# Patient Record
Sex: FEMALE | Race: WHITE | NOT HISPANIC OR LATINO | Employment: UNEMPLOYED
[De-identification: names, ages, dates, MRNs, and addresses within clinical notes are randomized per-mention and may not be internally consistent; named-entity substitution may affect disease eponyms.]

---

## 2017-02-28 ENCOUNTER — RECORDS - HEALTHEAST (OUTPATIENT)
Dept: ADMINISTRATIVE | Facility: OTHER | Age: 42
End: 2017-02-28

## 2017-02-28 ENCOUNTER — OFFICE VISIT - HEALTHEAST (OUTPATIENT)
Dept: INTERNAL MEDICINE | Facility: CLINIC | Age: 42
End: 2017-02-28

## 2017-02-28 DIAGNOSIS — R10.84 GENERALIZED ABDOMINAL PAIN: ICD-10-CM

## 2017-02-28 DIAGNOSIS — J44.1 COPD EXACERBATION (H): ICD-10-CM

## 2017-02-28 ASSESSMENT — MIFFLIN-ST. JEOR: SCORE: 1682.81

## 2017-03-21 ENCOUNTER — COMMUNICATION - HEALTHEAST (OUTPATIENT)
Dept: INTERNAL MEDICINE | Facility: CLINIC | Age: 42
End: 2017-03-21

## 2017-03-24 ENCOUNTER — RECORDS - HEALTHEAST (OUTPATIENT)
Dept: ADMINISTRATIVE | Facility: OTHER | Age: 42
End: 2017-03-24

## 2017-03-27 ENCOUNTER — RECORDS - HEALTHEAST (OUTPATIENT)
Dept: ADMINISTRATIVE | Facility: OTHER | Age: 42
End: 2017-03-27

## 2017-03-30 ENCOUNTER — HOSPITAL ENCOUNTER (OUTPATIENT)
Dept: MRI IMAGING | Facility: CLINIC | Age: 42
Discharge: HOME | End: 2017-03-30
Attending: INTERNAL MEDICINE

## 2017-03-30 DIAGNOSIS — R10.31 ABDOMINAL PAIN, RIGHT LOWER QUADRANT: ICD-10-CM

## 2017-03-31 ENCOUNTER — RECORDS - HEALTHEAST (OUTPATIENT)
Dept: ADMINISTRATIVE | Facility: OTHER | Age: 42
End: 2017-03-31

## 2017-05-03 ENCOUNTER — OFFICE VISIT - HEALTHEAST (OUTPATIENT)
Dept: INTERNAL MEDICINE | Facility: CLINIC | Age: 42
End: 2017-05-03

## 2017-05-03 DIAGNOSIS — J41.1 MUCOPURULENT CHRONIC BRONCHITIS (H): ICD-10-CM

## 2017-05-03 DIAGNOSIS — J01.00 ACUTE NON-RECURRENT MAXILLARY SINUSITIS: ICD-10-CM

## 2017-05-03 ASSESSMENT — MIFFLIN-ST. JEOR: SCORE: 1744.05

## 2017-05-23 ENCOUNTER — OFFICE VISIT - HEALTHEAST (OUTPATIENT)
Dept: INTERNAL MEDICINE | Facility: CLINIC | Age: 42
End: 2017-05-23

## 2017-05-23 DIAGNOSIS — L55.9 SUNBURN: ICD-10-CM

## 2017-05-23 DIAGNOSIS — Q82.8 DARIER DISEASE: ICD-10-CM

## 2017-05-23 ASSESSMENT — MIFFLIN-ST. JEOR: SCORE: 1748.59

## 2017-06-07 ENCOUNTER — OFFICE VISIT - HEALTHEAST (OUTPATIENT)
Dept: INTERNAL MEDICINE | Facility: CLINIC | Age: 42
End: 2017-06-07

## 2017-06-07 DIAGNOSIS — E66.01 MORBID OBESITY DUE TO EXCESS CALORIES (H): ICD-10-CM

## 2017-06-07 DIAGNOSIS — J45.30 MILD PERSISTENT ASTHMA: ICD-10-CM

## 2017-06-07 DIAGNOSIS — Q82.8 DARIER DISEASE: ICD-10-CM

## 2017-06-07 DIAGNOSIS — J30.2 SEASONAL ALLERGIES: ICD-10-CM

## 2017-06-07 ASSESSMENT — MIFFLIN-ST. JEOR: SCORE: 1753.12

## 2017-08-01 ENCOUNTER — OFFICE VISIT - HEALTHEAST (OUTPATIENT)
Dept: INTERNAL MEDICINE | Facility: CLINIC | Age: 42
End: 2017-08-01

## 2017-08-01 ENCOUNTER — HOSPITAL ENCOUNTER (OUTPATIENT)
Dept: CT IMAGING | Facility: CLINIC | Age: 42
Discharge: HOME | End: 2017-08-01
Attending: INTERNAL MEDICINE

## 2017-08-01 DIAGNOSIS — R04.2 HEMOPTYSIS: ICD-10-CM

## 2017-08-01 DIAGNOSIS — R05.9 COUGH: ICD-10-CM

## 2017-08-01 ASSESSMENT — MIFFLIN-ST. JEOR: SCORE: 1730.44

## 2017-08-22 ENCOUNTER — OFFICE VISIT - HEALTHEAST (OUTPATIENT)
Dept: INTERNAL MEDICINE | Facility: CLINIC | Age: 42
End: 2017-08-22

## 2017-08-22 DIAGNOSIS — E66.01 MORBID OBESITY DUE TO EXCESS CALORIES (H): ICD-10-CM

## 2017-08-22 DIAGNOSIS — R10.84 GENERALIZED ABDOMINAL PAIN: ICD-10-CM

## 2017-08-22 DIAGNOSIS — I10 ESSENTIAL HYPERTENSION WITH GOAL BLOOD PRESSURE LESS THAN 140/90: ICD-10-CM

## 2017-08-22 DIAGNOSIS — R05.9 COUGH: ICD-10-CM

## 2017-08-22 ASSESSMENT — MIFFLIN-ST. JEOR: SCORE: 1712.3

## 2017-08-23 ENCOUNTER — RECORDS - HEALTHEAST (OUTPATIENT)
Dept: ADMINISTRATIVE | Facility: OTHER | Age: 42
End: 2017-08-23

## 2017-09-01 ENCOUNTER — AMBULATORY - HEALTHEAST (OUTPATIENT)
Dept: PULMONOLOGY | Facility: OTHER | Age: 42
End: 2017-09-01

## 2017-09-01 DIAGNOSIS — R05.9 COUGH: ICD-10-CM

## 2017-09-07 ENCOUNTER — OFFICE VISIT - HEALTHEAST (OUTPATIENT)
Dept: INTERNAL MEDICINE | Facility: CLINIC | Age: 42
End: 2017-09-07

## 2017-09-07 DIAGNOSIS — J45.30 MILD PERSISTENT ASTHMA WITHOUT COMPLICATION: ICD-10-CM

## 2017-09-07 DIAGNOSIS — Z01.818 PREOPERATIVE EXAMINATION: ICD-10-CM

## 2017-09-07 DIAGNOSIS — F31.9 BIPOLAR 1 DISORDER (H): ICD-10-CM

## 2017-09-07 DIAGNOSIS — I10 ESSENTIAL HYPERTENSION WITH GOAL BLOOD PRESSURE LESS THAN 140/90: ICD-10-CM

## 2017-09-07 DIAGNOSIS — R10.13 EPIGASTRIC PAIN: ICD-10-CM

## 2017-09-07 DIAGNOSIS — L03.818 CELLULITIS OF OTHER SPECIFIED SITE: ICD-10-CM

## 2017-09-07 ASSESSMENT — MIFFLIN-ST. JEOR: SCORE: 1673.74

## 2017-09-13 ENCOUNTER — COMMUNICATION - HEALTHEAST (OUTPATIENT)
Dept: INTERNAL MEDICINE | Facility: CLINIC | Age: 42
End: 2017-09-13

## 2017-09-21 ENCOUNTER — OFFICE VISIT - HEALTHEAST (OUTPATIENT)
Dept: INTERNAL MEDICINE | Facility: CLINIC | Age: 42
End: 2017-09-21

## 2017-09-21 DIAGNOSIS — B96.81 HELICOBACTER PYLORI GASTRITIS: ICD-10-CM

## 2017-09-21 DIAGNOSIS — K29.70 HELICOBACTER PYLORI GASTRITIS: ICD-10-CM

## 2017-09-21 DIAGNOSIS — R05.9 COUGH: ICD-10-CM

## 2017-09-21 ASSESSMENT — MIFFLIN-ST. JEOR: SCORE: 1664.67

## 2017-10-03 ENCOUNTER — OFFICE VISIT - HEALTHEAST (OUTPATIENT)
Dept: PULMONOLOGY | Facility: OTHER | Age: 42
End: 2017-10-03

## 2017-10-03 ENCOUNTER — RECORDS - HEALTHEAST (OUTPATIENT)
Dept: PULMONOLOGY | Facility: OTHER | Age: 42
End: 2017-10-03

## 2017-10-03 ENCOUNTER — RECORDS - HEALTHEAST (OUTPATIENT)
Dept: ADMINISTRATIVE | Facility: OTHER | Age: 42
End: 2017-10-03

## 2017-10-03 DIAGNOSIS — R05.9 COUGH: ICD-10-CM

## 2017-10-03 DIAGNOSIS — J32.9 SINUSITIS: ICD-10-CM

## 2017-10-03 ASSESSMENT — MIFFLIN-ST. JEOR: SCORE: 1666.94

## 2017-10-04 ENCOUNTER — COMMUNICATION - HEALTHEAST (OUTPATIENT)
Dept: INTERNAL MEDICINE | Facility: CLINIC | Age: 42
End: 2017-10-04

## 2017-10-13 ENCOUNTER — RECORDS - HEALTHEAST (OUTPATIENT)
Dept: ADMINISTRATIVE | Facility: OTHER | Age: 42
End: 2017-10-13

## 2017-11-10 ENCOUNTER — OFFICE VISIT - HEALTHEAST (OUTPATIENT)
Dept: INTERNAL MEDICINE | Facility: CLINIC | Age: 42
End: 2017-11-10

## 2017-11-10 ENCOUNTER — RECORDS - HEALTHEAST (OUTPATIENT)
Dept: GENERAL RADIOLOGY | Facility: CLINIC | Age: 42
End: 2017-11-10

## 2017-11-10 DIAGNOSIS — M79.651 RIGHT THIGH PAIN: ICD-10-CM

## 2017-11-10 DIAGNOSIS — H69.93 EUSTACHIAN TUBE DYSFUNCTION, BILATERAL: ICD-10-CM

## 2017-11-10 DIAGNOSIS — M79.651 PAIN IN RIGHT THIGH: ICD-10-CM

## 2017-11-10 DIAGNOSIS — I10 ESSENTIAL HYPERTENSION: ICD-10-CM

## 2017-11-10 DIAGNOSIS — E66.01 MORBID OBESITY (H): ICD-10-CM

## 2017-11-10 ASSESSMENT — MIFFLIN-ST. JEOR: SCORE: 1655.6

## 2017-12-08 ENCOUNTER — COMMUNICATION - HEALTHEAST (OUTPATIENT)
Dept: INTERNAL MEDICINE | Facility: CLINIC | Age: 42
End: 2017-12-08

## 2017-12-08 ENCOUNTER — OFFICE VISIT - HEALTHEAST (OUTPATIENT)
Dept: INTERNAL MEDICINE | Facility: CLINIC | Age: 42
End: 2017-12-08

## 2017-12-08 DIAGNOSIS — I10 ESSENTIAL HYPERTENSION: ICD-10-CM

## 2017-12-08 DIAGNOSIS — R05.9 COUGH: ICD-10-CM

## 2017-12-08 DIAGNOSIS — E66.01 MORBID OBESITY (H): ICD-10-CM

## 2017-12-08 DIAGNOSIS — A04.8 H. PYLORI INFECTION: ICD-10-CM

## 2017-12-08 ASSESSMENT — MIFFLIN-ST. JEOR: SCORE: 1669.2

## 2017-12-29 ENCOUNTER — COMMUNICATION - HEALTHEAST (OUTPATIENT)
Dept: INTERNAL MEDICINE | Facility: CLINIC | Age: 42
End: 2017-12-29

## 2020-05-07 ENCOUNTER — APPOINTMENT (OUTPATIENT)
Dept: CT IMAGING | Facility: CLINIC | Age: 45
End: 2020-05-07
Attending: PHYSICIAN ASSISTANT

## 2020-05-07 ENCOUNTER — HOSPITAL ENCOUNTER (EMERGENCY)
Facility: CLINIC | Age: 45
Discharge: HOME | End: 2020-05-07
Attending: PHYSICIAN ASSISTANT | Admitting: PHYSICIAN ASSISTANT

## 2020-05-07 VITALS
WEIGHT: 220 LBS | DIASTOLIC BLOOD PRESSURE: 57 MMHG | RESPIRATION RATE: 18 BRPM | HEART RATE: 52 BPM | OXYGEN SATURATION: 100 % | TEMPERATURE: 98 F | SYSTOLIC BLOOD PRESSURE: 139 MMHG

## 2020-05-07 DIAGNOSIS — R19.7 DIARRHEA: ICD-10-CM

## 2020-05-07 DIAGNOSIS — R10.31 RLQ ABDOMINAL PAIN: ICD-10-CM

## 2020-05-07 DIAGNOSIS — R11.2 NAUSEA AND VOMITING: ICD-10-CM

## 2020-05-07 LAB
ALBUMIN SERPL-MCNC: 4.1 G/DL (ref 3.4–5)
ALBUMIN UR-MCNC: NEGATIVE MG/DL
ALP SERPL-CCNC: 86 U/L (ref 40–150)
ALT SERPL W P-5'-P-CCNC: 52 U/L (ref 0–50)
ANION GAP SERPL CALCULATED.3IONS-SCNC: 7 MMOL/L (ref 3–14)
APPEARANCE UR: CLEAR
AST SERPL W P-5'-P-CCNC: 28 U/L (ref 0–45)
BACTERIA #/AREA URNS HPF: ABNORMAL /HPF
BASOPHILS # BLD AUTO: 0 10E9/L (ref 0–0.2)
BASOPHILS NFR BLD AUTO: 0.3 %
BILIRUB SERPL-MCNC: 0.6 MG/DL (ref 0.2–1.3)
BILIRUB UR QL STRIP: NEGATIVE
BUN SERPL-MCNC: 8 MG/DL (ref 7–30)
CALCIUM SERPL-MCNC: 9.1 MG/DL (ref 8.5–10.1)
CHLORIDE SERPL-SCNC: 105 MMOL/L (ref 94–109)
CO2 SERPL-SCNC: 26 MMOL/L (ref 20–32)
COLOR UR AUTO: ABNORMAL
CREAT SERPL-MCNC: 0.67 MG/DL (ref 0.52–1.04)
DIFFERENTIAL METHOD BLD: NORMAL
EOSINOPHIL # BLD AUTO: 0 10E9/L (ref 0–0.7)
EOSINOPHIL NFR BLD AUTO: 0.7 %
ERYTHROCYTE [DISTWIDTH] IN BLOOD BY AUTOMATED COUNT: 13.2 % (ref 10–15)
GFR SERPL CREATININE-BSD FRML MDRD: >90 ML/MIN/{1.73_M2}
GLUCOSE SERPL-MCNC: 97 MG/DL (ref 70–99)
GLUCOSE UR STRIP-MCNC: NEGATIVE MG/DL
HCT VFR BLD AUTO: 42.6 % (ref 35–47)
HGB BLD-MCNC: 13.5 G/DL (ref 11.7–15.7)
HGB UR QL STRIP: NEGATIVE
IMM GRANULOCYTES # BLD: 0 10E9/L (ref 0–0.4)
IMM GRANULOCYTES NFR BLD: 0.3 %
KETONES UR STRIP-MCNC: NEGATIVE MG/DL
LEUKOCYTE ESTERASE UR QL STRIP: NEGATIVE
LIPASE SERPL-CCNC: 55 U/L (ref 73–393)
LYMPHOCYTES # BLD AUTO: 2.4 10E9/L (ref 0.8–5.3)
LYMPHOCYTES NFR BLD AUTO: 41.7 %
MCH RBC QN AUTO: 30.1 PG (ref 26.5–33)
MCHC RBC AUTO-ENTMCNC: 31.7 G/DL (ref 31.5–36.5)
MCV RBC AUTO: 95 FL (ref 78–100)
MONOCYTES # BLD AUTO: 0.4 10E9/L (ref 0–1.3)
MONOCYTES NFR BLD AUTO: 6.6 %
MUCOUS THREADS #/AREA URNS LPF: PRESENT /LPF
NEUTROPHILS # BLD AUTO: 2.9 10E9/L (ref 1.6–8.3)
NEUTROPHILS NFR BLD AUTO: 50.4 %
NITRATE UR QL: NEGATIVE
NRBC # BLD AUTO: 0 10*3/UL
NRBC BLD AUTO-RTO: 0 /100
PH UR STRIP: 6 PH (ref 5–7)
PLATELET # BLD AUTO: 344 10E9/L (ref 150–450)
POTASSIUM SERPL-SCNC: 3.8 MMOL/L (ref 3.4–5.3)
PROT SERPL-MCNC: 8.1 G/DL (ref 6.8–8.8)
RBC # BLD AUTO: 4.48 10E12/L (ref 3.8–5.2)
RBC #/AREA URNS AUTO: 1 /HPF (ref 0–2)
SODIUM SERPL-SCNC: 138 MMOL/L (ref 133–144)
SOURCE: ABNORMAL
SP GR UR STRIP: 1.02 (ref 1–1.03)
SQUAMOUS #/AREA URNS AUTO: 1 /HPF (ref 0–1)
UROBILINOGEN UR STRIP-MCNC: NORMAL MG/DL (ref 0–2)
WBC # BLD AUTO: 5.8 10E9/L (ref 4–11)
WBC #/AREA URNS AUTO: <1 /HPF (ref 0–5)

## 2020-05-07 PROCEDURE — 25800030 ZZH RX IP 258 OP 636: Performed by: PHYSICIAN ASSISTANT

## 2020-05-07 PROCEDURE — 74177 CT ABD & PELVIS W/CONTRAST: CPT

## 2020-05-07 PROCEDURE — 25000125 ZZHC RX 250: Performed by: PHYSICIAN ASSISTANT

## 2020-05-07 PROCEDURE — 96361 HYDRATE IV INFUSION ADD-ON: CPT

## 2020-05-07 PROCEDURE — 25000128 H RX IP 250 OP 636: Performed by: PHYSICIAN ASSISTANT

## 2020-05-07 PROCEDURE — 80053 COMPREHEN METABOLIC PANEL: CPT | Performed by: PHYSICIAN ASSISTANT

## 2020-05-07 PROCEDURE — 96375 TX/PRO/DX INJ NEW DRUG ADDON: CPT

## 2020-05-07 PROCEDURE — 85025 COMPLETE CBC W/AUTO DIFF WBC: CPT | Performed by: PHYSICIAN ASSISTANT

## 2020-05-07 PROCEDURE — 99285 EMERGENCY DEPT VISIT HI MDM: CPT | Mod: 25

## 2020-05-07 PROCEDURE — 83690 ASSAY OF LIPASE: CPT | Performed by: PHYSICIAN ASSISTANT

## 2020-05-07 PROCEDURE — 81001 URINALYSIS AUTO W/SCOPE: CPT | Performed by: PHYSICIAN ASSISTANT

## 2020-05-07 PROCEDURE — 96374 THER/PROPH/DIAG INJ IV PUSH: CPT | Mod: 59

## 2020-05-07 PROCEDURE — 96376 TX/PRO/DX INJ SAME DRUG ADON: CPT

## 2020-05-07 RX ORDER — ONDANSETRON 4 MG/1
4 TABLET, ORALLY DISINTEGRATING ORAL EVERY 8 HOURS PRN
Qty: 9 TABLET | Refills: 0 | Status: SHIPPED | OUTPATIENT
Start: 2020-05-07 | End: 2020-05-10

## 2020-05-07 RX ORDER — DICYCLOMINE HCL 20 MG
20 TABLET ORAL 2 TIMES DAILY
Qty: 20 TABLET | Refills: 0 | Status: SHIPPED | OUTPATIENT
Start: 2020-05-07 | End: 2020-05-17

## 2020-05-07 RX ORDER — HYDROMORPHONE HYDROCHLORIDE 1 MG/ML
0.5 INJECTION, SOLUTION INTRAMUSCULAR; INTRAVENOUS; SUBCUTANEOUS
Status: DISCONTINUED | OUTPATIENT
Start: 2020-05-07 | End: 2020-05-07 | Stop reason: HOSPADM

## 2020-05-07 RX ORDER — OXYCODONE HYDROCHLORIDE 5 MG/1
5 TABLET ORAL EVERY 6 HOURS PRN
Qty: 12 TABLET | Refills: 0 | Status: SHIPPED | OUTPATIENT
Start: 2020-05-07 | End: 2021-01-05

## 2020-05-07 RX ORDER — ONDANSETRON 2 MG/ML
4 INJECTION INTRAMUSCULAR; INTRAVENOUS EVERY 30 MIN PRN
Status: DISCONTINUED | OUTPATIENT
Start: 2020-05-07 | End: 2020-05-07 | Stop reason: HOSPADM

## 2020-05-07 RX ORDER — SODIUM CHLORIDE 9 MG/ML
1000 INJECTION, SOLUTION INTRAVENOUS CONTINUOUS
Status: DISCONTINUED | OUTPATIENT
Start: 2020-05-07 | End: 2020-05-07 | Stop reason: HOSPADM

## 2020-05-07 RX ORDER — IOPAMIDOL 755 MG/ML
500 INJECTION, SOLUTION INTRAVASCULAR ONCE
Status: COMPLETED | OUTPATIENT
Start: 2020-05-07 | End: 2020-05-07

## 2020-05-07 RX ADMIN — IOPAMIDOL 100 ML: 755 INJECTION, SOLUTION INTRAVENOUS at 20:21

## 2020-05-07 RX ADMIN — HYDROMORPHONE HYDROCHLORIDE 0.5 MG: 1 INJECTION, SOLUTION INTRAMUSCULAR; INTRAVENOUS; SUBCUTANEOUS at 19:35

## 2020-05-07 RX ADMIN — ONDANSETRON 4 MG: 2 INJECTION INTRAMUSCULAR; INTRAVENOUS at 19:35

## 2020-05-07 RX ADMIN — HYDROMORPHONE HYDROCHLORIDE 0.5 MG: 1 INJECTION, SOLUTION INTRAMUSCULAR; INTRAVENOUS; SUBCUTANEOUS at 20:57

## 2020-05-07 RX ADMIN — SODIUM CHLORIDE 65 ML: 9 INJECTION, SOLUTION INTRAVENOUS at 20:21

## 2020-05-07 RX ADMIN — SODIUM CHLORIDE 1000 ML: 9 INJECTION, SOLUTION INTRAVENOUS at 19:35

## 2020-05-07 ASSESSMENT — ENCOUNTER SYMPTOMS
VOMITING: 1
FEVER: 0
DIARRHEA: 1
FLANK PAIN: 0
ABDOMINAL PAIN: 1
NAUSEA: 1
DYSURIA: 0

## 2020-05-07 NOTE — ED AVS SNAPSHOT
[facility] Emergency Department  [address]  [facility] [address]  Phone:  [phone]  Fax:  [phone]                                    Adriane Garrett   MRN: [id number]    Department:  [facility] Emergency Department   Date of Visit:  5/7/2020           After Visit Summary Signature Page    I have received my discharge instructions, and my questions have been answered. I have discussed any challenges I see with this plan with the nurse or doctor.    ..........................................................................................................................................  Patient/Patient Representative Signature      ..........................................................................................................................................  Patient Representative Print Name and Relationship to Patient    ..................................................               ................................................  Date                                   Time    ..........................................................................................................................................  Reviewed by Signature/Title    ...................................................              ..............................................  Date                                               Time          22EPIC Rev 08/18

## 2020-05-08 NOTE — ED PROVIDER NOTES
History     Chief Complaint:  Abdominal Pain; Nausea & Vomiting; and Diarrhea       HPI   Adriane Garrett is a 44 year old female who presents with diarrhea for the last week.  She states that she has multiple episodes of loose stools a day, every time she sits to urinate she has loose stool.  She denies any dysuria, hematuria.  No vaginal complaints.  For the past 4 days she has began to develop right lower quadrant pain.  She has had a history of hysterectomy and tubal ligation.  No one else around her has been sick.  She denies any cough, chest pain, shortness of breath, fever.  No abnormal food consumption or travel.  She denies a history of C. difficile.  No recent antibiotics.  She does have history of IBS but does not feel that her symptoms are currently consistent with this.  No history of inflammatory bowel disease.  No bloody stools.  She has had a few episodes of vomiting.    Allergies:  No known drug allergies    Medications:    The patient is not currently taking any prescribed medications.    Past Medical History:    IBS    Past Surgical History:   Cholecystectomy   Hysterectomy   Tubal ligation       Family History:    History reviewed. No pertinent family history.     Social History:  Smoking status: never smoker but uses smokeless tobacco  Alcohol use: yes current     Review of Systems   Constitutional: Negative for fever.   Gastrointestinal: Positive for abdominal pain, diarrhea, nausea and vomiting.   Genitourinary: Negative for dysuria, flank pain and urgency.   All other systems reviewed and are negative.    Physical Exam     Patient Vitals for the past 24 hrs:   BP Temp Temp src Pulse Heart Rate Resp SpO2 Weight   05/07/20 2100 139/57 -- -- -- -- -- 100 % --   05/07/20 2000 114/47 -- -- (!) 45 -- -- -- --   05/07/20 1955 -- -- -- -- -- -- -- 99.8 kg (220 lb)   05/07/20 1915 (!) 135/91 -- -- -- -- -- -- --   05/07/20 1911 -- 98  F (36.7  C) Oral 52 52 18 99 % --        Physical Exam  General:  Well appearing, well nourished. Normal mood and affect.  Skin: Good turgor, no rash, no unusual bruising or prominent lesions.  HEENT: Head: Normocephalic, atraumatic, no visible masses.   Eyes: Conjunctiva clear.  Cardiac: Normal rate and regular rhythm, no murmur or gallop.   Lungs: Clear to auscultation.  Abdomen: Right lower quadrant pain.  Mild guarding.  No pain at McBurney's.  No flank tenderness bilaterally.  Musculoskeletal: Normal gait and station.   Neurologic: Oriented x 3. GCS: 15.  Psychiatric: Intact recent and remote memory, judgment and insight, normal mood and affect.     Emergency Department Course     Imaging:  Radiographic findings were communicated with the patient who voiced understanding of the findings.    CT Abdomen Pelvis w contrast   IMPRESSION:   1.  No acute abnormality in the abdomen or pelvis  Reading per radiology     Laboratory:  CBC:  o/w WNL. (WBC 5.8, HGB 13.5, )   CMP: Glucose 97, o/w WNL (Creatinine: 0.67)    Lipase: 55 (L)    UA: bacteria few, mucus present, o/w Negative    Interventions:  1935 NS 1L IV Bolus  1935 Zofran 4mg IV injection   1935 Dilaudid 0.5mg IV injection  2057 Dilaudid 0.5mg IV injection     Emergency Department Course:  Past medical records, nursing notes, and vitals reviewed.  I performed an exam of the patient and obtained history, as documented above.    IV was inserted and blood was drawn for laboratory testing, results above.  The patient was sent for a CT while in the emergency department, findings above.  The patient provided a urine sample here in the emergency department. This was sent for laboratory testing, findings above.    2121 I rechecked the patient. Findings and plan explained to the Patient. Patient was feeling improved.    I personally reviewed the laboratory results with the Patient and answered all related questions prior to discharge.    I rechecked the patient.  Findings and plan explained to the Patient. Patient discharged  home with instructions regarding supportive care, medications, and reasons to return. The importance of close follow-up was reviewed.     Impression & Plan      Medical Decision Making:  Adriane Garrett is a 44 year old female who presents for abdominal pain.  Details of the patient's history can be noted in the HPI.  Differentials considered include appendicitis, diverticulitis, gallbladder pathology, gastritis, obstruction, C. difficile infection, amongst others.  On my exam, patient had right lower quadrant pain.  Basic labs obtained, showing no electrolyte disturbances.  No leukocytosis.  Urine negative for infection.  She has had a hysterectomy and tubal ligation, pregnancy not needed.  CT obtained, showing no signs of pathology within the abdomen.  After IV fluids and pain medications as given above as well as antiemetics, patient had significant improvement in her symptoms.  With reassuring work-up here, I do not feel that she warrants additional testing at this time.  We will discharge her with Bentyl to help with abdominal cramping, Zofran for vomiting, and oxycodone.  She also given a stool kit and if she is able to provide a sample she will return this as she has been having loose stools for numerous days.  Close follow-up with primary if not improving.  Return for changing worsening abdominal pain, increasing weakness, high fevers, new concerns.  All questions were answered prior to discharge.  She is in agreement with the treatment plan as stated above.      Diagnosis:    ICD-10-CM    1. RLQ abdominal pain  R10.31    2. Diarrhea  R19.7 Enteric Bacteria and Virus Panel by SUSAN Stool     Clostridium difficile toxin B   3. Nausea and vomiting  R11.2         Discharge Medications:  New Prescriptions    DICYCLOMINE (BENTYL) 20 MG TABLET    Take 1 tablet (20 mg) by mouth 2 times daily for 10 days    ONDANSETRON (ZOFRAN ODT) 4 MG ODT TAB    Take 1 tablet (4 mg) by mouth every 8 hours as needed for nausea or  vomiting    OXYCODONE (ROXICODONE) 5 MG TABLET    Take 1 tablet (5 mg) by mouth every 6 hours as needed for pain        5/7/2020   Emili Horowitz PA     Scribe Disclosure:  I, Rhina Purdy, am serving as a scribe at 7:46 PM on 5/7/2020 to document services personally performed by Emili Horowitz PA based on my observations and the provider's statements to me.     This was created at least in part with a voice recognition software. Mistakes/typos may be present.      Emili Horowitz PA  05/07/20 9890

## 2020-05-08 NOTE — ED TRIAGE NOTES
Pt started having diarrhea x1 week. On Sunday night, pt started having RLQ abd pain. Pain has been getting worse. Some nausea and vomiting, but mainly pain and diarrhea. Had 10x diarrhea today. Unknown fever status. ABCs intact. A&OX3.

## 2020-05-08 NOTE — DISCHARGE INSTRUCTIONS
Your blood work and scan look reassuring here today.  Suspect that a viral illness is causing your abdominal cramping and loose stools.  If you are able to provide a stool test, you can bring back the sample as instructed at discharge.  The Zofran at home can help with nausea.  The Bentyl will help with abdominal cramping.  The oxycodone should help with pain.  You can take Tylenol and ibuprofen with oxycodone as well.  Try and stay well-hydrated at home.  Eat bland foods, avoid spicy foods.  Return for changing worsening symptoms, worsening abdominal pain, persistent bloody stools, new concerns.  Call primary if not improving in the next few days.     Discharge Instructions  Gastroenteritis    You have been seen today for vomiting and diarrhea, called gastroenteritis or the stomach flu. This is usually caused by a virus, but some bacteria, parasites, medicines or other medical conditions can cause similar symptoms. At this time your doctor does not find that your vomiting and diarrhea is a sign of anything dangerous or life-threatening.  However, sometimes the signs of serious illness do not show up right away.  If you have new or worse symptoms, you may need to be seen again in the Emergency Department or by your primary doctor. Remember that serious problems like appendicitis can look like gastroenteritis at first.       Return to the Emergency Department if:  You keep throwing up and you are not able to keep liquids down.  You feel you are getting dehydrated, such as being very thirsty, not urinating at least every 8-12 hours, or feeling faint or lightheaded.   You develop a new fever, or your fever continues for more than 2 days.  You have belly pain that seems worse than cramps, is in one spot, or is getting worse over time.   You have blood in your vomit or in your diarrhea.  You feel very weak.  You are not starting to improve within 24 hours of your visit here.    What can I do to help myself?  The most  important thing to do is to drink clear liquids.  If you have been vomiting a lot, it is best to have only small, frequent sips of liquids.  Drinking too much at once may cause more vomiting. If you are vomiting often, you must replace minerals, sodium and potassium lost with your illness. Pedialyte  and sports drinks can help you replace these minerals.  You can also drink clear liquids such as water, weak tea, apple juice, and 7-Up . Avoid acid liquids (orange), caffeine (coffee) or alcohol. Do not drink milk until you no longer have diarrhea.  After liquids are staying down, you may start eating mild foods. Soda crackers, toast, plain noodles, gelatin, applesauce and bananas are good first choices.  Avoid foods that have acid, are spicy, fatty or fibrous (such as meats, coarse grains, vegetables). You may start eating these foods again in about 3 days when you are better.  Sometimes treatment includes prescription medicine to prevent nausea and vomiting and to prevent diarrhea. If your doctor prescribes these for you, take them as directed.  Nonprescription medicine is available for the treatment of diarrhea and can be very effective.  If you use it, make sure you use the dose recommended on the package. Avoid Lomotil . Check with your healthcare provider before you use any medicine for diarrhea.  Don t take ibuprofen, or other nonsteroidal anti-inflammatory medicines without checking with your healthcare provider.  If you were given a prescription for medicine here today, be sure to read all of the information (including the package insert) that comes with your prescription.  This will include important information about the medicine, its side effects, and any warnings that you need to know about.  The pharmacist who fills the prescription can provide more information and answer questions you may have about the medicine.  If you have questions or concerns that the pharmacist cannot address, please call or return  to the Emergency Department.   Opioid Medication Information    Pain medications are among the most commonly prescribed medicines, so we are including this information for all our patients. If you did not receive pain medication or get a prescription for pain medicine, you can ignore it.     You may have been given a prescription for an opioid (narcotic) pain medicine and/or have received a pain medicine while here in the Emergency Department. These medicines can make you drowsy or impaired. You must not drive, operate dangerous equipment, or engage in any other dangerous activities while taking these medications. If you drive while taking these medications, you could be arrested for DUI, or driving under the influence. Do not drink any alcohol while you are taking these medications.     Opioid pain medications can cause addiction. If you have a history of chemical dependency of any type, you are at a higher risk of becoming addicted to pain medications.  Only take these prescribed medications to treat your pain when all other options have been tried. Take it for as short a time and as few doses as possible. Store your pain pills in a secure place, as they are frequently stolen and provide a dangerous opportunity for children or visitors in your house to start abusing these powerful medications. We will not replace any lost or stolen medicine.  As soon as your pain is better, you should flush all your remaining medication.     Many prescription pain medications contain Tylenol  (acetaminophen), including Vicodin , Tylenol #3 , Norco , Lortab , and Percocet .  You should not take any extra pills of Tylenol  if you are using these prescription medications or you can get very sick.  Do not ever take more than 3000 mg of acetaminophen in any 24 hour period.    All opioids tend to cause constipation. Drink plenty of water and eat foods that have a lot of fiber, such as fruits, vegetables, prune juice, apple juice and  high fiber cereal.  Take a laxative if you don t move your bowels at least every other day. Miralax , Milk of Magnesia, Colace , or Senna  can be used to keep you regular.      Remember that you can always come back to the Emergency Department if you are not able to see your regular doctor in the amount of time listed above, if you get any new symptoms, or if there is anything that worries you.

## 2020-07-14 ENCOUNTER — HOSPITAL ENCOUNTER (EMERGENCY)
Facility: CLINIC | Age: 45
Discharge: HOME | End: 2020-07-15
Attending: EMERGENCY MEDICINE | Admitting: EMERGENCY MEDICINE

## 2020-07-14 DIAGNOSIS — R10.11 RUQ ABDOMINAL PAIN: ICD-10-CM

## 2020-07-14 PROCEDURE — 99285 EMERGENCY DEPT VISIT HI MDM: CPT | Mod: 25

## 2020-07-14 PROCEDURE — 80053 COMPREHEN METABOLIC PANEL: CPT | Performed by: EMERGENCY MEDICINE

## 2020-07-14 PROCEDURE — 96374 THER/PROPH/DIAG INJ IV PUSH: CPT

## 2020-07-14 PROCEDURE — 96375 TX/PRO/DX INJ NEW DRUG ADDON: CPT

## 2020-07-14 PROCEDURE — 85025 COMPLETE CBC W/AUTO DIFF WBC: CPT | Performed by: EMERGENCY MEDICINE

## 2020-07-14 PROCEDURE — 83690 ASSAY OF LIPASE: CPT | Performed by: EMERGENCY MEDICINE

## 2020-07-14 RX ORDER — ONDANSETRON 2 MG/ML
4 INJECTION INTRAMUSCULAR; INTRAVENOUS
Status: DISCONTINUED | OUTPATIENT
Start: 2020-07-14 | End: 2020-07-15 | Stop reason: HOSPADM

## 2020-07-14 ASSESSMENT — ENCOUNTER SYMPTOMS
FREQUENCY: 0
ROS GI COMMENTS: NO MELENA
DIARRHEA: 1
NAUSEA: 1
ABDOMINAL PAIN: 1
BLOOD IN STOOL: 0
FEVER: 0
CONSTIPATION: 0
DYSURIA: 0
VOMITING: 1
SHORTNESS OF BREATH: 0
HEMATURIA: 0

## 2020-07-14 NOTE — ED AVS SNAPSHOT
[facility] Emergency Department  [address]  [facility] [address]  Phone:  [phone]  Fax:  [phone]                                    Adriane Garrett   MRN: [id number]    Department:  [facility] Emergency Department   Date of Visit:  7/14/2020           After Visit Summary Signature Page    I have received my discharge instructions, and my questions have been answered. I have discussed any challenges I see with this plan with the nurse or doctor.    ..........................................................................................................................................  Patient/Patient Representative Signature      ..........................................................................................................................................  Patient Representative Print Name and Relationship to Patient    ..................................................               ................................................  Date                                   Time    ..........................................................................................................................................  Reviewed by Signature/Title    ...................................................              ..............................................  Date                                               Time          22EPIC Rev 08/18

## 2020-07-15 VITALS
TEMPERATURE: 98.3 F | RESPIRATION RATE: 20 BRPM | SYSTOLIC BLOOD PRESSURE: 141 MMHG | OXYGEN SATURATION: 96 % | HEART RATE: 51 BPM | DIASTOLIC BLOOD PRESSURE: 69 MMHG

## 2020-07-15 LAB
ALBUMIN SERPL-MCNC: 3.5 G/DL (ref 3.4–5)
ALP SERPL-CCNC: 95 U/L (ref 40–150)
ALT SERPL W P-5'-P-CCNC: 32 U/L (ref 0–50)
ANION GAP SERPL CALCULATED.3IONS-SCNC: 4 MMOL/L (ref 3–14)
AST SERPL W P-5'-P-CCNC: 15 U/L (ref 0–45)
BILIRUB SERPL-MCNC: 0.2 MG/DL (ref 0.2–1.3)
BUN SERPL-MCNC: 14 MG/DL (ref 7–30)
CALCIUM SERPL-MCNC: 8.8 MG/DL (ref 8.5–10.1)
CHLORIDE SERPL-SCNC: 106 MMOL/L (ref 94–109)
CO2 SERPL-SCNC: 29 MMOL/L (ref 20–32)
CREAT SERPL-MCNC: 0.63 MG/DL (ref 0.52–1.04)
DIFFERENTIAL METHOD BLD: NORMAL
EOSINOPHIL # BLD AUTO: 0.1 10E9/L (ref 0–0.7)
EOSINOPHIL NFR BLD AUTO: 2 %
ERYTHROCYTE [DISTWIDTH] IN BLOOD BY AUTOMATED COUNT: 13 % (ref 10–15)
GFR SERPL CREATININE-BSD FRML MDRD: >90 ML/MIN/{1.73_M2}
GLUCOSE SERPL-MCNC: 107 MG/DL (ref 70–99)
HCT VFR BLD AUTO: 39.1 % (ref 35–47)
HGB BLD-MCNC: 12.5 G/DL (ref 11.7–15.7)
LIPASE SERPL-CCNC: 78 U/L (ref 73–393)
LYMPHOCYTES # BLD AUTO: 3 10E9/L (ref 0.8–5.3)
LYMPHOCYTES NFR BLD AUTO: 50 %
MCH RBC QN AUTO: 29.8 PG (ref 26.5–33)
MCHC RBC AUTO-ENTMCNC: 32 G/DL (ref 31.5–36.5)
MCV RBC AUTO: 93 FL (ref 78–100)
MONOCYTES # BLD AUTO: 0.4 10E9/L (ref 0–1.3)
MONOCYTES NFR BLD AUTO: 6 %
NEUTROPHILS # BLD AUTO: 2.5 10E9/L (ref 1.6–8.3)
NEUTROPHILS NFR BLD AUTO: 42 %
PLATELET # BLD AUTO: 335 10E9/L (ref 150–450)
POTASSIUM SERPL-SCNC: 3.6 MMOL/L (ref 3.4–5.3)
PROT SERPL-MCNC: 7.2 G/DL (ref 6.8–8.8)
RBC # BLD AUTO: 4.19 10E12/L (ref 3.8–5.2)
SODIUM SERPL-SCNC: 139 MMOL/L (ref 133–144)
WBC # BLD AUTO: 6 10E9/L (ref 4–11)

## 2020-07-15 PROCEDURE — 25000132 ZZH RX MED GY IP 250 OP 250 PS 637: Performed by: EMERGENCY MEDICINE

## 2020-07-15 PROCEDURE — 25000125 ZZHC RX 250: Performed by: EMERGENCY MEDICINE

## 2020-07-15 PROCEDURE — 25000128 H RX IP 250 OP 636: Performed by: EMERGENCY MEDICINE

## 2020-07-15 RX ORDER — OXYCODONE HYDROCHLORIDE 5 MG/1
5 TABLET ORAL EVERY 6 HOURS PRN
Qty: 5 TABLET | Refills: 0 | Status: SHIPPED | OUTPATIENT
Start: 2020-07-15 | End: 2021-01-05

## 2020-07-15 RX ORDER — DICYCLOMINE HCL 20 MG
20 TABLET ORAL 4 TIMES DAILY PRN
Qty: 20 TABLET | Refills: 0 | Status: SHIPPED | OUTPATIENT
Start: 2020-07-15 | End: 2020-07-25

## 2020-07-15 RX ADMIN — LIDOCAINE HYDROCHLORIDE 30 ML: 20 SOLUTION ORAL; TOPICAL at 01:00

## 2020-07-15 RX ADMIN — HYDROMORPHONE HYDROCHLORIDE 1 MG: 1 INJECTION, SOLUTION INTRAMUSCULAR; INTRAVENOUS; SUBCUTANEOUS at 01:22

## 2020-07-15 RX ADMIN — ONDANSETRON 4 MG: 2 INJECTION INTRAMUSCULAR; INTRAVENOUS at 00:27

## 2020-07-15 NOTE — ED PROVIDER NOTES
History     Chief Complaint:  Abdominal Pain    The history is provided by the patient.      Adriane Garrett is a 44 year old female with history of pancreatitis, IBS, GERD, cholecystectomy and hysterectomy amongst others as noted below, who presents alone for evaluation of sharp right upper abdominal pain radiating into the back that began yesterday afternoon, reminiscent of her previous bouts of pancreatitis. Patient states the the pain normally doesn't escalate this bad, this quickly, prompting her presentation.    Here, patient reports she also has experienced nausea, an episode of vomiting and multiple episode of diarrhea. She states she took Tylenol with no relief. Patient reports pain is exacerbated with eating, but no known alleviating factors. She denies any alcohol use, urinary symptoms, fever, constipation, hematochezia, melena, chest pain or shortness of breath. Of note, patient has seen GI in the past for this with no known cause identified and states she had an endoscopy several years ago.     Allergies:  Adhesives  Ampicillin  Salicylates  Varenicline  Quinolones  Citalopram  Penicillins  Aspirin     Medications:    Lisinopril  Mylanta  Prilosec  Pamelor    Past Medical History:    Irritable bowel syndrome  GERD  Pancreatitis  Asthma  Depression  Hyperlipidemia    Past Surgical History:    Cholecystectomy  Hysterectomy and tubal ligation  Mass removed from bottom of foot    Family History:    Mother - type II diabetes  Brother(s) - CAD, heart disease    Social History:  The patient was unaccompanied to the ED.  Smoking Status: Former  Smokeless Tobacco: E-cig  Alcohol Use: Yes  Drug Use: Not currently   Marital Status:   [2]     Review of Systems   Constitutional: Negative for fever.   Respiratory: Negative for shortness of breath.    Cardiovascular: Negative for chest pain.   Gastrointestinal: Positive for abdominal pain, diarrhea, nausea and vomiting. Negative for blood in stool and  constipation.        No melena   Genitourinary: Negative for dysuria, frequency, hematuria and urgency.   All other systems reviewed and are negative.    Physical Exam     Patient Vitals for the past 24 hrs:   BP Temp Temp src Pulse Heart Rate Resp SpO2   07/15/20 0230 -- -- -- 51 51 -- --   07/15/20 0215 -- -- -- -- -- -- 96 %   07/15/20 0200 -- -- -- -- -- -- 96 %   07/15/20 0145 (!) 141/69 -- -- -- -- -- 97 %   07/15/20 0130 -- -- -- -- -- -- 92 %   07/15/20 0115 (!) 150/74 -- -- -- -- -- 96 %   07/15/20 0100 (!) 147/84 -- -- (!) 48 -- -- 95 %   07/15/20 0040 (!) 146/71 -- -- (!) 49 -- -- 96 %   07/15/20 0030 (!) 141/86 -- -- 52 -- -- 97 %   07/14/20 2226 (!) 170/83 -- -- -- -- 20 --   07/14/20 2224 -- 98.3  F (36.8  C) Oral 57 57 -- 100 %       Physical Exam  General: Alert, no acute distress  HEENT:  Moist mucous membranes.  Conjunctiva normal  CV:  RRR, no m/r/g, skin warm and well perfused  Pulm:  CTAB, no wheezes/ronchi/rales.  No acute distress, breathing comfortably  GI:  Soft, mild RUQ tenderness, nondistended.  No rebound or guarding.  Normal bowel sounds  MSK:  Moving all extremities.  No focal areas of edema, erythema, or tenderness  Skin:  WWP, no rashes, no lower extremity edema, skin color normal, no diaphoresis  Psych:  Well-appearing, normal affect, regular speech    Emergency Department Course       Laboratory:  Labs Ordered and Resulted from Time of ED Arrival Up to the Time of Departure from the ED   COMPREHENSIVE METABOLIC PANEL - Abnormal; Notable for the following components:       Result Value    Glucose 107 (*)     All other components within normal limits   CBC WITH PLATELETS DIFFERENTIAL   LIPASE         Interventions:  Medications   lidocaine (XYLOCAINE) 2 % 15 mL, alum & mag hydroxide-simethicone (MAALOX  ES) 15 mL GI Cocktail (30 mLs Oral Given 7/15/20 0100)   HYDROmorphone (DILAUDID) injection 1 mg (1 mg Intravenous Given 7/15/20 0122)       Emergency Department Course:  Past medical  records, nursing notes, and vitals reviewed.    (6564)   I performed an exam of the patient as documented above. History obtained from patient.    IV was inserted and blood was drawn for laboratory testing, results above.    The patient provided a urine sample here in the emergency department. This was sent for laboratory testing, findings above.      I rechecked the patient and discussed the results of her workup thus far.    Findings and plan explained to the Patient. Patient discharged home with instructions regarding supportive care, medications, and reasons to return. The importance of close follow-up was reviewed. The patient was prescribed oxycodone.    I personally reviewed the laboratory and imaging results with the Patient and answered all related questions prior to discharge.     Impression & Plan       Medical Decision Making:   Adriane Garrett is a 44 year old female who presents to the ER for evaluation of right upper quadrant abdominal pain.  States she has had this happen in the past and is exactly similar to her previous bouts of pancreatitis.  She has had history of cholecystectomy.  She denies fevers and is afebrile hemodynamically stable.  Denies any alcohol use or drug use.  She has mild right upper quadrant tenderness on exam but no peritoneal signs suggest perforated viscus.  Lab studies including CBC, LFT, BMP, lipase are normal.  Symptoms improved with the above interventions.  At this time, very low suspicion for intra-abdominal catastrophe requiring CT imaging as patient has had numerous CT scans in the past for similar presentation and she states this feels exactly similar to those.  She agrees with foregoing CT given risk of radiation as she is otherwise improved in the ER.  I did discuss sometimes vague abdominal symptoms early on in the disease process that can be a harbinger to something more serious and she understands that if there are any worsening symptoms, to present back to the ER  at which point CT imaging should be considered.  However, given her improvement of symptoms and benign repeat exam, risk of radiation greater than any benefit and she is agreeable.  Doubt atypical ACS, pneumonia, PE as she is low risk and PERC negative, appendicitis, diverticulitis, bowel obstruction, etc.  With reasonable clinical certainty, patient is safe to discharge home when she is able to tolerate p.o. challenge here.  We will have her follow-up with GI and she understands and agrees.  Reasons to return to the ER were discussed and all questions were answered.      Diagnosis:    ICD-10-CM    1. RUQ abdominal pain  R10.11        Disposition:  Discharged to home.    Discharge Medications:  Discharge Medication List as of 7/15/2020  2:24 AM      START taking these medications    Details   !! oxyCODONE (ROXICODONE) 5 MG tablet Take 1 tablet (5 mg) by mouth every 6 hours as needed for breakthrough pain or severe pain, Disp-5 tablet,R-0, Local Print       !! - Potential duplicate medications found. Please discuss with provider.          Scribe Disclosure:  I, Romana Victoria, am serving as a scribe at 11:38 PM on 7/14/2020 to document services personally performed by Darrel Fall MD based on my observations and the provider's statements to me.   7/14/2020   [facility] EMERGENCY DEPARTMENT       Darrel Fall MD  07/15/20 0670     limited ambulator following surgery

## 2020-09-13 ENCOUNTER — APPOINTMENT (OUTPATIENT)
Dept: CT IMAGING | Facility: CLINIC | Age: 45
End: 2020-09-13
Attending: PHYSICIAN ASSISTANT

## 2020-09-13 ENCOUNTER — HOSPITAL ENCOUNTER (EMERGENCY)
Facility: CLINIC | Age: 45
Discharge: HOME | End: 2020-09-13
Attending: PHYSICIAN ASSISTANT | Admitting: PHYSICIAN ASSISTANT

## 2020-09-13 VITALS
TEMPERATURE: 98 F | OXYGEN SATURATION: 97 % | WEIGHT: 255.51 LBS | RESPIRATION RATE: 18 BRPM | SYSTOLIC BLOOD PRESSURE: 150 MMHG | HEART RATE: 57 BPM | DIASTOLIC BLOOD PRESSURE: 56 MMHG

## 2020-09-13 DIAGNOSIS — R10.13 ABDOMINAL PAIN, EPIGASTRIC: ICD-10-CM

## 2020-09-13 DIAGNOSIS — R10.11 RUQ ABDOMINAL PAIN: ICD-10-CM

## 2020-09-13 LAB
ALBUMIN SERPL-MCNC: 3.7 G/DL (ref 3.4–5)
ALBUMIN UR-MCNC: NEGATIVE MG/DL
ALP SERPL-CCNC: 92 U/L (ref 40–150)
ALT SERPL W P-5'-P-CCNC: 30 U/L (ref 0–50)
ANION GAP SERPL CALCULATED.3IONS-SCNC: 6 MMOL/L (ref 3–14)
APPEARANCE UR: CLEAR
AST SERPL W P-5'-P-CCNC: 18 U/L (ref 0–45)
BASOPHILS # BLD AUTO: 0 10E9/L (ref 0–0.2)
BASOPHILS NFR BLD AUTO: 0.4 %
BILIRUB SERPL-MCNC: 0.3 MG/DL (ref 0.2–1.3)
BILIRUB UR QL STRIP: NEGATIVE
BUN SERPL-MCNC: 9 MG/DL (ref 7–30)
CALCIUM SERPL-MCNC: 9.4 MG/DL (ref 8.5–10.1)
CHLORIDE SERPL-SCNC: 107 MMOL/L (ref 94–109)
CO2 SERPL-SCNC: 27 MMOL/L (ref 20–32)
COLOR UR AUTO: NORMAL
CREAT SERPL-MCNC: 0.7 MG/DL (ref 0.52–1.04)
DIFFERENTIAL METHOD BLD: ABNORMAL
EOSINOPHIL # BLD AUTO: 0.1 10E9/L (ref 0–0.7)
EOSINOPHIL NFR BLD AUTO: 1.1 %
ERYTHROCYTE [DISTWIDTH] IN BLOOD BY AUTOMATED COUNT: 13.2 % (ref 10–15)
GFR SERPL CREATININE-BSD FRML MDRD: >90 ML/MIN/{1.73_M2}
GLUCOSE SERPL-MCNC: 96 MG/DL (ref 70–99)
GLUCOSE UR STRIP-MCNC: NEGATIVE MG/DL
HCT VFR BLD AUTO: 39.9 % (ref 35–47)
HGB BLD-MCNC: 12.5 G/DL (ref 11.7–15.7)
HGB UR QL STRIP: NEGATIVE
IMM GRANULOCYTES # BLD: 0 10E9/L (ref 0–0.4)
IMM GRANULOCYTES NFR BLD: 0.4 %
KETONES UR STRIP-MCNC: NEGATIVE MG/DL
LEUKOCYTE ESTERASE UR QL STRIP: NEGATIVE
LIPASE SERPL-CCNC: 57 U/L (ref 73–393)
LYMPHOCYTES # BLD AUTO: 2.7 10E9/L (ref 0.8–5.3)
LYMPHOCYTES NFR BLD AUTO: 48.8 %
MCH RBC QN AUTO: 29.3 PG (ref 26.5–33)
MCHC RBC AUTO-ENTMCNC: 31.3 G/DL (ref 31.5–36.5)
MCV RBC AUTO: 93 FL (ref 78–100)
MONOCYTES # BLD AUTO: 0.5 10E9/L (ref 0–1.3)
MONOCYTES NFR BLD AUTO: 9.5 %
NEUTROPHILS # BLD AUTO: 2.2 10E9/L (ref 1.6–8.3)
NEUTROPHILS NFR BLD AUTO: 39.8 %
NITRATE UR QL: NEGATIVE
NRBC # BLD AUTO: 0 10*3/UL
NRBC BLD AUTO-RTO: 0 /100
PH UR STRIP: 5.5 PH (ref 5–7)
PLATELET # BLD AUTO: 339 10E9/L (ref 150–450)
POTASSIUM SERPL-SCNC: 3.8 MMOL/L (ref 3.4–5.3)
PROT SERPL-MCNC: 7.5 G/DL (ref 6.8–8.8)
RBC # BLD AUTO: 4.27 10E12/L (ref 3.8–5.2)
RBC #/AREA URNS AUTO: <1 /HPF (ref 0–2)
SODIUM SERPL-SCNC: 140 MMOL/L (ref 133–144)
SOURCE: NORMAL
SP GR UR STRIP: 1.01 (ref 1–1.03)
SQUAMOUS #/AREA URNS AUTO: <1 /HPF (ref 0–1)
UROBILINOGEN UR STRIP-MCNC: NORMAL MG/DL (ref 0–2)
WBC # BLD AUTO: 5.6 10E9/L (ref 4–11)
WBC #/AREA URNS AUTO: <1 /HPF (ref 0–5)

## 2020-09-13 PROCEDURE — 96361 HYDRATE IV INFUSION ADD-ON: CPT

## 2020-09-13 PROCEDURE — 25000128 H RX IP 250 OP 636: Performed by: PHYSICIAN ASSISTANT

## 2020-09-13 PROCEDURE — 81001 URINALYSIS AUTO W/SCOPE: CPT | Performed by: PHYSICIAN ASSISTANT

## 2020-09-13 PROCEDURE — 99285 EMERGENCY DEPT VISIT HI MDM: CPT | Mod: 25

## 2020-09-13 PROCEDURE — 83690 ASSAY OF LIPASE: CPT | Performed by: PHYSICIAN ASSISTANT

## 2020-09-13 PROCEDURE — 96374 THER/PROPH/DIAG INJ IV PUSH: CPT | Mod: 59

## 2020-09-13 PROCEDURE — 25800030 ZZH RX IP 258 OP 636: Performed by: PHYSICIAN ASSISTANT

## 2020-09-13 PROCEDURE — 85025 COMPLETE CBC W/AUTO DIFF WBC: CPT | Performed by: PHYSICIAN ASSISTANT

## 2020-09-13 PROCEDURE — 80053 COMPREHEN METABOLIC PANEL: CPT | Performed by: PHYSICIAN ASSISTANT

## 2020-09-13 PROCEDURE — 96376 TX/PRO/DX INJ SAME DRUG ADON: CPT

## 2020-09-13 PROCEDURE — 25000125 ZZHC RX 250: Performed by: PHYSICIAN ASSISTANT

## 2020-09-13 PROCEDURE — 96375 TX/PRO/DX INJ NEW DRUG ADDON: CPT

## 2020-09-13 PROCEDURE — 74177 CT ABD & PELVIS W/CONTRAST: CPT

## 2020-09-13 RX ORDER — SUCRALFATE 1 G/1
1 TABLET ORAL 4 TIMES DAILY
Qty: 28 TABLET | Refills: 0 | Status: SHIPPED | OUTPATIENT
Start: 2020-09-13 | End: 2020-09-20

## 2020-09-13 RX ORDER — ONDANSETRON 2 MG/ML
4 INJECTION INTRAMUSCULAR; INTRAVENOUS ONCE
Status: COMPLETED | OUTPATIENT
Start: 2020-09-13 | End: 2020-09-13

## 2020-09-13 RX ORDER — HYDROMORPHONE HYDROCHLORIDE 1 MG/ML
0.5 INJECTION, SOLUTION INTRAMUSCULAR; INTRAVENOUS; SUBCUTANEOUS ONCE
Status: COMPLETED | OUTPATIENT
Start: 2020-09-13 | End: 2020-09-13

## 2020-09-13 RX ORDER — ONDANSETRON 4 MG/1
4 TABLET, ORALLY DISINTEGRATING ORAL EVERY 8 HOURS PRN
Qty: 10 TABLET | Refills: 0 | Status: SHIPPED | OUTPATIENT
Start: 2020-09-13 | End: 2021-03-01

## 2020-09-13 RX ORDER — OXYCODONE HYDROCHLORIDE 5 MG/1
5 TABLET ORAL EVERY 6 HOURS PRN
Qty: 8 TABLET | Refills: 0 | Status: SHIPPED | OUTPATIENT
Start: 2020-09-13 | End: 2021-01-05

## 2020-09-13 RX ORDER — IOPAMIDOL 755 MG/ML
500 INJECTION, SOLUTION INTRAVASCULAR ONCE
Status: COMPLETED | OUTPATIENT
Start: 2020-09-13 | End: 2020-09-13

## 2020-09-13 RX ADMIN — HYDROMORPHONE HYDROCHLORIDE 0.5 MG: 1 INJECTION, SOLUTION INTRAMUSCULAR; INTRAVENOUS; SUBCUTANEOUS at 17:42

## 2020-09-13 RX ADMIN — HYDROMORPHONE HYDROCHLORIDE 0.5 MG: 1 INJECTION, SOLUTION INTRAMUSCULAR; INTRAVENOUS; SUBCUTANEOUS at 16:30

## 2020-09-13 RX ADMIN — IOPAMIDOL 100 ML: 755 INJECTION, SOLUTION INTRAVENOUS at 17:22

## 2020-09-13 RX ADMIN — SODIUM CHLORIDE 1000 ML: 9 INJECTION, SOLUTION INTRAVENOUS at 16:19

## 2020-09-13 RX ADMIN — SODIUM CHLORIDE 65 ML: 9 INJECTION, SOLUTION INTRAVENOUS at 17:24

## 2020-09-13 RX ADMIN — ONDANSETRON 4 MG: 2 INJECTION INTRAMUSCULAR; INTRAVENOUS at 16:30

## 2020-09-13 ASSESSMENT — ENCOUNTER SYMPTOMS
HEMATURIA: 0
ABDOMINAL PAIN: 1
DYSURIA: 0
FEVER: 0
DIFFICULTY URINATING: 0
CHILLS: 0
VOMITING: 0
FREQUENCY: 0
BACK PAIN: 1
NAUSEA: 1
SHORTNESS OF BREATH: 0
DIARRHEA: 0
CONSTIPATION: 0

## 2020-09-13 NOTE — ED AVS SNAPSHOT
[facility] Emergency Department  [address]  [facility] [address]  Phone:  [phone]  Fax:  [phone]                                    Adriane Garrett   MRN: [id number]    Department:  [facility] Emergency Department   Date of Visit:  9/13/2020           After Visit Summary Signature Page    I have received my discharge instructions, and my questions have been answered. I have discussed any challenges I see with this plan with the nurse or doctor.    ..........................................................................................................................................  Patient/Patient Representative Signature      ..........................................................................................................................................  Patient Representative Print Name and Relationship to Patient    ..................................................               ................................................  Date                                   Time    ..........................................................................................................................................  Reviewed by Signature/Title    ...................................................              ..............................................  Date                                               Time          22EPIC Rev 08/18

## 2020-09-13 NOTE — ED TRIAGE NOTES
Pt arrives to the ED due to RUQ abdominal pain that began around 0900. Pain is constant and feels similar to when she had pancreatitis a year ago. Denies nausea/vomiting or diarrhea. Tried tylenol and ibuprofen without relief. Pt denies any alcohol use.

## 2020-09-13 NOTE — DISCHARGE INSTRUCTIONS
Eat a clear liquid diet for a couple of days. Take Sucralfate to coat the stomach.  If not improving, see Dr. Becerra for follow up. I placed a referral in the computer.     Discharge Instructions  Abdominal Pain    Abdominal pain (belly pain) can be caused by many things. Your evaluation today does not show the exact cause for your pain. Your provider today has decided that it is unlikely your pain is due to a life threatening problem, or a problem requiring surgery or hospital admission. Sometimes those problems cannot be found right away, so it is very important that you follow up as directed.  Sometimes only the changes which occur over time allow the cause of your pain to be found.    Generally, every Emergency Department visit should have a follow-up clinic visit with either a primary or a specialty clinic/provider. Please follow-up as instructed by your emergency provider today. With abdominal pain, we often recommend very close follow-up, such as the following day.    ADULTS:  Return to the Emergency Department right away if:    You get an oral temperature above 102oF or as directed by your provider.  You have blood in your stools. This may be bright red or appear as black, tarry stools.    You keep vomiting (throwing up) or cannot drink liquids.  You see blood when you vomit.   You cannot have a bowel movement or you cannot pass gas.  Your stomach gets bloated or bigger.  Your skin or the whites of your eyes look yellow.  You faint.  You have bloody, frequent or painful urination (peeing).  You have new symptoms or anything that worries you.    CHILDREN:  Return to the Emergency Department right away if your child has any of the above-listed symptoms or the following:    Pushes your hand away or screams/cries when his/her belly is touched.  You notice your child is very fussy or weak.  Your child is very tired and is too tired to eat or drink.  Your child is dehydrated.  Signs of dehydration can  be:  Significant change in the amount of wet diapers/urine.  Your infant or child starts to have dry mouth and lips, or no saliva (spit) or tears.    PREGNANT WOMEN:  Return to the Emergency Department right away if you have any of the above-listed symptoms or the following:    You have bleeding, leaking fluid or passing tissue from the vagina.  You have worse pain or cramping, or pain in your shoulder or back.  You have vomiting that will not stop.  You have a temperature of 100oF or more.  Your baby is not moving as much as usual.  You faint.  You get a bad headache with or without eye problems and abdominal pain.  You have a seizure.  You have unusual discharge from your vagina and abdominal pain.    Abdominal pain is pretty common during pregnancy.  Your pain may or may not be related to your pregnancy. You should follow-up closely with your OB provider so they can evaluate you and your baby.  Until you follow-up with your regular provider, do the following:     Avoid sex and do not put anything in your vagina.  Drink clear fluids.  Only take medications approved by your provider.    MORE INFORMATION:    Appendicitis:  A possible cause of abdominal pain in any person who still has their appendix is acute appendicitis. Appendicitis is often hard to diagnose.  Testing does not always rule out early appendicitis or other causes of abdominal pain. Close follow-up with your provider and re-evaluations may be needed to figure out the reason for your abdominal pain.    Follow-up:  It is very important that you make an appointment with your clinic and go to the appointment.  If you do not follow-up with your primary provider, it may result in missing an important development which could result in permanent injury or disability and/or lasting pain.  If there is any problem keeping your appointment, call your provider or return to the Emergency Department.    Medications:  Take your medications as directed by your  "provider today.  Before using over-the-counter medications, ask your provider and make sure to take the medications as directed.  If you have any questions about medications, ask your provider.    Diet:  Resume your normal diet as much as possible, but do not eat fried, fatty or spicy foods while you have pain.  Do not drink alcohol or have caffeine.  Do not smoke tobacco.    Probiotics: If you have been given an antibiotic, you may want to also take a probiotic pill or eat yogurt with live cultures. Probiotics have \"good bacteria\" to help your intestines stay healthy. Studies have shown that probiotics help prevent diarrhea (loose stools) and other intestine problems (including C. diff infection) when you take antibiotics. You can buy these without a prescription in the pharmacy section of the store.     If you were given a prescription for medicine here today, be sure to read all of the information (including the package insert) that comes with your prescription.  This will include important information about the medicine, its side effects, and any warnings that you need to know about.  The pharmacist who fills the prescription can provide more information and answer questions you may have about the medicine.  If you have questions or concerns that the pharmacist cannot address, please call or return to the Emergency Department.       Remember that you can always come back to the Emergency Department if you are not able to see your regular provider in the amount of time listed above, if you get any new symptoms, or if there is anything that worries you.    "

## 2020-09-13 NOTE — ED PROVIDER NOTES
History     Chief Complaint:  Abdominal Pain    The history is provided by the patient.      Adriane Garrett is a 45 year old female with a history of pancreatitis who presents with upper abdominal pain that has been worsening since onset at 9:00 a.m this morning. The patient describes the pain as sharp and stabbing, which she rates at 9/10, and notes that if feels like past episode of pancreatitis. Her pain is worse on her right side, and radiates from her abdomen towards the back. She tried taking Tylenol and ibuprofen together but this did not help control her pain. Here, she additionally endorses nausea but denies any episodes of vomiting. She further denies any lower abdominal pain, diarrhea, constipation, fever, chills, chest pain, shortness of breath, or urinary symptoms. She rarely drinks and has had no recent alcohol consumption. The patient has no known heart problems or clotting issues.    Allergies:  Ampicillin  Aspirin  Varenicline  Lamotrigine  Ranitidine  Penicillins  Ciprofloxacin    Medications:    Metoprolol succinate  Omeprazole  Lisinopril  Hydroxyzine    Past Medical History:    IBS  Pancreatitis  GERD  COPD  Asthma  Depression  Ovarian cysts  Hypertension  Darier disease  Bipolar 1 disorder  Migraines  Endometriosis  Herpes zoster    Past Surgical History:    Cholecystectomy  Hysterectomy  Tubal ligation  Right foot subtalar arthroereisis  Diagnostic abdominal laparoscopy  Bilateral myringotomy with tube placement  EGD with biopsy    Family History:    Father - stroke, aneurysm  Mother - diabetes  Brother - asthma, heart disease  Sister - asthma, diabetes    Social History:  The patient was not accompanied to the ED.  Smoking Status: Former user  Smokeless Tobacco: Former user  Alcohol Use: Yes - rare  Drug Use: Not currently  Marital Status:      Review of Systems   Constitutional: Negative for chills and fever.   Respiratory: Negative for shortness of breath.    Cardiovascular: Negative  for chest pain.   Gastrointestinal: Positive for abdominal pain and nausea. Negative for constipation, diarrhea and vomiting.   Genitourinary: Negative for decreased urine volume, difficulty urinating, dysuria, frequency, hematuria and urgency.   Musculoskeletal: Positive for back pain (radiating from abdomen).   All other systems reviewed and are negative.    Physical Exam     Patient Vitals for the past 24 hrs:   BP Temp Temp src Pulse Resp SpO2 Weight   09/13/20 1917 -- -- -- -- -- 97 % --   09/13/20 1915 (!) 150/56 -- -- 57 -- 93 % --   09/13/20 1845 126/69 -- -- (!) 48 -- 95 % --   09/13/20 1800 121/69 -- -- 72 -- 93 % --   09/13/20 1745 (!) 150/76 -- -- 51 -- 96 % --   09/13/20 1715 -- -- -- -- -- 94 % --   09/13/20 1700 -- -- -- -- -- 95 % --   09/13/20 1645 -- -- -- -- -- 92 % --   09/13/20 1630 (!) 146/71 -- -- (!) 47 -- 97 % --   09/13/20 1545 (!) 184/93 98  F (36.7  C) Oral 52 18 97 % 115.9 kg (255 lb 8.2 oz)     Physical Exam  General: Alert and interactive. Appears comfortable but states she is in 9/10 pain.   Eyes: The pupils are equal and round. EOMs intact. No scleral icterus.  ENT: No abnormalities to the external nose or ears. Mucous membranes moist. Posterior oropharynx is non-erythematous.  Neck: Trachea is in the midline. No nuchal rigidity.     CV: Regular rate and rhythm. S1 and S2 normal without murmur, click, gallop or rub.   Resp: Breath sounds are clear bilaterally, without rhonchi, wheezes, rales. Non-labored, no retractions or accessory muscle use.     GI: Abdomen is soft without distension. Tenderness to palpation in epigastrium and RUQ. No significant guarding.   MS: Moving all extremities well. Good muscle tone.   Skin: Warm and dry. No rash or lesions noted.  Neuro: Alert and oriented x 3. No focal neurologic deficits. Good strength and sensation in upper and lower extremities. Psych: Awake. Alert.  Normal affect. Appropriate interactions.  Lymph: No anterior or posterior cervical  lymphadenopathy noted.    Emergency Department Course     Imaging:  Radiology findings were communicated with the patient who voiced understanding of the findings.    CT Abdomen Pelvis w Contrast:  1.  No acute abnormality in the abdomen or pelvis.  Report per radiology.    Laboratory:  Laboratory findings were communicated with the patient who voiced understanding of the findings.    CBC: WBC 5.6, HGB 12.5,   CMP: AWNL (Creatinine 0.70)    Lipase: 57 (L)    UA with micro: WNL    Interventions:  1619 NS 1L IV Bolus  1630 Zofran 4 mg IV  1630 Dilaudid 0.5 mg IV  1742 Dilaudid 0.5 mg IV    Emergency Department Course:  Past medical records, nursing notes, and vitals reviewed.    1602 I performed an exam of the patient as documented above.     IV was inserted and blood was drawn for laboratory testing, results above.    The patient provided a urine sample here in the emergency department. This was sent for laboratory testing, findings above.    The patient was sent for a CT of the abdomen and pelvis while in the emergency department, results above.     1740 I rechecked the patient and discussed the results of her workup thus far.     1848 Patient rechecked and updated.     Findings and plan explained to the Patient. Patient discharged home with instructions regarding supportive care, medications, and reasons to return. The importance of close follow-up was reviewed. The patient was prescribed Zofran, oxycodone, and Carafate.    I personally reviewed the laboratory and imaging results with the Patient and answered all related questions prior to discharge.     Impression & Plan     Medical Decision Making:  Adriane Garrett is a 45 year old female who presents for evaluation of epigastric abdominal pain. Per chart review, the patient has been seen for this multiple times in the emergency department without definitive diagnosis. She is s/p cholecystectomy. She states that she was admitted in the past for pancreatitis  and feels this is similar.  Laboratory work was obtained that shows no signs of leukocytosis, anemia, renal dysfunction, lipase elevation, or hepatic abnormalities. Her urine shows no markers for infection. The patient states that she is in a high level of pain, but she has minimal tenderness on examination.  She has no chest pain or shortness of breath to suggest ACS, and with clear epigastric abdominal pain and no significant risk factors, I find cardiopulmonary etiology to be unlikely. The patient is PERC negative and low risk per Wells criteria, making PE an unlikely etiology for her epigastric abdominal pain. The patient was given 2 doses of Dilaudid and feels improved. CT was obtained that shows no inflammation surrounding the pancreas to suggest pancreatitis and no other signs of acute intra-abdominal pathology. At this point, I feel she is stable for discharge home.  I will give her a couple pills of oxycodone to use at home along with sucralfate to help coat the stomach. I placed a referral in the computer for gastroenterology, as I feel as though she would benefit from specialist follow-up.Return for worsening pain, fever, chills, vomiting.     Diagnosis:    ICD-10-CM    1. Abdominal pain, epigastric  R10.13 GASTROENTEROLOGY ADULT REF CONSULT ONLY   2. RUQ abdominal pain  R10.11 GASTROENTEROLOGY ADULT REF CONSULT ONLY       Disposition:  Discharged to home.    Discharge Medications:  New Prescriptions    ONDANSETRON (ZOFRAN ODT) 4 MG ODT TAB    Take 1 tablet (4 mg) by mouth every 8 hours as needed for nausea    OXYCODONE (ROXICODONE) 5 MG TABLET    Take 1 tablet (5 mg) by mouth every 6 hours as needed for pain    SUCRALFATE (CARAFATE) 1 GM TABLET    Take 1 tablet (1 g) by mouth 4 times daily for 7 days       Scribe Disclosure:  I, Genevieve Kulkarni, am serving as a scribe at 4:02 PM on 9/13/2020 to document services personally performed by Gregoria Dawkins PA-C, based on my observations and the  provider's statements to me.     Genevieve Kulkarni  9/13/2020   [facility] EMERGENCY DEPARTMENT       Gregoria Dawkins PA-C  09/14/20 0141

## 2020-09-28 ENCOUNTER — HOSPITAL ENCOUNTER (EMERGENCY)
Facility: CLINIC | Age: 45
Discharge: HOME | End: 2020-09-29
Attending: EMERGENCY MEDICINE | Admitting: EMERGENCY MEDICINE

## 2020-09-28 ENCOUNTER — APPOINTMENT (OUTPATIENT)
Dept: CT IMAGING | Facility: CLINIC | Age: 45
End: 2020-09-28
Attending: EMERGENCY MEDICINE

## 2020-09-28 VITALS
SYSTOLIC BLOOD PRESSURE: 122 MMHG | HEART RATE: 56 BPM | TEMPERATURE: 97.9 F | DIASTOLIC BLOOD PRESSURE: 54 MMHG | RESPIRATION RATE: 18 BRPM | OXYGEN SATURATION: 96 %

## 2020-09-28 DIAGNOSIS — R51.9 ACUTE NONINTRACTABLE HEADACHE, UNSPECIFIED HEADACHE TYPE: ICD-10-CM

## 2020-09-28 LAB
ANION GAP SERPL CALCULATED.3IONS-SCNC: 6 MMOL/L (ref 3–14)
B-HCG FREE SERPL-ACNC: <5 IU/L
BASOPHILS # BLD AUTO: 0 10E9/L (ref 0–0.2)
BASOPHILS NFR BLD AUTO: 0.3 %
BUN SERPL-MCNC: 8 MG/DL (ref 7–30)
CALCIUM SERPL-MCNC: 8.7 MG/DL (ref 8.5–10.1)
CHLORIDE SERPL-SCNC: 109 MMOL/L (ref 94–109)
CO2 SERPL-SCNC: 26 MMOL/L (ref 20–32)
CREAT SERPL-MCNC: 0.64 MG/DL (ref 0.52–1.04)
DIFFERENTIAL METHOD BLD: ABNORMAL
EOSINOPHIL # BLD AUTO: 0.1 10E9/L (ref 0–0.7)
EOSINOPHIL NFR BLD AUTO: 1.5 %
ERYTHROCYTE [DISTWIDTH] IN BLOOD BY AUTOMATED COUNT: 13.1 % (ref 10–15)
GFR SERPL CREATININE-BSD FRML MDRD: >90 ML/MIN/{1.73_M2}
GLUCOSE SERPL-MCNC: 104 MG/DL (ref 70–99)
HCT VFR BLD AUTO: 41.4 % (ref 35–47)
HGB BLD-MCNC: 12.8 G/DL (ref 11.7–15.7)
IMM GRANULOCYTES # BLD: 0 10E9/L (ref 0–0.4)
IMM GRANULOCYTES NFR BLD: 0.3 %
LYMPHOCYTES # BLD AUTO: 2.9 10E9/L (ref 0.8–5.3)
LYMPHOCYTES NFR BLD AUTO: 46.7 %
MCH RBC QN AUTO: 29.2 PG (ref 26.5–33)
MCHC RBC AUTO-ENTMCNC: 30.9 G/DL (ref 31.5–36.5)
MCV RBC AUTO: 95 FL (ref 78–100)
MONOCYTES # BLD AUTO: 0.4 10E9/L (ref 0–1.3)
MONOCYTES NFR BLD AUTO: 6.8 %
NEUTROPHILS # BLD AUTO: 2.7 10E9/L (ref 1.6–8.3)
NEUTROPHILS NFR BLD AUTO: 44.4 %
NRBC # BLD AUTO: 0 10*3/UL
NRBC BLD AUTO-RTO: 0 /100
PLATELET # BLD AUTO: 364 10E9/L (ref 150–450)
POTASSIUM SERPL-SCNC: 3.6 MMOL/L (ref 3.4–5.3)
RBC # BLD AUTO: 4.38 10E12/L (ref 3.8–5.2)
SODIUM SERPL-SCNC: 141 MMOL/L (ref 133–144)
WBC # BLD AUTO: 6.2 10E9/L (ref 4–11)

## 2020-09-28 PROCEDURE — 85025 COMPLETE CBC W/AUTO DIFF WBC: CPT | Performed by: EMERGENCY MEDICINE

## 2020-09-28 PROCEDURE — 25000128 H RX IP 250 OP 636: Performed by: EMERGENCY MEDICINE

## 2020-09-28 PROCEDURE — 25000125 ZZHC RX 250: Performed by: EMERGENCY MEDICINE

## 2020-09-28 PROCEDURE — 96375 TX/PRO/DX INJ NEW DRUG ADDON: CPT

## 2020-09-28 PROCEDURE — 25000132 ZZH RX MED GY IP 250 OP 250 PS 637: Performed by: EMERGENCY MEDICINE

## 2020-09-28 PROCEDURE — 25800030 ZZH RX IP 258 OP 636: Performed by: EMERGENCY MEDICINE

## 2020-09-28 PROCEDURE — 84702 CHORIONIC GONADOTROPIN TEST: CPT

## 2020-09-28 PROCEDURE — 96361 HYDRATE IV INFUSION ADD-ON: CPT

## 2020-09-28 PROCEDURE — 99285 EMERGENCY DEPT VISIT HI MDM: CPT | Mod: 25

## 2020-09-28 PROCEDURE — 70496 CT ANGIOGRAPHY HEAD: CPT

## 2020-09-28 PROCEDURE — 80048 BASIC METABOLIC PNL TOTAL CA: CPT | Performed by: EMERGENCY MEDICINE

## 2020-09-28 PROCEDURE — 70450 CT HEAD/BRAIN W/O DYE: CPT

## 2020-09-28 PROCEDURE — 96374 THER/PROPH/DIAG INJ IV PUSH: CPT | Mod: 59

## 2020-09-28 RX ORDER — ACETAMINOPHEN 500 MG
1000 TABLET ORAL ONCE
Status: COMPLETED | OUTPATIENT
Start: 2020-09-28 | End: 2020-09-28

## 2020-09-28 RX ORDER — KETOROLAC TROMETHAMINE 15 MG/ML
15 INJECTION, SOLUTION INTRAMUSCULAR; INTRAVENOUS ONCE
Status: COMPLETED | OUTPATIENT
Start: 2020-09-28 | End: 2020-09-28

## 2020-09-28 RX ORDER — IOPAMIDOL 755 MG/ML
500 INJECTION, SOLUTION INTRAVASCULAR ONCE
Status: COMPLETED | OUTPATIENT
Start: 2020-09-28 | End: 2020-09-28

## 2020-09-28 RX ORDER — DIPHENHYDRAMINE HYDROCHLORIDE 50 MG/ML
25 INJECTION INTRAMUSCULAR; INTRAVENOUS ONCE
Status: COMPLETED | OUTPATIENT
Start: 2020-09-28 | End: 2020-09-28

## 2020-09-28 RX ORDER — METOCLOPRAMIDE HYDROCHLORIDE 5 MG/ML
10 INJECTION INTRAMUSCULAR; INTRAVENOUS ONCE
Status: COMPLETED | OUTPATIENT
Start: 2020-09-28 | End: 2020-09-28

## 2020-09-28 RX ADMIN — METOCLOPRAMIDE HYDROCHLORIDE 10 MG: 5 INJECTION INTRAMUSCULAR; INTRAVENOUS at 22:11

## 2020-09-28 RX ADMIN — SODIUM CHLORIDE 1000 ML: 9 INJECTION, SOLUTION INTRAVENOUS at 22:47

## 2020-09-28 RX ADMIN — SODIUM CHLORIDE 80 ML: 9 INJECTION, SOLUTION INTRAVENOUS at 22:30

## 2020-09-28 RX ADMIN — ACETAMINOPHEN 1000 MG: 500 TABLET, FILM COATED ORAL at 22:11

## 2020-09-28 RX ADMIN — DIPHENHYDRAMINE HYDROCHLORIDE 25 MG: 50 INJECTION, SOLUTION INTRAMUSCULAR; INTRAVENOUS at 22:11

## 2020-09-28 RX ADMIN — IOPAMIDOL 70 ML: 755 INJECTION, SOLUTION INTRAVENOUS at 22:30

## 2020-09-28 RX ADMIN — KETOROLAC TROMETHAMINE 15 MG: 15 INJECTION, SOLUTION INTRAMUSCULAR; INTRAVENOUS at 23:47

## 2020-09-28 ASSESSMENT — ENCOUNTER SYMPTOMS
PHOTOPHOBIA: 1
FEVER: 0
NUMBNESS: 0
SORE THROAT: 0
HEADACHES: 1
FATIGUE: 0
CHILLS: 0
NAUSEA: 1
DIZZINESS: 0
COUGH: 0
VOMITING: 0
WEAKNESS: 0

## 2020-09-28 NOTE — ED AVS SNAPSHOT
[facility] Emergency Department  [address]  [facility] [address]  Phone:  [phone]  Fax:  [phone]                                    Adriane Garrett   MRN: [id number]    Department:  [facility] Emergency Department   Date of Visit:  9/28/2020           After Visit Summary Signature Page    I have received my discharge instructions, and my questions have been answered. I have discussed any challenges I see with this plan with the nurse or doctor.    ..........................................................................................................................................  Patient/Patient Representative Signature      ..........................................................................................................................................  Patient Representative Print Name and Relationship to Patient    ..................................................               ................................................  Date                                   Time    ..........................................................................................................................................  Reviewed by Signature/Title    ...................................................              ..............................................  Date                                               Time          22EPIC Rev 08/18

## 2020-09-29 NOTE — ED PROVIDER NOTES
History     Chief Complaint:  Headache      HPI   Patient is a 45-year-old female with past medical history of migraines who presents to the emergency department with sudden onset posterior headache with photophobia.  Patient reports that she was actually waiting in the hospital parking lot for her  when she had sudden onset of headache and neck pain.  She reports that she has history of migraine headaches but notes that that is typically gradual in onset and in the front of her head.  She did have a slight headache yesterday that was relieved with caffeine use.  She reports that she has been drinking her normal amount of caffeine today and is still having a headache.  She notes associated nausea with no vomiting.  She notes some mild photo and phonophobia.  She denies any weakness in her arms or legs.  No significant sensation changes.  She denies any fever or chills.  She denies any trauma to her head.  Headache is not worse with laying down or worse in the morning.  She denies any vision changes.  Patient does report that she has family history of aneurysmal bleeds.       Allergies:  Ampicillin  Varenicline  Ciprofloxacin  Citalopram  Penicillins     Medications:    Metoprolol succinate  Omeprazole  Lisinopril  Hydroxyzine     Past Medical History:    IBS  Pancreatitis  GERD  COPD  Asthma  Depression  Ovarian cysts  Hypertension  Darier disease  Bipolar 1 disorder  Migraines  Endometriosis  Herpes zoster     Past Surgical History:    Cholecystectomy  Hysterectomy  Tubal ligation  Right foot subtalar arthroereisis  Diagnostic abdominal laparoscopy  Bilateral myringotomy with tube placement  EGD with biopsy     Family History:    Father - stroke, aneurysm  Mother - diabetes  Brother - asthma, heart disease  Sister - asthma, diabetes     Social History:  Smoking Status: Former user  Smokeless Tobacco: Former user  Alcohol Use: Yes - rare  Drug Use: Not currently  Marital Status:      Review of  Systems   Constitutional: Negative for chills, fatigue and fever.   HENT: Negative for sore throat.    Eyes: Positive for photophobia. Negative for visual disturbance.   Respiratory: Negative for cough.    Gastrointestinal: Positive for nausea. Negative for vomiting.   Neurological: Positive for headaches. Negative for dizziness, weakness and numbness.   All other systems reviewed and are negative.    Physical Exam     Patient Vitals for the past 24 hrs:   BP Temp Temp src Pulse Resp SpO2   09/28/20 2330 122/54 -- -- 56 -- 96 %   09/28/20 2300 (!) 142/70 -- -- 55 -- 98 %   09/28/20 2215 (!) 143/82 -- -- -- -- 99 %   09/28/20 2058 (!) 164/106 97.9  F (36.6  C) Oral 60 18 94 %     Physical Exam  General: Patient is awake, alert and interactive when I enter the room  Head: The scalp, face, and head appear normal  Eyes: The pupils are equal, round, and reactive to light. Conjunctivae and sclerae are normal. No nystagmus. Full ROM of both eyes and appears symmetric without obvious palsy.   ENT: External acoustic canals are normal. The oropharynx is normal without erythema. Uvula is in the midline  Neck: Normal range of motion. No anterior cervical lymphadenopathy noted  CV: Regular rate. S1/S2. No murmurs.   Resp: Lungs are clear without wheezes or rales. No respiratory distress.   GI: Abdomen is soft, no rigidity, guarding, or rebound. No distension. No tenderness to palpation in any quadrant.                   MS: Normal tone. Joints grossly normal without effusions. No asymmetric leg swelling, calf or thigh tenderness.    Skin: No rash or lesions noted. Normal capillary refill noted  Neuro:   Speech is normal and fluent.   Face is symmetric without droop. CN's II-XII intact. Negative pronator drift. Finger to nose intact.   Right Arm: Good  strength. 5/5 elbow flexion. 5/5 elbow extension. Sensation intact to light touch.   Left Arm: Good  strength. 5/5 elbow flexion. 5/5 elbow extension. Sensation intact to  light touch.   Right Le/5 straight leg raise, 5/5 knee flexion, 5/5 knee extension, 5/5 dorsiflexion, 5/5 plantar flexion. Sensation intact to light touch.   Left Le/5 straight leg raise, 5/5 knee flexion, 5/5 knee extension, 5/5 dorsiflexion, 5/5 plantar flexion. Sensation intact to light touch.   Psych:  Normal affect.  Appropriate interactions.    Emergency Department Course     Imaging:  Radiology findings were communicated with the patient who voiced understanding of the findings.    CTA Head Neck with Contrast  HEAD CT:  1.  Normal for age.  2.  No CT finding of a mass, infarct or hemorrhage.  HEAD CTA:   1.  No branch vessel occlusion, high-grade stenosis, aneurysm, or high flow vascular malformation involving proximal [place] vessels.  NECK CTA:  1.  No significant stenosis in the neck vessels based on NASCET criteria.  2.  No evidence for dissection.  Reading per radiology.    CT Head w/o Contrast  HEAD CT:  1.  Normal for age.  2.  No CT finding of a mass, infarct or hemorrhage.  HEAD CTA:   1.  No branch vessel occlusion, high-grade stenosis, aneurysm, or high flow vascular malformation involving proximal [place] vessels.  NECK CTA:  1.  No significant stenosis in the neck vessels based on NASCET criteria.  2.  No evidence for dissection.  Reading per radiology.    Laboratory:  Laboratory findings were communicated with the patient who voiced understanding of the findings.    CBC: WBC 6.2, HGB 12.8,   BMP: Glucose 104(H) o/w WNL (Creatinine 0.64)  ISTAT HCG Quantitative Pregnancy: <5.0    Interventions:  : Reglan, 10 mg, IV  : Tylenol, 1000 mg, Oral  : Benadryl, 25 mg, IV  : Normal Saline, 1 liter, IV bolus   : Toradol, 15 mg, IV    Emergency Department Course:    ED Course as of Sep 29 0010   Mon Sep 28, 2020   2151 Nursing notes and vitals reviewed.       I performed a physical exam of the patient as documented above.       IV was inserted and  blood was drawn for laboratory testing, results above.       2239 The patient was sent for a CT while in the emergency department, results above.          Findings and plan explained to the patient. Patient discharged home with instructions regarding supportive care, medications, and reasons to return. The importance of close follow-up was reviewed.    Impression & Plan      Medical Decision Making:  Adriane Garrett is a 45 year old female who presents to the emergency department today for evaluation of headache.  Patient reports a sudden onset of headache in the parking lot shortly before being evaluated in the emergency department.  Upon initial evaluation she is hypertensive but otherwise hemodynamically stable.  She is afebrile.  Her initial neurologic examination is normal without any deficits.  However given the nature of her headache onset I did elect to perform a CT of her head to rule out subarachnoid hemorrhage.  Thankfully this was negative.  Given that this was performed within 6 hours of the patient's presentation here I do not feel that we need to do a lumbar puncture to rule out subarachnoid hemorrhage.  Additionally patient was having some pain in her neck therefore CTA was also performed to rule out carotid or vertebral artery pathology.  Thankfully this was negative for any evidence of dissection.  Patient felt improved after the aforementioned interventions.  Her neuro exam remained normal and at this point I feel it is reasonable to discharge her home.     Diagnosis:    ICD-10-CM    1. Acute nonintractable headache, unspecified headache type  R51      Disposition:   Patient was discharged home.    Scribe Disclosure:  I, Paulo Sloan, am serving as a scribe at 10:10 PM on 9/28/2020 to document services personally performed by Liang Lawson MD based on my observations and the provider's statements to me.    [facility] EMERGENCY DEPARTMENT       Liang Lawson  MD  09/29/20 0205

## 2020-09-29 NOTE — ED TRIAGE NOTES
Here for sudden onset of headache to posterior head started yesterday associated with light/sound sensitivity. Pain went away but came back today while sitting in the parking log waiting for her  who was a patient here. History of migraine headaches, but this feel different. ABCs intact.

## 2020-12-01 ENCOUNTER — HOSPITAL ENCOUNTER (EMERGENCY)
Facility: CLINIC | Age: 45
Discharge: HOME | End: 2020-12-01
Attending: EMERGENCY MEDICINE | Admitting: EMERGENCY MEDICINE

## 2020-12-01 ENCOUNTER — APPOINTMENT (OUTPATIENT)
Dept: GENERAL RADIOLOGY | Facility: CLINIC | Age: 45
End: 2020-12-01
Attending: EMERGENCY MEDICINE

## 2020-12-01 ENCOUNTER — NURSE TRIAGE (OUTPATIENT)
Dept: NURSING | Facility: CLINIC | Age: 45
End: 2020-12-01

## 2020-12-01 VITALS
TEMPERATURE: 98.1 F | HEIGHT: 60 IN | RESPIRATION RATE: 20 BRPM | WEIGHT: 235 LBS | DIASTOLIC BLOOD PRESSURE: 86 MMHG | SYSTOLIC BLOOD PRESSURE: 167 MMHG | HEART RATE: 50 BPM | BODY MASS INDEX: 46.13 KG/M2 | OXYGEN SATURATION: 94 %

## 2020-12-01 DIAGNOSIS — R05.9 COUGH: ICD-10-CM

## 2020-12-01 DIAGNOSIS — R07.9 CHEST PAIN, UNSPECIFIED TYPE: ICD-10-CM

## 2020-12-01 DIAGNOSIS — Z20.822 PERSON UNDER INVESTIGATION FOR COVID-19: ICD-10-CM

## 2020-12-01 LAB
ANION GAP SERPL CALCULATED.3IONS-SCNC: 7 MMOL/L (ref 3–14)
BASOPHILS # BLD AUTO: 0 10E9/L (ref 0–0.2)
BASOPHILS NFR BLD AUTO: 0.6 %
BUN SERPL-MCNC: 9 MG/DL (ref 7–30)
CALCIUM SERPL-MCNC: 9.3 MG/DL (ref 8.5–10.1)
CHLORIDE SERPL-SCNC: 104 MMOL/L (ref 94–109)
CO2 SERPL-SCNC: 28 MMOL/L (ref 20–32)
CREAT SERPL-MCNC: 0.6 MG/DL (ref 0.52–1.04)
D DIMER PPP FEU-MCNC: 0.3 UG/ML FEU (ref 0–0.5)
DIFFERENTIAL METHOD BLD: ABNORMAL
EOSINOPHIL # BLD AUTO: 0.1 10E9/L (ref 0–0.7)
EOSINOPHIL NFR BLD AUTO: 1.2 %
ERYTHROCYTE [DISTWIDTH] IN BLOOD BY AUTOMATED COUNT: 13.2 % (ref 10–15)
GFR SERPL CREATININE-BSD FRML MDRD: >90 ML/MIN/{1.73_M2}
GLUCOSE SERPL-MCNC: 101 MG/DL (ref 70–99)
HCT VFR BLD AUTO: 42.7 % (ref 35–47)
HGB BLD-MCNC: 13.4 G/DL (ref 11.7–15.7)
IMM GRANULOCYTES # BLD: 0 10E9/L (ref 0–0.4)
IMM GRANULOCYTES NFR BLD: 0.3 %
LYMPHOCYTES # BLD AUTO: 3.1 10E9/L (ref 0.8–5.3)
LYMPHOCYTES NFR BLD AUTO: 47 %
MCH RBC QN AUTO: 29.3 PG (ref 26.5–33)
MCHC RBC AUTO-ENTMCNC: 31.4 G/DL (ref 31.5–36.5)
MCV RBC AUTO: 93 FL (ref 78–100)
MONOCYTES # BLD AUTO: 0.4 10E9/L (ref 0–1.3)
MONOCYTES NFR BLD AUTO: 6.1 %
NEUTROPHILS # BLD AUTO: 3 10E9/L (ref 1.6–8.3)
NEUTROPHILS NFR BLD AUTO: 44.8 %
NRBC # BLD AUTO: 0 10*3/UL
NRBC BLD AUTO-RTO: 0 /100
NT-PROBNP SERPL-MCNC: 21 PG/ML (ref 0–450)
PLATELET # BLD AUTO: 364 10E9/L (ref 150–450)
POTASSIUM SERPL-SCNC: 3.9 MMOL/L (ref 3.4–5.3)
RBC # BLD AUTO: 4.57 10E12/L (ref 3.8–5.2)
SODIUM SERPL-SCNC: 139 MMOL/L (ref 133–144)
TROPONIN I SERPL-MCNC: <0.015 UG/L (ref 0–0.04)
TSH SERPL DL<=0.005 MIU/L-ACNC: 1.8 MU/L (ref 0.4–4)
WBC # BLD AUTO: 6.6 10E9/L (ref 4–11)

## 2020-12-01 PROCEDURE — 93005 ELECTROCARDIOGRAM TRACING: CPT

## 2020-12-01 PROCEDURE — 84484 ASSAY OF TROPONIN QUANT: CPT | Performed by: EMERGENCY MEDICINE

## 2020-12-01 PROCEDURE — 85025 COMPLETE CBC W/AUTO DIFF WBC: CPT | Performed by: EMERGENCY MEDICINE

## 2020-12-01 PROCEDURE — 250N000011 HC RX IP 250 OP 636: Performed by: EMERGENCY MEDICINE

## 2020-12-01 PROCEDURE — 84443 ASSAY THYROID STIM HORMONE: CPT | Performed by: EMERGENCY MEDICINE

## 2020-12-01 PROCEDURE — C9803 HOPD COVID-19 SPEC COLLECT: HCPCS

## 2020-12-01 PROCEDURE — U0003 INFECTIOUS AGENT DETECTION BY NUCLEIC ACID (DNA OR RNA); SEVERE ACUTE RESPIRATORY SYNDROME CORONAVIRUS 2 (SARS-COV-2) (CORONAVIRUS DISEASE [COVID-19]), AMPLIFIED PROBE TECHNIQUE, MAKING USE OF HIGH THROUGHPUT TECHNOLOGIES AS DESCRIBED BY CMS-2020-01-R: HCPCS | Performed by: EMERGENCY MEDICINE

## 2020-12-01 PROCEDURE — 71045 X-RAY EXAM CHEST 1 VIEW: CPT

## 2020-12-01 PROCEDURE — 83880 ASSAY OF NATRIURETIC PEPTIDE: CPT | Performed by: EMERGENCY MEDICINE

## 2020-12-01 PROCEDURE — 85379 FIBRIN DEGRADATION QUANT: CPT | Performed by: EMERGENCY MEDICINE

## 2020-12-01 PROCEDURE — 99285 EMERGENCY DEPT VISIT HI MDM: CPT | Mod: 25

## 2020-12-01 PROCEDURE — 80048 BASIC METABOLIC PNL TOTAL CA: CPT | Performed by: EMERGENCY MEDICINE

## 2020-12-01 PROCEDURE — 258N000003 HC RX IP 258 OP 636: Performed by: EMERGENCY MEDICINE

## 2020-12-01 RX ORDER — KETOROLAC TROMETHAMINE 15 MG/ML
15 INJECTION, SOLUTION INTRAMUSCULAR; INTRAVENOUS ONCE
Status: COMPLETED | OUTPATIENT
Start: 2020-12-01 | End: 2020-12-01

## 2020-12-01 RX ORDER — BENZONATATE 200 MG/1
200 CAPSULE ORAL 3 TIMES DAILY PRN
Qty: 21 CAPSULE | Refills: 0 | Status: SHIPPED | OUTPATIENT
Start: 2020-12-01 | End: 2021-08-10

## 2020-12-01 RX ADMIN — KETOROLAC TROMETHAMINE 15 MG: 15 INJECTION, SOLUTION INTRAMUSCULAR; INTRAVENOUS at 19:40

## 2020-12-01 RX ADMIN — SODIUM CHLORIDE 1000 ML: 9 INJECTION, SOLUTION INTRAVENOUS at 19:42

## 2020-12-01 ASSESSMENT — ENCOUNTER SYMPTOMS
DYSURIA: 0
SHORTNESS OF BREATH: 0
ABDOMINAL PAIN: 0
BACK PAIN: 0
HEMATURIA: 0
SORE THROAT: 0
HEADACHES: 1
FREQUENCY: 0
COUGH: 1

## 2020-12-01 ASSESSMENT — MIFFLIN-ST. JEOR: SCORE: 1637.45

## 2020-12-01 NOTE — ED AVS SNAPSHOT
[facility] Emergency [facility]  [address]  [facility] [address]  Phone: [phone]  Fax: [phone]                                    Adriane Garrett   MRN: [id number]    Department: [facility] Emergency Dept   Date of Visit: 12/1/2020           After Visit Summary Signature Page    I have received my discharge instructions, and my questions have been answered. I have discussed any challenges I see with this plan with the nurse or doctor.    ..........................................................................................................................................  Patient/Patient Representative Signature      ..........................................................................................................................................  Patient Representative Print Name and Relationship to Patient    ..................................................               ................................................  Date                                   Time    ..........................................................................................................................................  Reviewed by Signature/Title    ...................................................              ..............................................  Date                                               Time          22EPIC Rev 08/18

## 2020-12-01 NOTE — TELEPHONE ENCOUNTER
Adriane calls in to report that she has been ill for 3 days or so with coughing, headache and chest pain and pressure.  She feels like she does when she has had bronchitis.She states it hurts so much, she wants to cry. She would like to go to ED now.  Per protocol that is appropriate care. She will let them know we  Suspect covid as well. Not diagnosed yet.      Reason for Disposition    SEVERE or constant chest pain or pressure (Exception: mild central chest pain, present only when coughing)    MODERATE difficulty breathing (e.g., speaks in phrases, SOB even at rest, pulse 100-120)    Additional Information    Negative: SEVERE difficulty breathing (e.g., struggling for each breath, speaks in single words)    Negative: Difficult to awaken or acting confused (e.g., disoriented, slurred speech)    Negative: Bluish (or gray) lips or face now    Negative: Shock suspected (e.g., cold/pale/clammy skin, too weak to stand, low BP, rapid pulse)    Negative: Sounds like a life-threatening emergency to the triager    Negative: [1] COVID-19 exposure AND [2] no symptoms    Negative: COVID-19 and Breastfeeding, questions about    Negative: [1] Adult with possible COVID-19 symptoms AND [2] triager concerned about severity of symptoms or other causes     [facility] Specific Disposition  - [facility] Specific Patient Instructions  COVID 19 Nurse Triage Plan/Patient Instructions    Please be aware that novel coronavirus (COVID-19) may be circulating in the community. If you develop symptoms such as fever, cough, or SOB or if you have concerns about the presence of another infection including coronavirus (COVID-19), please contact your health care provider or visit [email or web address].       ED Visit recommended. Follow protocol based instructions.     Bring Your Own Device:  Please also bring your smart device(s) (smart phones, tablets, laptops) and their charging cables for your personal use and to communicate with your care  team during your visit.    Thank you for taking steps to prevent the spread of this virus.  Limit your contact with others.  Wear a simple mask to cover your cough.  Wash your hands well and often.    Resources  [facility]: About COVID-19: [email or web address]  CDC: What to Do If You're Sick: www.cdc.gov/coronavirus/2019-ncov/about/steps-when-sick.html  CDC: Ending Home Isolation: www.cdc.gov/coronavirus/2019-ncov/hcp/disposition-in-home-patients.html   CDC: Caring for Someone: www.cdc.gov/coronavirus/2019-ncov/if-you-are-sick/care-for-someone.html   [facility]: Interim Guidance for Hospital Discharge to Home: [email or web address]/[email or web address]/hcp/hospdischarge.pdf  [facility] clinical trials (COVID-19 research studies): clinicalaffairs.[facility]-clinical-trials   Below are the COVID-19 hotlines at the [place] [facility] ([facility]). Interpreters are available.   For health questions: Call 283-010-4274 or 1-510.171.8355 (7 a.m. to 7 p.m.)  For questions about schools and childcare: Call 651-973-4916 or 1-298.731.5780 (7 a.m. to 7 p.m.)    Protocols used: CORONAVIRUS (COVID-19) DIAGNOSED OR [id number] 8.4.20

## 2020-12-02 LAB
INTERPRETATION ECG - MUSE: NORMAL
SARS-COV-2 RNA SPEC QL NAA+PROBE: NOT DETECTED
SPECIMEN SOURCE: NORMAL

## 2020-12-02 NOTE — ED PROVIDER NOTES
History     Chief Complaint:  Chest Pain    HPI   Adriane Garrett is a 45 year old female who presents with chest pain. The patient reported that her chest pain, that she describes as like someone constantly sitting on her chest, started 3 days ago while she was sitting on her couch watching TV and has been gradually worsening since with associated headache and coughing. She states that her pain is not effected by exacerbation but is worsened by laying flat on her back so she has been sleeping with her head and chest elevated. The patient has been taking Dayquil and Nyquil without much relief. She denied any shortness of breath, sore throat, vision changes, back pain, rash, or urinary complaints. She feels like her symptoms are similar to when she had pneumonia in the past. She has not been tested for Covid-19 but her  was approximately a month ago and he was negative at the time.     Allergies:  Ampicillin  Aspirin  Chantix [Varenicline]  Ciprofloxacin  Penicillins     Medications:    The patient is currently on no regular medications.    Past Medical History:    IBS     Past Surgical History:    Cholecystectomy   Hysterectomy and tubal ligation     Family History:    No past pertinent family history.    Social History:  Smokeless tobacco use: Current   Alcohol use: Occasional  Drug use: No  Marital Status:   [2]     Review of Systems   HENT: Negative for sore throat.    Eyes: Negative for visual disturbance.   Respiratory: Positive for cough. Negative for shortness of breath.    Cardiovascular: Positive for chest pain.   Gastrointestinal: Negative for abdominal pain.   Genitourinary: Negative for dysuria, frequency and hematuria.   Musculoskeletal: Negative for back pain.   Skin: Negative for rash.   Neurological: Positive for headaches.   All other systems reviewed and are negative.    Physical Exam     Patient Vitals for the past 24 hrs:   BP Temp Temp src Pulse Resp SpO2 Height Weight   12/01/20  1943 -- -- -- 53 20 100 % -- --   12/01/20 1835 (!) 182/95 -- -- -- -- -- -- --   12/01/20 1833 -- 98.1  F (36.7  C) Oral 54 18 98 % 1.524 m (5') 106.6 kg (235 lb)     Physical Exam  General: Alert, appears well-developed and well-nourished. Cooperative.     In mild distress  HEENT:  Head:  Atraumatic  Ears:  External ears are normal  Mouth/Throat:  Oropharynx is without erythema or exudate and mucous membranes are moist.   Eyes:   Conjunctivae normal and EOM are normal. No scleral icterus.  CV:  Normal rate, regular rhythm, normal heart sounds and radial pulses are 2+ and symmetric.  No murmur.  Resp:  Breath sounds are clear bilaterally    Non-labored, no retractions or accessory muscle use  GI:  Obese.  Abdomen is soft, no distension, no tenderness. No rebound or guarding.  No CVA tenderness bilaterally  MS:  Normal range of motion. No edema.    Normal strength in all 4 extremities.     Back atraumatic.    No midline cervical, thoracic, or lumbar tenderness  Skin:  Warm and dry.  No rash or lesions noted.  Neuro: Alert. Normal strength.  GCS: 15  Psych:  Normal mood and affect.    Emergency Department Course   ECG:  Indication: Chest pain   Time: 1838  Vent. Rate 53 bpm. NC interval 172. QRS duration 108. QT/QTc 454/426. P-R-T axis 53 2 40.  Sinus bradycardia Otherwise normal ECG. Read time: 1842      Imaging:  Radiographic findings were communicated with the patient who voiced understanding of the findings.    XR Chest 1 view PORT:   Negative portable chest , as per radiology.        Laboratory:  Symptomatic COVID-19 Virus PCR Nasopharyngeal swab: pending     CBC: WBC: 6.6, HGB: 13.4, PLT: 364  BMP: Glucose 101 (H), o/w WNL (Creatinine: 0.60)    1921    Troponin: <0.015    D Dimer: 0.3  BNP: 21   TSH with Free T4 Reflex: 1.80    Interventions:  1940 Toradol 15 mg IV   1942 NS 1L IV     Emergency Department Course:  Nursing notes and vitals reviewed. (1915) I performed an exam of the patient as documented above.      IV inserted. Medicine administered as documented above. Blood drawn. This was sent to the lab for further testing, results above.    The patient was sent for a chest XR while in the emergency department, findings above.   EKG obtained in the ED, see results above.      (2025) I rechecked the patient and discussed the results of her workup thus far.     Findings and plan explained to the Patient. Patient discharged home with instructions regarding supportive care, medications, and reasons to return. The importance of close follow-up was reviewed. The patient was prescribed benzonatate.    I personally reviewed the laboratory results with the Patient and answered all related questions prior to discharge.     Impression & Plan    Covid-19  Adriane Garrett was evaluated during a global COVID-19 pandemic, which necessitated consideration that the patient might be at risk for infection with the SARS-CoV-2 virus that causes COVID-19.   Applicable protocols for evaluation were followed during the patient's care.   COVID-19 was considered as part of the patient's evaluation. The plan for testing is:  a test was obtained during this visit.    Medical Decision Making:  Patient is a 45-year-old female who presents with multiple days of ongoing midsternal chest heaviness and cough.  Patient is concerned for potential COVID-19 or other sinister infectious process.  Reassuringly EKG shows no concerning ischemic changes and troponin is undetectable.  In the setting of multiple days of ongoing chest pain symptoms and cough, low concern for ACS.  D-dimer within normal limits and lower concern for pulmonary embolism.  There are no signs of heart failure on exam and BNP within normal limits.  No evidence of thyroid dysfunction.  Renal function, electrolytes, and CBC all within normal limits.  Chest x-ray reassuringly shows no signs of focal infiltrates or spontaneous pneumothorax.  COVID-19 swab in process as her symptoms may  represent this in the setting of a worldwide pandemic.  Patient will be discharged with Tessalon Perles and close outpatient follow-up with her primary care provider in 1 week for recheck.  After all questions answered and return precautions understood, discharged home.      Diagnosis:    ICD-10-CM    1. Chest pain, unspecified type  R07.9    2. Person under investigation for COVID-19  Z20.828    3. Cough  R05        Disposition:  discharged to home    Discharge Medications:  New Prescriptions    BENZONATATE (TESSALON) 200 MG CAPSULE    Take 1 capsule (200 mg) by mouth 3 times daily as needed for cough     Scribe Disclosure:  eLah CHEUNG, am serving as a scribe on 12/1/2020 at 7:15 PM to personally document services performed by Warren Jacques MD  based on my observations and the provider's statements to me.     Leah Figueroa  12/1/2020   [facility] EMERGENCY DEPT       Warren Jacques MD  12/01/20 4397

## 2020-12-02 NOTE — ED TRIAGE NOTES
Pt presents for several days of worsening chest pressure. She describes this sensation as someone sitting on her chest. States she has been coughing and is short of breath. Describes mild nausea without vomiting. ABC intact, A&Ox4.

## 2020-12-13 ENCOUNTER — HEALTH MAINTENANCE LETTER (OUTPATIENT)
Age: 45
End: 2020-12-13

## 2021-01-05 ENCOUNTER — APPOINTMENT (OUTPATIENT)
Dept: CT IMAGING | Facility: CLINIC | Age: 46
End: 2021-01-05
Attending: PHYSICIAN ASSISTANT

## 2021-01-05 ENCOUNTER — HOSPITAL ENCOUNTER (EMERGENCY)
Facility: CLINIC | Age: 46
Discharge: HOME | End: 2021-01-05
Attending: PHYSICIAN ASSISTANT | Admitting: PHYSICIAN ASSISTANT

## 2021-01-05 VITALS
TEMPERATURE: 97.8 F | RESPIRATION RATE: 16 BRPM | SYSTOLIC BLOOD PRESSURE: 156 MMHG | HEART RATE: 58 BPM | OXYGEN SATURATION: 98 % | DIASTOLIC BLOOD PRESSURE: 87 MMHG

## 2021-01-05 DIAGNOSIS — R10.84 ABDOMINAL PAIN, GENERALIZED: ICD-10-CM

## 2021-01-05 LAB
ALBUMIN SERPL-MCNC: 3.8 G/DL (ref 3.4–5)
ALBUMIN UR-MCNC: NEGATIVE MG/DL
ALP SERPL-CCNC: 87 U/L (ref 40–150)
ALT SERPL W P-5'-P-CCNC: 35 U/L (ref 0–50)
ANION GAP SERPL CALCULATED.3IONS-SCNC: 5 MMOL/L (ref 3–14)
APPEARANCE UR: CLEAR
AST SERPL W P-5'-P-CCNC: 26 U/L (ref 0–45)
BASOPHILS # BLD AUTO: 0 10E9/L (ref 0–0.2)
BASOPHILS NFR BLD AUTO: 0.4 %
BILIRUB SERPL-MCNC: 0.5 MG/DL (ref 0.2–1.3)
BILIRUB UR QL STRIP: NEGATIVE
BUN SERPL-MCNC: 9 MG/DL (ref 7–30)
CALCIUM SERPL-MCNC: 9 MG/DL (ref 8.5–10.1)
CHLORIDE SERPL-SCNC: 105 MMOL/L (ref 94–109)
CO2 SERPL-SCNC: 29 MMOL/L (ref 20–32)
COLOR UR AUTO: YELLOW
CREAT SERPL-MCNC: 0.59 MG/DL (ref 0.52–1.04)
DIFFERENTIAL METHOD BLD: NORMAL
EOSINOPHIL # BLD AUTO: 0.1 10E9/L (ref 0–0.7)
EOSINOPHIL NFR BLD AUTO: 1 %
ERYTHROCYTE [DISTWIDTH] IN BLOOD BY AUTOMATED COUNT: 13.3 % (ref 10–15)
GFR SERPL CREATININE-BSD FRML MDRD: >90 ML/MIN/{1.73_M2}
GLUCOSE SERPL-MCNC: 94 MG/DL (ref 70–99)
GLUCOSE UR STRIP-MCNC: NEGATIVE MG/DL
HCT VFR BLD AUTO: 42.1 % (ref 35–47)
HGB BLD-MCNC: 13.4 G/DL (ref 11.7–15.7)
HGB UR QL STRIP: ABNORMAL
IMM GRANULOCYTES # BLD: 0 10E9/L (ref 0–0.4)
IMM GRANULOCYTES NFR BLD: 0.3 %
KETONES UR STRIP-MCNC: NEGATIVE MG/DL
LEUKOCYTE ESTERASE UR QL STRIP: NEGATIVE
LIPASE SERPL-CCNC: 44 U/L (ref 73–393)
LYMPHOCYTES # BLD AUTO: 2.7 10E9/L (ref 0.8–5.3)
LYMPHOCYTES NFR BLD AUTO: 39.2 %
MCH RBC QN AUTO: 29.8 PG (ref 26.5–33)
MCHC RBC AUTO-ENTMCNC: 31.8 G/DL (ref 31.5–36.5)
MCV RBC AUTO: 94 FL (ref 78–100)
MONOCYTES # BLD AUTO: 0.4 10E9/L (ref 0–1.3)
MONOCYTES NFR BLD AUTO: 5.6 %
MUCOUS THREADS #/AREA URNS LPF: PRESENT /LPF
NEUTROPHILS # BLD AUTO: 3.7 10E9/L (ref 1.6–8.3)
NEUTROPHILS NFR BLD AUTO: 53.5 %
NITRATE UR QL: NEGATIVE
NRBC # BLD AUTO: 0 10*3/UL
NRBC BLD AUTO-RTO: 0 /100
PH UR STRIP: 5.5 PH (ref 5–7)
PLATELET # BLD AUTO: 397 10E9/L (ref 150–450)
POTASSIUM SERPL-SCNC: 3.8 MMOL/L (ref 3.4–5.3)
PROT SERPL-MCNC: 7.8 G/DL (ref 6.8–8.8)
RBC # BLD AUTO: 4.5 10E12/L (ref 3.8–5.2)
RBC #/AREA URNS AUTO: <1 /HPF (ref 0–2)
SODIUM SERPL-SCNC: 139 MMOL/L (ref 133–144)
SOURCE: ABNORMAL
SP GR UR STRIP: 1.02 (ref 1–1.03)
SQUAMOUS #/AREA URNS AUTO: 1 /HPF (ref 0–1)
UROBILINOGEN UR STRIP-MCNC: NORMAL MG/DL (ref 0–2)
WBC # BLD AUTO: 7 10E9/L (ref 4–11)
WBC #/AREA URNS AUTO: <1 /HPF (ref 0–5)

## 2021-01-05 PROCEDURE — 250N000009 HC RX 250: Performed by: PHYSICIAN ASSISTANT

## 2021-01-05 PROCEDURE — 99285 EMERGENCY DEPT VISIT HI MDM: CPT | Mod: 25

## 2021-01-05 PROCEDURE — 258N000003 HC RX IP 258 OP 636: Performed by: PHYSICIAN ASSISTANT

## 2021-01-05 PROCEDURE — 83690 ASSAY OF LIPASE: CPT | Performed by: PHYSICIAN ASSISTANT

## 2021-01-05 PROCEDURE — 250N000011 HC RX IP 250 OP 636: Performed by: PHYSICIAN ASSISTANT

## 2021-01-05 PROCEDURE — 96374 THER/PROPH/DIAG INJ IV PUSH: CPT | Mod: 59

## 2021-01-05 PROCEDURE — 74177 CT ABD & PELVIS W/CONTRAST: CPT

## 2021-01-05 PROCEDURE — 96361 HYDRATE IV INFUSION ADD-ON: CPT

## 2021-01-05 PROCEDURE — 96376 TX/PRO/DX INJ SAME DRUG ADON: CPT

## 2021-01-05 PROCEDURE — 85025 COMPLETE CBC W/AUTO DIFF WBC: CPT | Performed by: PHYSICIAN ASSISTANT

## 2021-01-05 PROCEDURE — 80053 COMPREHEN METABOLIC PANEL: CPT | Performed by: PHYSICIAN ASSISTANT

## 2021-01-05 PROCEDURE — 81001 URINALYSIS AUTO W/SCOPE: CPT | Performed by: PHYSICIAN ASSISTANT

## 2021-01-05 PROCEDURE — 96375 TX/PRO/DX INJ NEW DRUG ADDON: CPT

## 2021-01-05 RX ORDER — ONDANSETRON 2 MG/ML
4 INJECTION INTRAMUSCULAR; INTRAVENOUS EVERY 30 MIN PRN
Status: DISCONTINUED | OUTPATIENT
Start: 2021-01-05 | End: 2021-01-05 | Stop reason: HOSPADM

## 2021-01-05 RX ORDER — IOPAMIDOL 755 MG/ML
500 INJECTION, SOLUTION INTRAVASCULAR ONCE
Status: COMPLETED | OUTPATIENT
Start: 2021-01-05 | End: 2021-01-05

## 2021-01-05 RX ORDER — OXYCODONE HYDROCHLORIDE 5 MG/1
5 TABLET ORAL EVERY 6 HOURS PRN
Qty: 4 TABLET | Refills: 0 | Status: SHIPPED | OUTPATIENT
Start: 2021-01-05 | End: 2021-03-01

## 2021-01-05 RX ORDER — MORPHINE SULFATE 4 MG/ML
4 INJECTION, SOLUTION INTRAMUSCULAR; INTRAVENOUS
Status: DISCONTINUED | OUTPATIENT
Start: 2021-01-05 | End: 2021-01-05 | Stop reason: HOSPADM

## 2021-01-05 RX ADMIN — ONDANSETRON 4 MG: 2 INJECTION INTRAMUSCULAR; INTRAVENOUS at 16:09

## 2021-01-05 RX ADMIN — IOPAMIDOL 100 ML: 755 INJECTION, SOLUTION INTRAVENOUS at 16:29

## 2021-01-05 RX ADMIN — MORPHINE SULFATE 4 MG: 4 INJECTION INTRAVENOUS at 17:13

## 2021-01-05 RX ADMIN — MORPHINE SULFATE 4 MG: 4 INJECTION INTRAVENOUS at 16:09

## 2021-01-05 RX ADMIN — SODIUM CHLORIDE 1000 ML: 9 INJECTION, SOLUTION INTRAVENOUS at 16:09

## 2021-01-05 RX ADMIN — SODIUM CHLORIDE 65 ML: 9 INJECTION, SOLUTION INTRAVENOUS at 16:29

## 2021-01-05 ASSESSMENT — ENCOUNTER SYMPTOMS
LIGHT-HEADEDNESS: 0
ABDOMINAL PAIN: 1
HEMATURIA: 0
VOMITING: 1
DIZZINESS: 0
DIARRHEA: 1
DYSURIA: 0
APPETITE CHANGE: 1
SHORTNESS OF BREATH: 0
COUGH: 0
FEVER: 0

## 2021-01-05 NOTE — ED TRIAGE NOTES
RLQ with vomiting and diarrhea since last night. No fever. ABCDs intact. No chest pain or shortness of breath.

## 2021-01-05 NOTE — ED PROVIDER NOTES
History   Chief Complaint:  Abdominal Pain    HPI   Adriane Garrett is a 45 year old female with history of IBS, and surgical history of cholecystectomy and hysterectomy who presents with abdominal pain. The patient states she was siting on her cough watching TV when she had onset right lower quadrant abdominal pain with associated diarrhea and vomiting since last night. Her symptoms began at approximately 2200 which has been constant an radiate to her back and continues to persist. She has had some upper and lower abdominal pain in the past but nothing similar to the current abdominal pain. She has had several bouts of diarrhea and 3-4 vomiting. She has not eaten but is not able to drink fluids in large quantities. She has taken some Tylenol and last took a dose at 1200. Upon evaluation, she endorses dark brown stool which is very loose. She denies COVID exposures, lightheadedness, dizziness, cough, fever, shortness of breath, upper respiratory problems, vaginal discharge, hematuria, or dysuria. She rates the pain as a 9/10.  She denies a history of kidney stones, hypertension, or diabetes. She denies any known current pregnancies. No one in her home has similar symptoms. She denies recent antibiotics use. She smokes e-cigarettes. She denies tobacco, or illicit drug use. She denies any recent alcohol use but drinks rarely.     Review of Systems   Constitutional: Positive for appetite change (unable to drink large quantities of fluids). Negative for fever.   Respiratory: Negative for cough and shortness of breath.    Gastrointestinal: Positive for abdominal pain (RLQ), diarrhea and vomiting (3-4 bouts).   Genitourinary: Negative for dysuria, hematuria and vaginal discharge.   Neurological: Negative for dizziness and light-headedness.   All other systems reviewed and are negative.    Allergies:  Ampicillin  Aspirin  Chantix   Ciprofloxacin  Penicillins  Tessalon     Medications:  Benzonatate   Zofran   Oxycodone    She denies recent antibiotics use.     Past Medical History:    IBS    Past Surgical History:    Cholecystectomy   Hysterectomy and Tubal Ligation     Social History:  Patient arrives alone  She smokes e-cigarettes.   She denies tobacco, or illicit drug use.   She denies any recent alcohol use but drinks rarely.     Physical Exam     Patient Vitals for the past 24 hrs:   BP Temp Temp src Pulse Resp SpO2   01/05/21 1745 (!) 150/75 -- -- 57 -- 100 %   01/05/21 1730 (!) 167/75 -- -- 54 -- 100 %   01/05/21 1700 (!) 163/84 -- -- 59 -- 100 %   01/05/21 1645 (!) 162/69 -- -- 57 -- 100 %   01/05/21 1615 -- -- -- -- -- 99 %   01/05/21 1521 (!) 156/108 97.8  F (36.6  C) Oral 56 16 99 %       Physical Exam  General: Alert and oriented.   Head:  The scalp, face, and head appear normal   Eyes:  Conjunctivae and sclerae are normal    ENT:    The oropharynx is normal    Uvula is in the midline     MMM   CV:  Regular rate and rhythm     Normal S1/S2    Peripheral pulses intact in all 4 extremities    No peripheral edema  Resp:  Lungs are clear to auscultation    Non-labored    No rales or wheezing   GI:  Abdomen is soft, non-distended    Tenderness to the RLQ.     No additional abdominal tenderness   MS:  Normal muscular tone   Skin:  No rash or acute skin lesions noted   Neuro: Speech is normal and fluent.     Emergency Department Course     Imaging:  CT Abdomen Pelvis w Contrast:  1.  Normal appendix. No abnormality to explain the patient's pain. No  significant change from previous.  Reading per radiology    Laboratory:  CBC: WBC 7.0, HGB 13.4,   CMP: Glucose 94, o/w WNL (Creatinine: 0.59)    Lipase: 44 (L)    UA with Microscopic: Blood Trace (A), Mucous Present (A)    Emergency Department Course:    Reviewed:  I reviewed nursing notes and care everywhere    Assessments:  1547 I assessed the patient as above.     Interventions:  1609 0.9% NaCl bolus 1000 mL IV  1609 Morphine 4 mg IV  1609 Zofran 4 mg IV  1713 Morphine 4  mg IV    Disposition:  The patient was discharged to home.       Impression & Plan     Medical Decision Making:  Adriane Garrett is a 45 year old female who presents to the emergency department with abdominal pain.  Please refer to the HPI for full details.  Patient is hypertensive, but otherwise vitally stable and afebrile.  She is overall well-appearing.  She has mild tenderness to the right lower quadrant.  She states she had previous cholecystectomy and total hysterectomy. Broad differential was considered including but not limited to hepatitis, pancreatitis, gastritis, peptic ulcer disease, GERD, small bowel obstruction, UTI, pyelonephritis, appendicitis, intra-abdominal abscess, among others.  Basic labs here are reassuring.  UA is normal but evidence of infection.  CT is negative for any acute abnormalities.  I discussed the work-up with the patient and she expressed understanding.  I do not feel any further emergent work-up needs to be obtained at this time.  Patient feels improved after interventions here.  Overall, believe patient safe for discharge home.  I suggested she follow-up with a primary care doctor in the next couple of days.  Pamphlet was provided to the patient.  She is asked to call early tomorrow morning to get an appointment set up.  Small amount of oxycodone was sent with the patient.  Red flag symptoms, reasons to return discussed understood.  All questions were answered prior to discharge.  The patient understands agrees with plan.      Diagnosis:    ICD-10-CM    1. Abdominal pain, generalized  R10.84        Discharge Medications:  New Prescriptions    OXYCODONE (ROXICODONE) 5 MG TABLET    Take 1 tablet (5 mg) by mouth every 6 hours as needed for pain       Scribe Disclosure:  I, Bryan Silver, am serving as a scribe at 3:27 PM on 1/5/2021 to document services personally performed by Shanon Kong PA based on my observations and the provider's statements to me.             Shanon Kong PA-C  01/05/21 1943

## 2021-01-05 NOTE — ED AVS SNAPSHOT
[facility]  [address]  [facility] [address]  Phone: [phone]  Fax: [phone]                                    Adriane Garrett   MRN: [id number]    Department: [facility] Emergency [facility]   Date of Visit: 1/5/2021           After Visit Summary Signature Page    I have received my discharge instructions, and my questions have been answered. I have discussed any challenges I see with this plan with the nurse or doctor.    ..........................................................................................................................................  Patient/Patient Representative Signature      ..........................................................................................................................................  Patient Representative Print Name and Relationship to Patient    ..................................................               ................................................  Date                                   Time    ..........................................................................................................................................  Reviewed by Signature/Title    ...................................................              ..............................................  Date                                               Time          22EPIC Rev 08/18

## 2021-01-19 ENCOUNTER — APPOINTMENT (OUTPATIENT)
Dept: ULTRASOUND IMAGING | Facility: CLINIC | Age: 46
End: 2021-01-19
Attending: EMERGENCY MEDICINE

## 2021-01-19 ENCOUNTER — HOSPITAL ENCOUNTER (EMERGENCY)
Facility: CLINIC | Age: 46
Discharge: HOME | End: 2021-01-19
Attending: EMERGENCY MEDICINE | Admitting: EMERGENCY MEDICINE

## 2021-01-19 VITALS
OXYGEN SATURATION: 100 % | RESPIRATION RATE: 20 BRPM | HEIGHT: 60 IN | SYSTOLIC BLOOD PRESSURE: 147 MMHG | BODY MASS INDEX: 45.16 KG/M2 | DIASTOLIC BLOOD PRESSURE: 75 MMHG | TEMPERATURE: 97 F | WEIGHT: 230 LBS | HEART RATE: 59 BPM

## 2021-01-19 DIAGNOSIS — R10.9 ABDOMINAL PAIN, UNSPECIFIED ABDOMINAL LOCATION: ICD-10-CM

## 2021-01-19 LAB
ALBUMIN SERPL-MCNC: 3.6 G/DL (ref 3.4–5)
ALP SERPL-CCNC: 89 U/L (ref 40–150)
ALT SERPL W P-5'-P-CCNC: 39 U/L (ref 0–50)
ANION GAP SERPL CALCULATED.3IONS-SCNC: 5 MMOL/L (ref 3–14)
AST SERPL W P-5'-P-CCNC: 28 U/L (ref 0–45)
BASOPHILS # BLD AUTO: 0 10E9/L (ref 0–0.2)
BASOPHILS NFR BLD AUTO: 0.5 %
BILIRUB SERPL-MCNC: 0.3 MG/DL (ref 0.2–1.3)
BUN SERPL-MCNC: 9 MG/DL (ref 7–30)
CALCIUM SERPL-MCNC: 9.2 MG/DL (ref 8.5–10.1)
CHLORIDE SERPL-SCNC: 108 MMOL/L (ref 94–109)
CO2 SERPL-SCNC: 28 MMOL/L (ref 20–32)
CREAT SERPL-MCNC: 0.62 MG/DL (ref 0.52–1.04)
DIFFERENTIAL METHOD BLD: NORMAL
EOSINOPHIL # BLD AUTO: 0.1 10E9/L (ref 0–0.7)
EOSINOPHIL NFR BLD AUTO: 1.6 %
ERYTHROCYTE [DISTWIDTH] IN BLOOD BY AUTOMATED COUNT: 13.3 % (ref 10–15)
FLUAV RNA RESP QL NAA+PROBE: NEGATIVE
FLUBV RNA RESP QL NAA+PROBE: NEGATIVE
GFR SERPL CREATININE-BSD FRML MDRD: >90 ML/MIN/{1.73_M2}
GLUCOSE SERPL-MCNC: 106 MG/DL (ref 70–99)
HCG SERPL QL: NEGATIVE
HCT VFR BLD AUTO: 39.7 % (ref 35–47)
HGB BLD-MCNC: 12.7 G/DL (ref 11.7–15.7)
IMM GRANULOCYTES # BLD: 0 10E9/L (ref 0–0.4)
IMM GRANULOCYTES NFR BLD: 0.2 %
LABORATORY COMMENT REPORT: NORMAL
LIPASE SERPL-CCNC: 90 U/L (ref 73–393)
LYMPHOCYTES # BLD AUTO: 2.4 10E9/L (ref 0.8–5.3)
LYMPHOCYTES NFR BLD AUTO: 41.9 %
MCH RBC QN AUTO: 30 PG (ref 26.5–33)
MCHC RBC AUTO-ENTMCNC: 32 G/DL (ref 31.5–36.5)
MCV RBC AUTO: 94 FL (ref 78–100)
MONOCYTES # BLD AUTO: 0.4 10E9/L (ref 0–1.3)
MONOCYTES NFR BLD AUTO: 7.4 %
NEUTROPHILS # BLD AUTO: 2.7 10E9/L (ref 1.6–8.3)
NEUTROPHILS NFR BLD AUTO: 48.4 %
NRBC # BLD AUTO: 0 10*3/UL
NRBC BLD AUTO-RTO: 0 /100
PLATELET # BLD AUTO: 356 10E9/L (ref 150–450)
POTASSIUM SERPL-SCNC: 3.6 MMOL/L (ref 3.4–5.3)
PROT SERPL-MCNC: 7.3 G/DL (ref 6.8–8.8)
RBC # BLD AUTO: 4.24 10E12/L (ref 3.8–5.2)
RSV RNA SPEC QL NAA+PROBE: NORMAL
SARS-COV-2 RNA RESP QL NAA+PROBE: NEGATIVE
SODIUM SERPL-SCNC: 141 MMOL/L (ref 133–144)
SPECIMEN SOURCE: NORMAL
TROPONIN I SERPL-MCNC: <0.015 UG/L (ref 0–0.04)
WBC # BLD AUTO: 5.7 10E9/L (ref 4–11)

## 2021-01-19 PROCEDURE — 96374 THER/PROPH/DIAG INJ IV PUSH: CPT

## 2021-01-19 PROCEDURE — 99285 EMERGENCY DEPT VISIT HI MDM: CPT | Mod: 25

## 2021-01-19 PROCEDURE — 76705 ECHO EXAM OF ABDOMEN: CPT

## 2021-01-19 PROCEDURE — 87636 SARSCOV2 & INF A&B AMP PRB: CPT | Performed by: EMERGENCY MEDICINE

## 2021-01-19 PROCEDURE — 85025 COMPLETE CBC W/AUTO DIFF WBC: CPT | Performed by: EMERGENCY MEDICINE

## 2021-01-19 PROCEDURE — 80053 COMPREHEN METABOLIC PANEL: CPT | Performed by: EMERGENCY MEDICINE

## 2021-01-19 PROCEDURE — 84484 ASSAY OF TROPONIN QUANT: CPT | Performed by: EMERGENCY MEDICINE

## 2021-01-19 PROCEDURE — 258N000003 HC RX IP 258 OP 636: Performed by: EMERGENCY MEDICINE

## 2021-01-19 PROCEDURE — 93005 ELECTROCARDIOGRAM TRACING: CPT

## 2021-01-19 PROCEDURE — 250N000011 HC RX IP 250 OP 636: Performed by: EMERGENCY MEDICINE

## 2021-01-19 PROCEDURE — 96376 TX/PRO/DX INJ SAME DRUG ADON: CPT

## 2021-01-19 PROCEDURE — 250N000009 HC RX 250: Performed by: EMERGENCY MEDICINE

## 2021-01-19 PROCEDURE — 83690 ASSAY OF LIPASE: CPT | Performed by: EMERGENCY MEDICINE

## 2021-01-19 PROCEDURE — 96375 TX/PRO/DX INJ NEW DRUG ADDON: CPT

## 2021-01-19 PROCEDURE — 96361 HYDRATE IV INFUSION ADD-ON: CPT

## 2021-01-19 PROCEDURE — 84703 CHORIONIC GONADOTROPIN ASSAY: CPT | Performed by: EMERGENCY MEDICINE

## 2021-01-19 PROCEDURE — C9803 HOPD COVID-19 SPEC COLLECT: HCPCS

## 2021-01-19 PROCEDURE — 250N000013 HC RX MED GY IP 250 OP 250 PS 637: Performed by: EMERGENCY MEDICINE

## 2021-01-19 RX ORDER — HYDROMORPHONE HYDROCHLORIDE 1 MG/ML
0.5 INJECTION, SOLUTION INTRAMUSCULAR; INTRAVENOUS; SUBCUTANEOUS ONCE
Status: COMPLETED | OUTPATIENT
Start: 2021-01-19 | End: 2021-01-19

## 2021-01-19 RX ORDER — DIPHENHYDRAMINE HCL 25 MG
25 CAPSULE ORAL ONCE
Status: DISCONTINUED | OUTPATIENT
Start: 2021-01-19 | End: 2021-01-19

## 2021-01-19 RX ORDER — ONDANSETRON 2 MG/ML
4 INJECTION INTRAMUSCULAR; INTRAVENOUS ONCE
Status: COMPLETED | OUTPATIENT
Start: 2021-01-19 | End: 2021-01-19

## 2021-01-19 RX ORDER — ONDANSETRON 4 MG/1
4 TABLET, ORALLY DISINTEGRATING ORAL EVERY 8 HOURS PRN
Qty: 10 TABLET | Refills: 0 | Status: SHIPPED | OUTPATIENT
Start: 2021-01-19 | End: 2021-01-22

## 2021-01-19 RX ORDER — HYDROMORPHONE HYDROCHLORIDE 1 MG/ML
0.5 INJECTION, SOLUTION INTRAMUSCULAR; INTRAVENOUS; SUBCUTANEOUS ONCE
Status: DISCONTINUED | OUTPATIENT
Start: 2021-01-19 | End: 2021-01-19

## 2021-01-19 RX ORDER — FAMOTIDINE 20 MG/1
20 TABLET, FILM COATED ORAL DAILY
Qty: 10 TABLET | Refills: 0 | Status: SHIPPED | OUTPATIENT
Start: 2021-01-19 | End: 2021-08-10

## 2021-01-19 RX ORDER — DICYCLOMINE HYDROCHLORIDE 10 MG/1
20 CAPSULE ORAL ONCE
Status: COMPLETED | OUTPATIENT
Start: 2021-01-19 | End: 2021-01-19

## 2021-01-19 RX ORDER — SUCRALFATE 1 G/1
1 TABLET ORAL 4 TIMES DAILY
Qty: 20 TABLET | Refills: 0 | Status: SHIPPED | OUTPATIENT
Start: 2021-01-19 | End: 2021-01-24

## 2021-01-19 RX ADMIN — LIDOCAINE HYDROCHLORIDE 30 ML: 20 SOLUTION ORAL; TOPICAL at 20:11

## 2021-01-19 RX ADMIN — HYDROMORPHONE HYDROCHLORIDE 0.5 MG: 1 INJECTION, SOLUTION INTRAMUSCULAR; INTRAVENOUS; SUBCUTANEOUS at 18:47

## 2021-01-19 RX ADMIN — SODIUM CHLORIDE 1000 ML: 9 INJECTION, SOLUTION INTRAVENOUS at 18:46

## 2021-01-19 RX ADMIN — FAMOTIDINE 20 MG: 10 INJECTION, SOLUTION INTRAVENOUS at 18:47

## 2021-01-19 RX ADMIN — ONDANSETRON 4 MG: 2 INJECTION INTRAMUSCULAR; INTRAVENOUS at 18:47

## 2021-01-19 RX ADMIN — DICYCLOMINE HYDROCHLORIDE 20 MG: 10 CAPSULE ORAL at 20:10

## 2021-01-19 ASSESSMENT — ENCOUNTER SYMPTOMS
NAUSEA: 1
FEVER: 0
VOMITING: 0
COUGH: 0
DIARRHEA: 0
ABDOMINAL PAIN: 1
BLOOD IN STOOL: 0

## 2021-01-19 ASSESSMENT — MIFFLIN-ST. JEOR: SCORE: 1614.77

## 2021-01-19 NOTE — ED TRIAGE NOTES
Pt arrives with RUQ abdominal pain that started yesterday. Says it's worse after eating. Hx cholecystectomy, pancreatitis.

## 2021-01-19 NOTE — Clinical Note
Adriane Garrett was seen and treated in our emergency department on 1/19/2021.  She may return to work on 01/21/2021.       If you have any questions or concerns, please don't hesitate to call.      Bharati Dyson, DO

## 2021-01-20 LAB — INTERPRETATION ECG - MUSE: NORMAL

## 2021-01-20 NOTE — ED PROVIDER NOTES
History   Chief Complaint:  Abdominal pain     The history is provided by the patient.      Adriane Garrett is a 45 year old female with history of GERD, IBS, hypertension and hyperlipidemia who presents with abdominal pain. The patient tells us that yesterday she started to have sudden onset upper right quadrant abdominal pain. The patient also tells us that she has been feeling nauseous and having loose stools. She tells us that eating makes it much worse and that she has not taken any pain medications. Patient denies fever, cough, chest pain, black or bloody stool or vomiting.     To note, the patient has had her gall bladder removed and has a history of pancreatitis. No sick contacts.     CT abdomen 1/5/2021  IMPRESSION:   1.  Normal appendix. No abnormality to explain the patient's pain. No  significant change from previous.  Review of Systems   Constitutional: Negative for fever.   Respiratory: Negative for cough.    Cardiovascular: Negative for chest pain.   Gastrointestinal: Positive for abdominal pain and nausea. Negative for blood in stool, diarrhea and vomiting.   All other systems reviewed and are negative.      Allergies:  Ampicillin   Aspirin   Chantix  Cirpofloxacin   Penicillins  Varenicline     Medications:  Tessalon   Zofran   Roxicodone     Past Medical History:    IBS    GERD   Morbid obesity   Hyperlipidemia  Hypertension  Pancreatitis    Past Surgical History:    Cholecystectomy  Hysterectomy and tubal ligation     Family History:    The patient denies past family history.     Social History:  The patient does drink alcohol rarely.   Patient denies drug use.     Physical Exam     Patient Vitals for the past 24 hrs:   BP Temp Temp src Pulse Resp SpO2 Height Weight   01/19/21 2100 (!) 147/75 -- -- 59 -- 100 % -- --   01/19/21 2030 133/68 -- -- 55 -- 100 % -- --   01/19/21 2000 (!) 151/72 -- -- 57 -- 100 % -- --   01/19/21 1945 (!) 156/74 -- -- -- -- -- -- --   01/19/21 1900 (!) 157/60 -- -- 56  -- 99 % -- --   01/19/21 1853 (!) 181/87 -- -- 55 -- 98 % -- --   01/19/21 1749 (!) 155/112 97  F (36.1  C) Temporal 57 20 100 % 1.524 m (5') 104.3 kg (230 lb)       Physical Exam  Nursing note and vitals reviewed.  Constitutional: Well nourished.   Eyes: Conjunctiva normal.  Pupils are equal, round, and reactive to light.   ENT: Nose normal. Mucous membranes pink and moist.    Neck: Normal range of motion.  CVS: Normal rate, regular rhythm.  Normal heart sounds.  No murmur.  Pulmonary: Lungs clear to auscultation bilaterally. No wheezes/rales/rhonchi.  GI: Obese. Abdomen soft. Mild RUQ/epigastric tenderness. No rigidity or guarding.    MSK: No calf tenderness or swelling.  Neuro: Alert. Follows simple commands.  Skin: Skin is warm and dry. No rash noted.   Psychiatric: Normal affect.       Emergency Department Course     ECG:  Indication: Abdominal pain  Time: 1906  Vent. Rate 53 bpm. NM interval 188. QRS duration 106. QT/QTc 454/426. P-R-T axis 56 1 41. Sinus bradycardia. Otherwise normal ECG. Read time: 1906     Imaging:    US Abdomen Limited (RUQ):  1.  Normal limited abdominal ultrasound. Reading per radiology.     Laboratory:    CBC: WBC: 5.7, HGB: 12.7, PLT: 356    CMP: Glucose 106(H), o/w WNL (Creatinine: 0.62)    1837 Troponin - <0.015    Lipase - 90     COVID - 19 - Negative     Emergency Department Course:    Reviewed:  I reviewed nursing notes, vitals, past medical history and care everywhere    Assessments:  1809 I obtained history and examined the patient as noted above.   2106 I rechecked the patient and explained findings.     Interventions:  1847 Pepcid 20 mg IV  1847 Zofran 4 mg IV  1847 Dilaudid 0.5 mg IV   2010 Bentyl 20 mg PO  2011 GI Cocktail (Maalox/Mylanta and viscous Lidocaine), 30 mL suspension, PO     Disposition:  The patient was discharged to home.     Impression & Plan     Medical Decision Making:  Patient is a 45-year-old female presenting with abdominal pain predominantly in the right  upper quadrant/epigastric region.  She is nontoxic though noted to be hypertensive upon arrival to the ED.  EKG without focal ischemia or underlying arrhythmia.  Screening troponin negative, her presentation would be atypical of ACS.  Her labs are reassuring.  LFTs/lipase within normal limits. She has a history of cholecystectomy.  Ultrasound right upper quadrant reassuring as well.  She did recently undergo a CT abdomen less than 2 weeks ago I discussed with patient I do not feel that further emergent CT imaging is warranted today.  She is comfortable deferring at this time.  I do have concerns for more of a PUD/gastritis presentation.  Patient did report some symptom improvement during her time in the ED.  Review of records also shows she has a history of IBS.  At this time I will initiate Pepcid on dispel as well as Carafate.  Plans for close outpatient follow-up.  Dietary recommendations discussed.  She was tested for COVID-19 today and this resulted negative, clinically lower suspicion for that this etiology at this time.  We did discuss should patient's symptoms persist she may benefit from EGD in the future.  Planning close outpatient follow-up.  Return precautions given    Covid-19  Adriane Garrett was evaluated during a global COVID-19 pandemic, which necessitated consideration that the patient might be at risk for infection with the SARS-CoV-2 virus that causes COVID-19.   Applicable protocols for evaluation were followed during the patient's care.   COVID-19 was considered as part of the patient's evaluation. The plan for testing is:  a test was obtained during this visit.    Diagnosis:    ICD-10-CM    1. Abdominal pain, unspecified abdominal location  R10.9        Discharge Medications:  New Prescriptions    FAMOTIDINE (PEPCID) 20 MG TABLET    Take 1 tablet (20 mg) by mouth daily    ONDANSETRON (ZOFRAN ODT) 4 MG ODT TAB    Take 1 tablet (4 mg) by mouth every 8 hours as needed for nausea    SUCRALFATE  (CARAFATE) 1 GM TABLET    Take 1 tablet (1 g) by mouth 4 times daily for 5 days       Scribe Disclosure:  I, Angel Talbot, am serving as a scribe at 6:09 PM on 1/19/2021 to document services personally performed by Bharati Dyson DO, based on my observations and the provider's statements to me.            Bharati Dyson DO  01/19/21 6449

## 2021-03-01 ENCOUNTER — APPOINTMENT (OUTPATIENT)
Dept: CT IMAGING | Facility: CLINIC | Age: 46
End: 2021-03-01
Attending: PHYSICIAN ASSISTANT

## 2021-03-01 ENCOUNTER — HOSPITAL ENCOUNTER (EMERGENCY)
Facility: CLINIC | Age: 46
Discharge: HOME | End: 2021-03-01
Attending: PHYSICIAN ASSISTANT | Admitting: PHYSICIAN ASSISTANT

## 2021-03-01 VITALS
TEMPERATURE: 97.1 F | HEART RATE: 44 BPM | OXYGEN SATURATION: 100 % | RESPIRATION RATE: 18 BRPM | DIASTOLIC BLOOD PRESSURE: 84 MMHG | SYSTOLIC BLOOD PRESSURE: 151 MMHG

## 2021-03-01 DIAGNOSIS — R11.0 NAUSEA: ICD-10-CM

## 2021-03-01 DIAGNOSIS — R10.11 RUQ ABDOMINAL PAIN: ICD-10-CM

## 2021-03-01 LAB
ALBUMIN SERPL-MCNC: 3.6 G/DL (ref 3.4–5)
ALBUMIN UR-MCNC: NEGATIVE MG/DL
ALP SERPL-CCNC: 89 U/L (ref 40–150)
ALT SERPL W P-5'-P-CCNC: 47 U/L (ref 0–50)
ANION GAP SERPL CALCULATED.3IONS-SCNC: 7 MMOL/L (ref 3–14)
APPEARANCE UR: ABNORMAL
AST SERPL W P-5'-P-CCNC: 24 U/L (ref 0–45)
BACTERIA #/AREA URNS HPF: ABNORMAL /HPF
BASOPHILS # BLD AUTO: 0 10E9/L (ref 0–0.2)
BASOPHILS NFR BLD AUTO: 0.4 %
BILIRUB SERPL-MCNC: 0.4 MG/DL (ref 0.2–1.3)
BILIRUB UR QL STRIP: NEGATIVE
BUN SERPL-MCNC: 6 MG/DL (ref 7–30)
CALCIUM SERPL-MCNC: 9.3 MG/DL (ref 8.5–10.1)
CHLORIDE SERPL-SCNC: 106 MMOL/L (ref 94–109)
CO2 SERPL-SCNC: 26 MMOL/L (ref 20–32)
COLOR UR AUTO: ABNORMAL
CREAT SERPL-MCNC: 0.62 MG/DL (ref 0.52–1.04)
DIFFERENTIAL METHOD BLD: ABNORMAL
EOSINOPHIL # BLD AUTO: 0.1 10E9/L (ref 0–0.7)
EOSINOPHIL NFR BLD AUTO: 1.3 %
ERYTHROCYTE [DISTWIDTH] IN BLOOD BY AUTOMATED COUNT: 13.3 % (ref 10–15)
GFR SERPL CREATININE-BSD FRML MDRD: >90 ML/MIN/{1.73_M2}
GLUCOSE SERPL-MCNC: 110 MG/DL (ref 70–99)
GLUCOSE UR STRIP-MCNC: NEGATIVE MG/DL
HCT VFR BLD AUTO: 43.1 % (ref 35–47)
HGB BLD-MCNC: 13.3 G/DL (ref 11.7–15.7)
HGB UR QL STRIP: ABNORMAL
HYALINE CASTS #/AREA URNS LPF: 1 /LPF (ref 0–2)
IMM GRANULOCYTES # BLD: 0 10E9/L (ref 0–0.4)
IMM GRANULOCYTES NFR BLD: 0.2 %
KETONES UR STRIP-MCNC: NEGATIVE MG/DL
LEUKOCYTE ESTERASE UR QL STRIP: NEGATIVE
LIPASE SERPL-CCNC: 72 U/L (ref 73–393)
LYMPHOCYTES # BLD AUTO: 2.7 10E9/L (ref 0.8–5.3)
LYMPHOCYTES NFR BLD AUTO: 51.5 %
MCH RBC QN AUTO: 29.4 PG (ref 26.5–33)
MCHC RBC AUTO-ENTMCNC: 30.9 G/DL (ref 31.5–36.5)
MCV RBC AUTO: 95 FL (ref 78–100)
MONOCYTES # BLD AUTO: 0.4 10E9/L (ref 0–1.3)
MONOCYTES NFR BLD AUTO: 6.7 %
MUCOUS THREADS #/AREA URNS LPF: PRESENT /LPF
NEUTROPHILS # BLD AUTO: 2.1 10E9/L (ref 1.6–8.3)
NEUTROPHILS NFR BLD AUTO: 39.9 %
NITRATE UR QL: NEGATIVE
NRBC # BLD AUTO: 0 10*3/UL
NRBC BLD AUTO-RTO: 0 /100
PH UR STRIP: 5.5 PH (ref 5–7)
PLATELET # BLD AUTO: 344 10E9/L (ref 150–450)
POTASSIUM SERPL-SCNC: 3.5 MMOL/L (ref 3.4–5.3)
PROT SERPL-MCNC: 7.5 G/DL (ref 6.8–8.8)
RBC # BLD AUTO: 4.52 10E12/L (ref 3.8–5.2)
RBC #/AREA URNS AUTO: 1 /HPF (ref 0–2)
SODIUM SERPL-SCNC: 139 MMOL/L (ref 133–144)
SOURCE: ABNORMAL
SP GR UR STRIP: 1.01 (ref 1–1.03)
SQUAMOUS #/AREA URNS AUTO: 12 /HPF (ref 0–1)
TROPONIN I SERPL-MCNC: <0.015 UG/L (ref 0–0.04)
UROBILINOGEN UR STRIP-MCNC: NORMAL MG/DL (ref 0–2)
WBC # BLD AUTO: 5.2 10E9/L (ref 4–11)
WBC #/AREA URNS AUTO: 2 /HPF (ref 0–5)

## 2021-03-01 PROCEDURE — 80053 COMPREHEN METABOLIC PANEL: CPT | Performed by: EMERGENCY MEDICINE

## 2021-03-01 PROCEDURE — 250N000009 HC RX 250: Performed by: PHYSICIAN ASSISTANT

## 2021-03-01 PROCEDURE — 250N000011 HC RX IP 250 OP 636: Performed by: PHYSICIAN ASSISTANT

## 2021-03-01 PROCEDURE — 99285 EMERGENCY DEPT VISIT HI MDM: CPT | Mod: 25

## 2021-03-01 PROCEDURE — 81001 URINALYSIS AUTO W/SCOPE: CPT | Performed by: PHYSICIAN ASSISTANT

## 2021-03-01 PROCEDURE — 93005 ELECTROCARDIOGRAM TRACING: CPT

## 2021-03-01 PROCEDURE — 85025 COMPLETE CBC W/AUTO DIFF WBC: CPT | Performed by: EMERGENCY MEDICINE

## 2021-03-01 PROCEDURE — 96375 TX/PRO/DX INJ NEW DRUG ADDON: CPT

## 2021-03-01 PROCEDURE — 83690 ASSAY OF LIPASE: CPT | Performed by: EMERGENCY MEDICINE

## 2021-03-01 PROCEDURE — 96374 THER/PROPH/DIAG INJ IV PUSH: CPT | Mod: 59

## 2021-03-01 PROCEDURE — 74177 CT ABD & PELVIS W/CONTRAST: CPT

## 2021-03-01 PROCEDURE — 84484 ASSAY OF TROPONIN QUANT: CPT | Performed by: EMERGENCY MEDICINE

## 2021-03-01 RX ORDER — HYDROMORPHONE HYDROCHLORIDE 1 MG/ML
0.5 INJECTION, SOLUTION INTRAMUSCULAR; INTRAVENOUS; SUBCUTANEOUS
Status: DISCONTINUED | OUTPATIENT
Start: 2021-03-01 | End: 2021-03-01 | Stop reason: HOSPADM

## 2021-03-01 RX ORDER — ONDANSETRON 4 MG/1
4 TABLET, ORALLY DISINTEGRATING ORAL EVERY 8 HOURS PRN
Qty: 10 TABLET | Refills: 0 | Status: SHIPPED | OUTPATIENT
Start: 2021-03-01 | End: 2021-03-04

## 2021-03-01 RX ORDER — ONDANSETRON 2 MG/ML
4 INJECTION INTRAMUSCULAR; INTRAVENOUS EVERY 30 MIN PRN
Status: DISCONTINUED | OUTPATIENT
Start: 2021-03-01 | End: 2021-03-01 | Stop reason: HOSPADM

## 2021-03-01 RX ORDER — IOPAMIDOL 755 MG/ML
500 INJECTION, SOLUTION INTRAVASCULAR ONCE
Status: COMPLETED | OUTPATIENT
Start: 2021-03-01 | End: 2021-03-01

## 2021-03-01 RX ORDER — OXYCODONE HYDROCHLORIDE 5 MG/1
5 TABLET ORAL EVERY 6 HOURS PRN
Qty: 12 TABLET | Refills: 0 | Status: SHIPPED | OUTPATIENT
Start: 2021-03-01 | End: 2021-06-27

## 2021-03-01 RX ADMIN — ONDANSETRON 4 MG: 2 INJECTION INTRAMUSCULAR; INTRAVENOUS at 20:23

## 2021-03-01 RX ADMIN — SODIUM CHLORIDE 65 ML: 9 INJECTION, SOLUTION INTRAVENOUS at 20:30

## 2021-03-01 RX ADMIN — IOPAMIDOL 100 ML: 755 INJECTION, SOLUTION INTRAVENOUS at 20:29

## 2021-03-01 RX ADMIN — HYDROMORPHONE HYDROCHLORIDE 0.5 MG: 1 INJECTION, SOLUTION INTRAMUSCULAR; INTRAVENOUS; SUBCUTANEOUS at 20:23

## 2021-03-01 ASSESSMENT — ENCOUNTER SYMPTOMS
FREQUENCY: 0
VOMITING: 0
BLOOD IN STOOL: 0
DIFFICULTY URINATING: 0
NAUSEA: 1
FEVER: 0
SHORTNESS OF BREATH: 0
FLANK PAIN: 1
ABDOMINAL PAIN: 1
ABDOMINAL DISTENTION: 1
DIARRHEA: 1

## 2021-03-01 NOTE — ED TRIAGE NOTES
Patient presents to the ED reporting RUQ abdominal pain x 1 week that is worse with eating and muscle movements. Also reports diarrhea.

## 2021-03-02 LAB — INTERPRETATION ECG - MUSE: NORMAL

## 2021-03-02 NOTE — ED PROVIDER NOTES
History   Chief Complaint:  Abdominal Pain     The history is provided by the patient.      Adriane Garrett is a 45 year old female with history of IBS, hyperlipidemia, hypertension, and fatty liver disease who presents with abdominal pain. The patient explains that she has been having really bad pain in her right upper abdomen. She has tried diet changes, but that has not improved her symptoms. She explains that the pain has been constant since it started; it does get worse with eating and movement, but is slightly relieved with rest. She describes the pain as sharp and intense pressure that comes from the right side. This pain does radiate to the same area on her back. She rates this pain as an 8/10. With the pain, she has been experiencing nausea and diarrhea, but no vomiting. When the pain first started, she felt as though she needed to vomit, but could not actually do it. She has been feeling a little warmer than usual, but does not have a thermometer to check her temperature with. She took Tylenol around 1400 to help with the pain.     She denies any chest pain or shortness of breath, blood in her stool or vomit, and any abnormal urinary symptoms.     Review of Systems   Constitutional: Negative for fever.   Respiratory: Negative for shortness of breath.    Cardiovascular: Negative for chest pain.   Gastrointestinal: Positive for abdominal distention, abdominal pain, diarrhea and nausea. Negative for blood in stool and vomiting.   Genitourinary: Positive for flank pain. Negative for decreased urine volume, difficulty urinating, frequency and urgency.   All other systems reviewed and are negative.    Allergies:  Ampicillin  Aspirin  Chantix  Ciprofloxacin  Penicillins  Tessalon     Medications:  Tessalon  Pepcid  Zofran  Roxicodone    Past Medical History:    IBS  Hyperlipidemia  Gingivitis  Hypertension  Depression  Fatty Liver Disease (nonalcoholic)  Chronic abdominal pain  Neuralgia or abdomen  Bipolar  Disorder  Nicotine dependence  COPD  Asthma      Past Surgical History:    Cholecystectomy  Hysterectomy with bilateral salpingoophorectomy  Tubal Ligation   Right foot surgery      Family History:    Brother: Coronary Stenting, heart disease  Child: Diabetes  Father: aneurysm  Mother: Diabetes  Sister: Diabetes     Social History:  Presents alone  No hx of alcohol use   Former smoker    Physical Exam     Patient Vitals for the past 24 hrs:   BP Temp Pulse Resp SpO2   03/01/21 2115 (!) 151/84 -- (!) 44 -- 100 %   03/01/21 2100 135/68 -- (!) 46 -- 94 %   03/01/21 2045 (!) 140/72 -- 51 -- 98 %   03/01/21 2000 (!) 153/81 -- (!) 46 -- 98 %   03/01/21 1915 (!) 141/77 -- (!) 46 -- 99 %   03/01/21 1900 (!) 143/66 -- (!) 45 -- 99 %   03/01/21 1704 (!) 160/108 97.1  F (36.2  C) 52 18 100 %       Physical Exam  Constitutional: Pleasant. Cooperative.   Eyes: Pupils equally round and reactive  HENT: Head is normal in appearance. Oropharynx is normal with moist mucus membranes.  Cardiovascular: Regular rate and rhythm and without murmurs.  Respiratory: Normal respiratory effort, lungs are clear bilaterally.  GI: RUQ TTP, otherwise non-tender, soft, non-distended. No guarding, rebound, or rigidity.  Musculoskeletal: No asymmetry of the lower extremities, no tenderness to palpation. No CVA TTP.  Skin: Normal, without rash.  Neurologic: Cranial nerves grossly intact, normal cognition, no focal deficits. Alert and oriented x 3.   Psychiatric: Normal affect.  Nursing notes and vital signs reviewed.    Emergency Department Course   ECG  ECG taken at 1930, ECG read at 1933  Narked sinus bradycardia. Minimal voltage criteria for LVH, my be normal variant. Abnormal ECG.   No significant change as compared to prior, dated 1/19/21.  Rate 42 bpm. NC interval 176 ms. QRS duration 112 ms. QT/QTc 466/389 ms. P-R-T axes 38 -8 23.     Imaging:  CT Abdomen Pelvis with IV Contrast:  1. No acute abnormality in the abdomen or pelvis.  2. Moderate  hepatomegaly, diffuse steatosis.  As per radiology.    Laboratory:  Lipase: 72 (L)  (1712) Troponin: <0.015  CBC: WBC 5.2, HGB 13.3,   CMP: Glucose 110 (H), Urea Nitrogen 6 (L), o/w WNL (Creatinine: 0.62)    UA Microscopic Reflex to Culture: Blood trace (A), Bacteria moderate (A), Mucous present (A), o/w WNL    Emergency Department Course:    Reviewed:  I reviewed nursing notes, vitals, past medical history and care everywhere    Assessments:  1836 I obtained history and examined the patient as noted above.    I rechecked the patient and explained findings.     Interventions:  Medications   ondansetron (ZOFRAN) injection 4 mg (4 mg Intravenous Given 3/1/21 2023)   HYDROmorphone (PF) (DILAUDID) injection 0.5 mg (0.5 mg Intravenous Given 3/1/21 2023)   iopamidol (ISOVUE-370) solution 500 mL (100 mLs Intravenous Given 3/1/21 2029)   for CT scan flush use (65 mLs Intravenous Given 3/1/21 2030)     Disposition:  The patient was discharged to home.       Impression & Plan     Medical Decision Making:  Adriane Garrett is a 45 year old female who presents to the ED for evaluation of RUQ abdominal pain.  Patient is status post cholecystectomy.  Chart review reveals patient has had handful of similar presentations in the past with identical pain with negative work-ups.  Per care everywhere, patient also has history of chronic abdominal pain.  See HPI as above for additional details.  Vitals and physical exam as above.  Differential was broad and included pancreatitis, hepatitis, atypical ACS, GERD, shingles, among others.  Work-up as above.  Discussed with patient unclear etiology of her symptoms at this point in time.  No evidence for hepatitis or pancreatitis based upon work-up as above.  CT of the abdomen/pelvis without evidence for acute abnormality.  Advise close follow-up with PCP with persistent pain.  Short course of oxycodone for home.  Discussed narcotic precautions.  Discussed reasons to return. All questions  answered. Patient discharged to home in stable condition.    Diagnosis:    ICD-10-CM    1. RUQ abdominal pain  R10.11    2. Nausea  R11.0        Discharge Medications:  Oxycodone  Zofran    Scribe Disclosure:  I, Ольга Jj, am serving as a scribe at 7:16 PM on 3/1/2021 to document services personally performed by Gerardo Salas PA-C based on my observations and the provider's statements to me.     This record was created at least in part using electronic voice recognition software, so please excuse any typographical errors.             Gerardo Salas PA-C  03/01/21 1090

## 2021-04-24 ENCOUNTER — HOSPITAL ENCOUNTER (EMERGENCY)
Facility: CLINIC | Age: 46
Discharge: HOME | End: 2021-04-24
Attending: NURSE PRACTITIONER | Admitting: NURSE PRACTITIONER

## 2021-04-24 ENCOUNTER — APPOINTMENT (OUTPATIENT)
Dept: CT IMAGING | Facility: CLINIC | Age: 46
End: 2021-04-24
Attending: NURSE PRACTITIONER

## 2021-04-24 VITALS
DIASTOLIC BLOOD PRESSURE: 75 MMHG | RESPIRATION RATE: 20 BRPM | OXYGEN SATURATION: 100 % | HEART RATE: 50 BPM | SYSTOLIC BLOOD PRESSURE: 134 MMHG | TEMPERATURE: 97.5 F

## 2021-04-24 DIAGNOSIS — K21.9 GERD (GASTROESOPHAGEAL REFLUX DISEASE): ICD-10-CM

## 2021-04-24 DIAGNOSIS — J45.20 ASTHMA, MILD INTERMITTENT: ICD-10-CM

## 2021-04-24 DIAGNOSIS — R10.31 ABDOMINAL PAIN, RIGHT LOWER QUADRANT: ICD-10-CM

## 2021-04-24 PROBLEM — K58.9 IRRITABLE BOWEL SYNDROME: Status: ACTIVE | Noted: 2019-06-26

## 2021-04-24 PROBLEM — I10 HYPERTENSION: Status: ACTIVE | Noted: 2021-04-24

## 2021-04-24 PROBLEM — K76.89 OTHER CHRONIC NONALCOHOLIC LIVER DISEASE: Status: ACTIVE | Noted: 2021-04-24

## 2021-04-24 PROBLEM — E66.01 MORBID OBESITY (H): Status: ACTIVE | Noted: 2021-04-24

## 2021-04-24 PROBLEM — E55.9 VITAMIN D DEFICIENCY: Status: ACTIVE | Noted: 2021-04-24

## 2021-04-24 PROBLEM — E78.5 HYPERLIPIDEMIA: Status: ACTIVE | Noted: 2021-04-24

## 2021-04-24 PROBLEM — K85.90 PANCREATITIS: Status: ACTIVE | Noted: 2018-12-12

## 2021-04-24 PROBLEM — G43.909 MIGRAINE HEADACHE: Status: ACTIVE | Noted: 2021-04-24

## 2021-04-24 PROBLEM — R10.13 EPIGASTRIC PAIN: Status: ACTIVE | Noted: 2019-04-01

## 2021-04-24 LAB
ALBUMIN SERPL-MCNC: 3.7 G/DL (ref 3.4–5)
ALBUMIN UR-MCNC: NEGATIVE MG/DL
ALP SERPL-CCNC: 93 U/L (ref 40–150)
ALT SERPL W P-5'-P-CCNC: 68 U/L (ref 0–50)
ANION GAP SERPL CALCULATED.3IONS-SCNC: 4 MMOL/L (ref 3–14)
APPEARANCE UR: CLEAR
AST SERPL W P-5'-P-CCNC: 37 U/L (ref 0–45)
BACTERIA #/AREA URNS HPF: ABNORMAL /HPF
BASOPHILS # BLD AUTO: 0 10E9/L (ref 0–0.2)
BASOPHILS NFR BLD AUTO: 0.3 %
BILIRUB SERPL-MCNC: 0.2 MG/DL (ref 0.2–1.3)
BILIRUB UR QL STRIP: NEGATIVE
BUN SERPL-MCNC: 8 MG/DL (ref 7–30)
CALCIUM SERPL-MCNC: 8.7 MG/DL (ref 8.5–10.1)
CHLORIDE SERPL-SCNC: 107 MMOL/L (ref 94–109)
CO2 SERPL-SCNC: 28 MMOL/L (ref 20–32)
COLOR UR AUTO: ABNORMAL
CREAT SERPL-MCNC: 0.64 MG/DL (ref 0.52–1.04)
DIFFERENTIAL METHOD BLD: NORMAL
EOSINOPHIL # BLD AUTO: 0.1 10E9/L (ref 0–0.7)
EOSINOPHIL NFR BLD AUTO: 1.4 %
ERYTHROCYTE [DISTWIDTH] IN BLOOD BY AUTOMATED COUNT: 13.6 % (ref 10–15)
GFR SERPL CREATININE-BSD FRML MDRD: >90 ML/MIN/{1.73_M2}
GLUCOSE SERPL-MCNC: 112 MG/DL (ref 70–99)
GLUCOSE UR STRIP-MCNC: NEGATIVE MG/DL
HCT VFR BLD AUTO: 40.8 % (ref 35–47)
HGB BLD-MCNC: 13 G/DL (ref 11.7–15.7)
HGB UR QL STRIP: NEGATIVE
IMM GRANULOCYTES # BLD: 0 10E9/L (ref 0–0.4)
IMM GRANULOCYTES NFR BLD: 0.3 %
KETONES UR STRIP-MCNC: NEGATIVE MG/DL
LEUKOCYTE ESTERASE UR QL STRIP: NEGATIVE
LIPASE SERPL-CCNC: 71 U/L (ref 73–393)
LYMPHOCYTES # BLD AUTO: 3 10E9/L (ref 0.8–5.3)
LYMPHOCYTES NFR BLD AUTO: 48.3 %
MCH RBC QN AUTO: 30.4 PG (ref 26.5–33)
MCHC RBC AUTO-ENTMCNC: 31.9 G/DL (ref 31.5–36.5)
MCV RBC AUTO: 95 FL (ref 78–100)
MONOCYTES # BLD AUTO: 0.4 10E9/L (ref 0–1.3)
MONOCYTES NFR BLD AUTO: 6.3 %
MUCOUS THREADS #/AREA URNS LPF: PRESENT /LPF
NEUTROPHILS # BLD AUTO: 2.7 10E9/L (ref 1.6–8.3)
NEUTROPHILS NFR BLD AUTO: 43.4 %
NITRATE UR QL: NEGATIVE
NRBC # BLD AUTO: 0 10*3/UL
NRBC BLD AUTO-RTO: 0 /100
PH UR STRIP: 5.5 PH (ref 5–7)
PLATELET # BLD AUTO: 344 10E9/L (ref 150–450)
POTASSIUM SERPL-SCNC: 3.7 MMOL/L (ref 3.4–5.3)
PROT SERPL-MCNC: 7.4 G/DL (ref 6.8–8.8)
RBC # BLD AUTO: 4.28 10E12/L (ref 3.8–5.2)
RBC #/AREA URNS AUTO: <1 /HPF (ref 0–2)
SODIUM SERPL-SCNC: 139 MMOL/L (ref 133–144)
SOURCE: ABNORMAL
SP GR UR STRIP: 1.01 (ref 1–1.03)
SQUAMOUS #/AREA URNS AUTO: <1 /HPF (ref 0–1)
UROBILINOGEN UR STRIP-MCNC: NORMAL MG/DL (ref 0–2)
WBC # BLD AUTO: 6.3 10E9/L (ref 4–11)
WBC #/AREA URNS AUTO: 1 /HPF (ref 0–5)

## 2021-04-24 PROCEDURE — 250N000011 HC RX IP 250 OP 636: Performed by: NURSE PRACTITIONER

## 2021-04-24 PROCEDURE — 250N000009 HC RX 250: Performed by: NURSE PRACTITIONER

## 2021-04-24 PROCEDURE — 96374 THER/PROPH/DIAG INJ IV PUSH: CPT | Mod: 59

## 2021-04-24 PROCEDURE — 85025 COMPLETE CBC W/AUTO DIFF WBC: CPT | Performed by: EMERGENCY MEDICINE

## 2021-04-24 PROCEDURE — 80053 COMPREHEN METABOLIC PANEL: CPT | Performed by: EMERGENCY MEDICINE

## 2021-04-24 PROCEDURE — 74177 CT ABD & PELVIS W/CONTRAST: CPT

## 2021-04-24 PROCEDURE — 83690 ASSAY OF LIPASE: CPT | Performed by: EMERGENCY MEDICINE

## 2021-04-24 PROCEDURE — 99285 EMERGENCY DEPT VISIT HI MDM: CPT | Mod: 25

## 2021-04-24 PROCEDURE — 81001 URINALYSIS AUTO W/SCOPE: CPT | Performed by: EMERGENCY MEDICINE

## 2021-04-24 RX ORDER — HYDROMORPHONE HYDROCHLORIDE 1 MG/ML
0.5 INJECTION, SOLUTION INTRAMUSCULAR; INTRAVENOUS; SUBCUTANEOUS ONCE
Status: COMPLETED | OUTPATIENT
Start: 2021-04-24 | End: 2021-04-24

## 2021-04-24 RX ORDER — IOPAMIDOL 755 MG/ML
500 INJECTION, SOLUTION INTRAVASCULAR ONCE
Status: COMPLETED | OUTPATIENT
Start: 2021-04-24 | End: 2021-04-24

## 2021-04-24 RX ADMIN — SODIUM CHLORIDE 65 ML: 9 INJECTION, SOLUTION INTRAVENOUS at 20:24

## 2021-04-24 RX ADMIN — IOPAMIDOL 100 ML: 755 INJECTION, SOLUTION INTRAVENOUS at 20:24

## 2021-04-24 RX ADMIN — HYDROMORPHONE HYDROCHLORIDE 0.5 MG: 1 INJECTION, SOLUTION INTRAMUSCULAR; INTRAVENOUS; SUBCUTANEOUS at 20:47

## 2021-04-24 ASSESSMENT — ENCOUNTER SYMPTOMS
VOMITING: 0
NAUSEA: 1
ABDOMINAL PAIN: 1
HEMATURIA: 0
FEVER: 0
DYSURIA: 0
CHILLS: 0
DIFFICULTY URINATING: 0
FREQUENCY: 0

## 2021-04-24 NOTE — ED TRIAGE NOTES
Pt arrives for lower abdominal pain that started on Thursday. Pt endorses some nausea but denies vomiting. Pt also reports diarrhea but has a history of IBS. ABCs intact.

## 2021-04-25 NOTE — ED PROVIDER NOTES
History   Chief Complaint:  Abdominal Pain and Nausea       The history is provided by the patient.      Adriane Garrett is a 45 year old female with history of IBS who presents with constant non-radiating lower abdominal pain that started 2 days ago. She notes a history of cholecystectomy, tubal ligation, and hysterectomy. She denies any urinary symptoms.     Review of Systems   Constitutional: Negative for chills and fever.   Cardiovascular: Negative for chest pain.   Gastrointestinal: Positive for abdominal pain and nausea. Negative for vomiting.   Genitourinary: Negative for decreased urine volume, difficulty urinating, dysuria, frequency, hematuria and urgency.   All other systems reviewed and are negative.    Allergies:  Ampicillin  Aspirin  Chantix [Varenicline]  Ciprofloxacin  Penicillins  Ranitidine  Tessalon [Benzonatate]    Medications:  Pepcid  Zofran  Roxicodone    Past Medical History:    IBS  GERD  Asthma  Manic depression  Bipolar disorder  Homelessness  Ovarian cyst  Hypertension  CAP  Endometriosis  Sialoadenitis  Hypomagnesemia  Furuncle   Nicotine dependence  Morbid obesity  COPD  Neuralgia of abdomen    Past Surgical History:    Tubal ligation  Cholecystectomy  Hysterectomy  Subtalar arthroereisis  Myringotomy w/ tube placement  EGD with biopsy    Family History:    Asthma  Stroke  Diabetes    Social History:  Patient presents to the ED alone.    Physical Exam     Patient Vitals for the past 24 hrs:   BP Temp Temp src Pulse Resp SpO2   04/24/21 2048 (!) 196/93 -- -- 50 20 99 %   04/24/21 1754 (!) 154/82 97.5  F (36.4  C) Temporal 54 18 96 %       Physical Exam  General: Alert, No obvious discomfort, well kept, morbidly obese  Eyes: PERRL, conjunctivae pink no scleral icterus or conjunctival injection  ENT:   Moist mucus membranes, posterior oropharynx clear without erythema or exudates, No lymphadenopathy, Normal voice  Resp:  Lungs clear to auscultation bilaterally, no crackles/rubs/wheezes.  Good air movement  CV:  Normal rate and rhythm, no murmurs/rubs/gallops  GI:  Abdomen soft and non-distended.  Normoactive BS.  No tenderness, guarding or rebound, No masses  Skin:  Warm, dry.  No rashes or petechiae  Musculoskeletal: No peripheral edema or calf tenderness, Normal gross ROM   Neuro: Alert and oriented to person/place/time, normal sensation  Psychiatric: Normal affect, cooperative, good eye contact      Emergency Department Course     Imaging:  CT Abdomen/Pelvis with IV contrast:   1.  No acute inflammatory process in the abdomen or pelvis. Normal appendix.  2.  Hepatic steatosis.   As per radiology.    Laboratory:  CBC: WBC: 6.3, HGB: 13.0, PLT: 344    CMP: Glucose 112 (H), ALT: 68 (H), o/w WNL (Creatinine: 0.64)    Lipase: 71 (L)    UA: Bacteria: Few (A), Mucous: Present, o/w Negative    Emergency Department Course:    Reviewed:  I reviewed nursing notes, vitals, past medical history and care everywhere    Assessments:  2001 I obtained history and examined the patient as noted above.     2106 I rechecked the patient and explained findings.     Interventions:  2047 Dilaudid 0.5 mg IV    Disposition:  The patient was discharged to home.     Impression & Plan     Medical Decision Making:  Adriane Garrett presents with abdominal pain.  The differential diagnosis is broad and includes:  Appendicitis, cholecystitis, peptic ulcer disease, diverticulitis, bowel obstruction, ischemia, pancreatitis, amongst others.  Based on clinical exam, laboratory testing, and imaging, no significant etiologies were found.  The pain has improved with interventions in the ED.  The exact etiology of the pain is not clear at this time.  She does have a history of irritable bowel syndrome and has had multiple visits to the emergency department for abdominal symptoms.  Likely to be in her chronic symptoms.  She will be discharged, and was warned that persistent or worsening symptoms should prompt re-examination (ED if  necessary) in 8-12 hours.    Diagnosis:    ICD-10-CM    1. Abdominal pain, right lower quadrant  R10.31 Primary Care Referral   2. Asthma, mild intermittent  J45.20 Primary Care Referral   3. GERD (gastroesophageal reflux disease)  K21.9 Primary Care Referral       Scribe Disclosure:  I, Bobby Keane, am serving as a scribe at 8:02 PM on 4/24/2021 to document services personally performed by Roderick Dave APRN based on my observations and the provider's statements to me.              Roderick Dave APRN CNP  04/24/21 2230

## 2021-05-07 ENCOUNTER — HOSPITAL ENCOUNTER (EMERGENCY)
Facility: CLINIC | Age: 46
Discharge: HOME | End: 2021-05-07
Attending: EMERGENCY MEDICINE | Admitting: EMERGENCY MEDICINE

## 2021-05-07 VITALS
TEMPERATURE: 98.1 F | DIASTOLIC BLOOD PRESSURE: 62 MMHG | BODY MASS INDEX: 44.92 KG/M2 | OXYGEN SATURATION: 95 % | WEIGHT: 230 LBS | HEART RATE: 60 BPM | RESPIRATION RATE: 16 BRPM | SYSTOLIC BLOOD PRESSURE: 133 MMHG

## 2021-05-07 DIAGNOSIS — R51.9 ACUTE NONINTRACTABLE HEADACHE, UNSPECIFIED HEADACHE TYPE: ICD-10-CM

## 2021-05-07 PROCEDURE — 96361 HYDRATE IV INFUSION ADD-ON: CPT

## 2021-05-07 PROCEDURE — 96376 TX/PRO/DX INJ SAME DRUG ADON: CPT

## 2021-05-07 PROCEDURE — 96375 TX/PRO/DX INJ NEW DRUG ADDON: CPT

## 2021-05-07 PROCEDURE — 258N000003 HC RX IP 258 OP 636: Performed by: EMERGENCY MEDICINE

## 2021-05-07 PROCEDURE — 96374 THER/PROPH/DIAG INJ IV PUSH: CPT

## 2021-05-07 PROCEDURE — 99284 EMERGENCY DEPT VISIT MOD MDM: CPT | Mod: 25

## 2021-05-07 PROCEDURE — 250N000011 HC RX IP 250 OP 636: Performed by: EMERGENCY MEDICINE

## 2021-05-07 RX ORDER — DIPHENHYDRAMINE HYDROCHLORIDE 50 MG/ML
25 INJECTION INTRAMUSCULAR; INTRAVENOUS ONCE
Status: COMPLETED | OUTPATIENT
Start: 2021-05-07 | End: 2021-05-07

## 2021-05-07 RX ORDER — DEXAMETHASONE SODIUM PHOSPHATE 10 MG/ML
15 INJECTION, SOLUTION INTRAMUSCULAR; INTRAVENOUS ONCE
Status: COMPLETED | OUTPATIENT
Start: 2021-05-07 | End: 2021-05-07

## 2021-05-07 RX ORDER — METOCLOPRAMIDE 10 MG/1
10 TABLET ORAL 4 TIMES DAILY PRN
Qty: 15 TABLET | Refills: 0 | Status: SHIPPED | OUTPATIENT
Start: 2021-05-07 | End: 2021-08-10

## 2021-05-07 RX ORDER — METOCLOPRAMIDE HYDROCHLORIDE 5 MG/ML
10 INJECTION INTRAMUSCULAR; INTRAVENOUS ONCE
Status: COMPLETED | OUTPATIENT
Start: 2021-05-07 | End: 2021-05-07

## 2021-05-07 RX ADMIN — DEXAMETHASONE SODIUM PHOSPHATE 15 MG: 10 INJECTION, SOLUTION INTRAMUSCULAR; INTRAVENOUS at 20:07

## 2021-05-07 RX ADMIN — METOCLOPRAMIDE HYDROCHLORIDE 10 MG: 5 INJECTION INTRAMUSCULAR; INTRAVENOUS at 20:57

## 2021-05-07 RX ADMIN — DIPHENHYDRAMINE HYDROCHLORIDE 25 MG: 50 INJECTION INTRAMUSCULAR; INTRAVENOUS at 20:57

## 2021-05-07 RX ADMIN — PROCHLORPERAZINE EDISYLATE 10 MG: 5 INJECTION INTRAMUSCULAR; INTRAVENOUS at 20:07

## 2021-05-07 RX ADMIN — DIPHENHYDRAMINE HYDROCHLORIDE 25 MG: 50 INJECTION, SOLUTION INTRAMUSCULAR; INTRAVENOUS at 20:07

## 2021-05-07 RX ADMIN — SODIUM CHLORIDE 1000 ML: 9 INJECTION, SOLUTION INTRAVENOUS at 20:06

## 2021-05-07 ASSESSMENT — ENCOUNTER SYMPTOMS
NAUSEA: 1
WEAKNESS: 0
FEVER: 0
NUMBNESS: 0
VOMITING: 0
HEADACHES: 1

## 2021-05-07 NOTE — ED TRIAGE NOTES
Pt here with c/o HA since Wednesday. Denies relief from ibup and benadryl. C/o nausea, no vomiting. No visual changes. Negative [place]. ABC intact. A&O x4.

## 2021-05-08 NOTE — ED PROVIDER NOTES
History   Chief Complaint:  Headache       HPI   Adriane Garrett is a 45 year old female with history of migraines, hypertension, and hyperlipidemia who presents with headache. The patient states that almost two days ago she was sitting watching TV when she had pain in the back of her head, and the pain has been gradually worsening since. She says nothing makes the pain better or worse. She also has been experiencing nausea. She says she has tried ibuprofen, Benadryl, and an unknown migraine medication to no relief. She does note a family history of brain aneurysm and is concerned for that. She did present to the ED a few months ago for a headache, but she says her current headache is different from that presentation. The patient denies fever, vomiting, or numbness or weakness.    CTA Head Neck with Contrast 09/28/2020:  HEAD CT:  1.  Normal for age.  2.  No CT finding of a mass, infarct or hemorrhage.  HEAD CTA:   1.  No branch vessel occlusion, high-grade stenosis, aneurysm, or high flow vascular malformation involving proximal [place] of Fournier vessels.  NECK CTA:  1.  No significant stenosis in the neck vessels based on NASCET criteria.  2.  No evidence for dissection.  Reading per radiology      Review of Systems   Constitutional: Negative for fever.   Gastrointestinal: Positive for nausea. Negative for vomiting.   Neurological: Positive for headaches. Negative for weakness and numbness.   All other systems reviewed and are negative.      Allergies:  Ampicillin  Aspirin  Chantix [Varenicline]  Ciprofloxacin  Penicillins  Tessalon [Benzonatate]      Medications:  The patient denies any regular medications.      Past Medical History:    Irritable bowel syndrome  Nonalcoholic liver disease  Migraine  Hypertension  Hyperlipidemia  Pancreatitis  Bipolar 1 disorder  Asthma  GERD  Ovarian cyst  TMJ pain  Endometriosis  Sialoadenitis  Sebaceous cyst  Furuncle  Shingles      Past Surgical History:     Cholecystectomy  Hysterectomy and tubal ligation  Subtalar arthroereisis  Laparoscopy abdomen diagnostic  Myringotomy with tube placement, bilateral  EGD  Coronary stenting  Aneurysm       Family History:    Asthma  Stroke  Diabetes      Social History:   drove her to the ED.  Arrives from home.    Physical Exam     Patient Vitals for the past 24 hrs:   BP Temp Temp src Pulse Resp SpO2 Weight   05/07/21 2030 133/62 -- -- 60 -- 95 % --   05/07/21 2015 (!) 147/53 -- -- 53 -- 96 % --   05/07/21 1754 (!) 166/98 -- -- -- -- -- --   05/07/21 1753 -- 98.1  F (36.7  C) Temporal 60 16 96 % 104.3 kg (230 lb)       Physical Exam    HEENT:   Temporal arteries are non-tender.      Oropharynx is moist, without lesions or trismus.  Eyes:   PERRL.  EOMs intact.      No corneal clouding.   NECK:   Supple, no meningismus.       Negative Brudzinski's sign.  CV:    Regular rate and rhythm.    No murmurs, rubs or gallops.  PULM:   Clear to auscultation bilateral.      No respiratory distress.      No stridor or wheezing.  ABD:  Soft, non-tender, non-distended.      No rebound or guarding.  MSK:    No gross deformity to all four extremities.      No significant joint effusions.  LYMPH:  No cervical lymphadenopathy.  NEURO:  A & O x 3    CN II-XII intact, speech is clear with no aphasia.      Finger to nose within normal limits.  No pronator drift.      Strength is 5/5 in all 4 extremities.  Sensation is intact.      Normal muscular tone, no tremor.  SKIN:   Warm, dry and intact.    PSYCH:   Mood is good and affect is appropriate.      Emergency Department Course     Emergency Department Course:    Reviewed:  I reviewed nursing notes, vitals, past medical history and care everywhere    Assessments:  1920 I obtained history and examined the patient as noted above.   2126 I rechecked the patient.     Interventions:  2006 NS 1000 mL IV  2007 Compazine 10 mg IV  2007 Benadryl 25 mg IV  2007 Decadron 15 mg IV  2057 Reglan 10 mg  IV   Benadryl 25 mg IV    Disposition:  The patient was discharged to home.     Impression & Plan   Medical Decision Makin-year-old female presents with the progressive onset of headache.  No features concerning for intracranial hemorrhage, mass, dural sinus thrombosis, temporal arteritis, glaucoma.  She was concerned about the possibility of ruptured subarachnoid hemorrhage given a family history but she had a head/neck CTA in 2020 which was unremarkable.  No indication for repeat radiographic studies.  Symptoms remarkably improved with interventions as described above and is most consistent with migraine headache.  She will be discharged home with Reglan as needed and return to the ED for any worsening symptoms.        Diagnosis:    ICD-10-CM    1. Acute nonintractable headache, unspecified headache type  R51.9        Discharge Medications:  New Prescriptions    METOCLOPRAMIDE (REGLAN) 10 MG TABLET    Take 1 tablet (10 mg) by mouth 4 times daily as needed (headache or nausea)       Scribe Disclosure:  Deja CHEUNG, am serving as a scribe at 7:26 PM on 2021 to document services personally performed by French Harmon MD based on my observations and the provider's statements to me.      French Harmon MD  21 2098

## 2021-05-22 ENCOUNTER — APPOINTMENT (OUTPATIENT)
Dept: GENERAL RADIOLOGY | Facility: CLINIC | Age: 46
End: 2021-05-22
Attending: EMERGENCY MEDICINE

## 2021-05-22 ENCOUNTER — HOSPITAL ENCOUNTER (EMERGENCY)
Facility: CLINIC | Age: 46
Discharge: HOME | End: 2021-05-22
Attending: EMERGENCY MEDICINE | Admitting: EMERGENCY MEDICINE

## 2021-05-22 VITALS
SYSTOLIC BLOOD PRESSURE: 147 MMHG | RESPIRATION RATE: 19 BRPM | HEART RATE: 52 BPM | DIASTOLIC BLOOD PRESSURE: 76 MMHG | TEMPERATURE: 96.9 F | OXYGEN SATURATION: 98 %

## 2021-05-22 DIAGNOSIS — R07.89 CHEST WALL PAIN: ICD-10-CM

## 2021-05-22 DIAGNOSIS — M94.0 COSTOCHONDRITIS: ICD-10-CM

## 2021-05-22 LAB
ANION GAP SERPL CALCULATED.3IONS-SCNC: 8 MMOL/L (ref 3–14)
BASOPHILS # BLD AUTO: 0 10E9/L (ref 0–0.2)
BASOPHILS NFR BLD AUTO: 0.3 %
BUN SERPL-MCNC: 10 MG/DL (ref 7–30)
CALCIUM SERPL-MCNC: 9 MG/DL (ref 8.5–10.1)
CHLORIDE SERPL-SCNC: 109 MMOL/L (ref 94–109)
CO2 SERPL-SCNC: 25 MMOL/L (ref 20–32)
CREAT SERPL-MCNC: 0.57 MG/DL (ref 0.52–1.04)
D DIMER PPP FEU-MCNC: 0.3 UG/ML FEU (ref 0–0.5)
DIFFERENTIAL METHOD BLD: NORMAL
EOSINOPHIL # BLD AUTO: 0.1 10E9/L (ref 0–0.7)
EOSINOPHIL NFR BLD AUTO: 0.9 %
ERYTHROCYTE [DISTWIDTH] IN BLOOD BY AUTOMATED COUNT: 13.5 % (ref 10–15)
GFR SERPL CREATININE-BSD FRML MDRD: >90 ML/MIN/{1.73_M2}
GLUCOSE SERPL-MCNC: 149 MG/DL (ref 70–99)
HCT VFR BLD AUTO: 40.8 % (ref 35–47)
HGB BLD-MCNC: 13 G/DL (ref 11.7–15.7)
IMM GRANULOCYTES # BLD: 0 10E9/L (ref 0–0.4)
IMM GRANULOCYTES NFR BLD: 0.3 %
LYMPHOCYTES # BLD AUTO: 2.5 10E9/L (ref 0.8–5.3)
LYMPHOCYTES NFR BLD AUTO: 38.1 %
MCH RBC QN AUTO: 29.3 PG (ref 26.5–33)
MCHC RBC AUTO-ENTMCNC: 31.9 G/DL (ref 31.5–36.5)
MCV RBC AUTO: 92 FL (ref 78–100)
MONOCYTES # BLD AUTO: 0.4 10E9/L (ref 0–1.3)
MONOCYTES NFR BLD AUTO: 6.5 %
NEUTROPHILS # BLD AUTO: 3.5 10E9/L (ref 1.6–8.3)
NEUTROPHILS NFR BLD AUTO: 53.9 %
NRBC # BLD AUTO: 0 10*3/UL
NRBC BLD AUTO-RTO: 0 /100
PLATELET # BLD AUTO: 363 10E9/L (ref 150–450)
POTASSIUM SERPL-SCNC: 3.6 MMOL/L (ref 3.4–5.3)
RBC # BLD AUTO: 4.44 10E12/L (ref 3.8–5.2)
SODIUM SERPL-SCNC: 142 MMOL/L (ref 133–144)
TROPONIN I SERPL-MCNC: <0.015 UG/L (ref 0–0.04)
WBC # BLD AUTO: 6.5 10E9/L (ref 4–11)

## 2021-05-22 PROCEDURE — 80048 BASIC METABOLIC PNL TOTAL CA: CPT | Performed by: EMERGENCY MEDICINE

## 2021-05-22 PROCEDURE — 84484 ASSAY OF TROPONIN QUANT: CPT | Performed by: EMERGENCY MEDICINE

## 2021-05-22 PROCEDURE — 85379 FIBRIN DEGRADATION QUANT: CPT | Performed by: EMERGENCY MEDICINE

## 2021-05-22 PROCEDURE — 250N000013 HC RX MED GY IP 250 OP 250 PS 637: Performed by: EMERGENCY MEDICINE

## 2021-05-22 PROCEDURE — 93005 ELECTROCARDIOGRAM TRACING: CPT

## 2021-05-22 PROCEDURE — 99285 EMERGENCY DEPT VISIT HI MDM: CPT | Mod: 25

## 2021-05-22 PROCEDURE — 85025 COMPLETE CBC W/AUTO DIFF WBC: CPT | Performed by: EMERGENCY MEDICINE

## 2021-05-22 PROCEDURE — 71046 X-RAY EXAM CHEST 2 VIEWS: CPT

## 2021-05-22 RX ORDER — OXYCODONE HYDROCHLORIDE 5 MG/1
5 TABLET ORAL ONCE
Status: COMPLETED | OUTPATIENT
Start: 2021-05-22 | End: 2021-05-22

## 2021-05-22 RX ORDER — ACETAMINOPHEN 325 MG/1
975 TABLET ORAL ONCE
Status: COMPLETED | OUTPATIENT
Start: 2021-05-22 | End: 2021-05-22

## 2021-05-22 RX ADMIN — ACETAMINOPHEN 975 MG: 325 TABLET, FILM COATED ORAL at 17:11

## 2021-05-22 RX ADMIN — OXYCODONE HYDROCHLORIDE 5 MG: 5 TABLET ORAL at 18:48

## 2021-05-22 ASSESSMENT — ENCOUNTER SYMPTOMS
SHORTNESS OF BREATH: 1
COUGH: 0

## 2021-05-22 NOTE — ED TRIAGE NOTES
Chest pain on and off since 0900 on 5/21.  But for the past hour the pain has also radiated up to her jaw.  Pt did take tylenol PM last night with no relief.  Nothing makes the pain worse, nothing helps.

## 2021-05-22 NOTE — ED PROVIDER NOTES
History   Chief Complaint  Chest Pain    HPI  Adriane Garrett is a 45 year old female with a history of asthma, hypertension, hyperlipidemia, and GERD  who presents for evaluation of left sided chest pain that radiates into her jaw. The pain started yesterday at 0900. It has gotten more constant and severe in the past few hours, prompting her visit to the ED. Here, she endorses some shortness of breath and notes that the pain is worse with taking a deep breath. The pain is not exacerbated by walking. She has had chest pain with her bronchitis/asthma in the past however that is normally associated with a cough. She does not have a cough at this time. Denies any leg swelling. Of note, she does report a 5 hour car ride last weekend.     Review of Systems   Respiratory: Positive for shortness of breath. Negative for cough.    Cardiovascular: Positive for chest pain. Negative for leg swelling.   All other systems reviewed and are negative.    Allergies  Ampicillin  Aspirin  Chantix   Ciprofloxacin  Penicillins  Tessalon     Medications  The patient is currently on no regular medications.    Past Medical History:    Irritable bowel syndrome  Nonalcoholic liver disease  Migraine  Hypertension  Hyperlipidemia  Pancreatitis  Bipolar 1 disorder  Asthma  GERD  Ovarian cyst  TMJ pain  Endometriosis  Sialoadenitis  Sebaceous cyst  Furuncle  Shingles     Past Surgical History:    Cholecystectomy  Hysterectomy and tubal ligation  Subtalar arthroereisis  Laparoscopy abdomen diagnostic  Myringotomy with tube placement, bilateral  EGD  Coronary stenting  Aneurysm     Family History:    Asthma  Stroke  Diabetes     Social History  Presents to the ED alone.   Tobacco use: E cig    Physical Exam     Patient Vitals for the past 24 hrs:   BP Temp Temp src Pulse Resp SpO2   05/22/21 1900 (!) 147/76 -- -- 52 19 98 %   05/22/21 1830 (!) 148/80 -- -- 52 18 96 %   05/22/21 1815 -- -- -- 60 26 98 %   05/22/21 1800 (!) 140/57 -- -- 59 11 98 %    05/22/21 1730 (!) 141/69 -- -- 59 26 97 %   05/22/21 1715 -- -- -- 63 26 97 %   05/22/21 1700 (!) 155/71 -- -- 58 -- 97 %   05/22/21 1605 (!) 178/90 96.9  F (36.1  C) Temporal 65 18 95 %     Physical Exam  Constitutional: Alert, attentive, GCS 15  HENT:    Nose: Nose normal.   Eyes: EOM are normal, anicteric, conjugate gaze  CV: regular rate and rhythm; no murmurs  Chest: Effort normal and breath sounds clear without wheezing or rales, symmetric bilaterally   GI:  non tender. No distension. No guarding or rebound.    MSK: No LE edema, no tenderness to palpation of BLE.  Neurological: Alert, attentive, moving all extremities equally.   Skin: Skin is warm and dry.    Emergency Department Course   ECG:   ECG taken at 1546, ECG read at 1547  Sinus bradycardia. Otherwise normal ECG.   No significant change as compared to prior, dated 3/7/2021.   Rate 56 bpm. AL interval 176 ms. QRS duration 106 ms. QT/QTc 420/405 ms. P-R-T axes 57 -1 48.    Imaging:  XR Chest 2 views:   No acute cardiac pulmonary process. No displaced rib fractures. As per radiology.     Laboratory:  CBC: WBC: 6.5, HGB: 13.0, PLT: 363  BMP: Glucose 149 (H), o/w WNL (Creatinine: 0.57)  1625 Troponin I: <0.015  D dimer quantitative: 0.3    Emergency Department Course:  Reviewed:  I reviewed nursing notes, vitals and past medical history    Assessments:  1722 I physically examined the patient as documented above.    2130 I rechecked the patient.     Interventions:  1711 Tylenol 975 mg PO  1848 Roxicodone 5 mg PO    Disposition:  The patient was discharged to home.     Impression & Plan   Medical Decision Making:  Adriane Garrett is a 45 year old female presenting for evaluation of left sided non cardiac chest pain, who was found on exam to have fully reproducible left chest wall tenderness just at the costochondral junction, suggestive of costochondritis. EKG shows no evidence of ischemia, troponin is negative and I have low suspicion of ACS. D dimer is  negative, chest xray is clear. I recommended conservative management with Tylenol and ice packs, she is allergic to NSAIDs.      Diagnosis:    ICD-10-CM    1. Chest wall pain  R07.89    2. Costochondritis  M94.0      Trung Morales MD  [employer]  11:31 PM 05/22/21     Scribe Disclosure:  I, Carlos Last, am serving as a scribe at 4:52 PM on 5/22/2021 to document services personally performed by Trung Morales MD based on my observations and the provider's statements to me.      Trung Morales MD  05/22/21 8882

## 2021-05-23 NOTE — DISCHARGE INSTRUCTIONS
I recommend ice to your chest as well as 1000 mg of Tylenol every 6 hours for your pain. There is no evidence of blood clot in your lungs, pneumonia, collapsed lung or evidence of cardiac damage. Given how tender you were this is inflammation irritation of the chest wall likely costochondritis. You should make an appointment follow-up with your primary doctor for recheck.

## 2021-05-24 LAB — INTERPRETATION ECG - MUSE: NORMAL

## 2021-05-30 VITALS — BODY MASS INDEX: 43.94 KG/M2 | HEIGHT: 63 IN | WEIGHT: 248 LBS

## 2021-05-30 VITALS — HEIGHT: 60 IN | WEIGHT: 245 LBS | BODY MASS INDEX: 48.1 KG/M2

## 2021-05-31 VITALS — HEIGHT: 63 IN | BODY MASS INDEX: 44.3 KG/M2 | WEIGHT: 250 LBS

## 2021-05-31 VITALS — WEIGHT: 243 LBS | HEIGHT: 60 IN | BODY MASS INDEX: 47.71 KG/M2

## 2021-05-31 VITALS — HEIGHT: 60 IN | WEIGHT: 239 LBS | BODY MASS INDEX: 46.92 KG/M2

## 2021-05-31 VITALS — WEIGHT: 249 LBS | BODY MASS INDEX: 44.12 KG/M2 | HEIGHT: 63 IN

## 2021-05-31 VITALS — BODY MASS INDEX: 47.32 KG/M2 | HEIGHT: 60 IN | WEIGHT: 241 LBS

## 2021-05-31 VITALS — HEIGHT: 63 IN | WEIGHT: 241 LBS | BODY MASS INDEX: 42.7 KG/M2

## 2021-05-31 VITALS — WEIGHT: 242 LBS | BODY MASS INDEX: 47.51 KG/M2 | HEIGHT: 60 IN

## 2021-05-31 VITALS — HEIGHT: 60 IN | BODY MASS INDEX: 47.41 KG/M2 | WEIGHT: 241.5 LBS

## 2021-05-31 VITALS — WEIGHT: 245 LBS | BODY MASS INDEX: 43.41 KG/M2 | HEIGHT: 63 IN

## 2021-06-01 ENCOUNTER — RECORDS - HEALTHEAST (OUTPATIENT)
Dept: ADMINISTRATIVE | Facility: CLINIC | Age: 46
End: 2021-06-01

## 2021-06-02 ENCOUNTER — RECORDS - HEALTHEAST (OUTPATIENT)
Dept: ADMINISTRATIVE | Facility: CLINIC | Age: 46
End: 2021-06-02

## 2021-06-05 ENCOUNTER — RECORDS - HEALTHEAST (OUTPATIENT)
Dept: ADMINISTRATIVE | Facility: CLINIC | Age: 46
End: 2021-06-05

## 2021-06-09 NOTE — PROGRESS NOTES
Office Visit - Follow Up   Adriane Garrett   41 y.o. female    Date of Visit: 2/28/2017    Chief Complaint   Patient presents with     Cough        Assessment and Plan   1. COPD exacerbation  Start prednisone, doxycycline, continue albuterol inhaler as needed as well as Symbicort.    2. Generalized abdominal pain  Doubt she has Crohn's disease, she wants to discuss with gastroenterology  - Ambulatory referral to Gastroenterology      The following high BMI interventions were performed this visit: encouragement to exercise and lifestyle education regarding diet    Return in about 4 weeks (around 3/28/2017) for recheck.     History of Present Illness   This 41 y.o. old woman comes in with cough and shortness of breath.  This has been going on for about 1-1/2 weeks.  No fever.  Cough is generally non-productive/productive of clear sputum.  Increased dyspnea with exertion.  Recent viral upper respiratory infection, improving.  No body aches, no fever.  Ongoing chronic abdominal pain, she wonders if she has Crohn's disease.  Previous colonoscopy did not demonstrate this all she reports they were unable to intubate the terminal ileum.    Review of Systems: A comprehensive review of systems was negative except as noted.     Medications, Allergies and Problem List   Reviewed and updated     Physical Exam   General Appearance:   No acute distress    Visit Vitals     /66 (Patient Site: Left Arm, Patient Position: Sitting, Cuff Size: Adult Regular)     Pulse (!) 56     Ht 5' (1.524 m)     Wt (!) 245 lb (111.1 kg)     SpO2 97%     BMI 47.85 kg/m2       HEENT exam is unremarkable, neck supple, no cervical lymphadenopathy, cardiovascular regular rate and rhythm no murmur gallop or rub, lungs are clear to auscultation bilaterally with the exception of bilateral extra wheezing heard anteriorly, abdomen soft nontender nondistended no organomegaly, neurologic exam is nonfocal, psychiatric pleasant, no confusion or agitation         Additional Information   Current Outpatient Prescriptions   Medication Sig Dispense Refill     acetaminophen (TYLENOL) 500 MG tablet Take 500-1,000 mg by mouth every 6 (six) hours as needed. Maximum of 3000 mg per 24 hours.       albuterol (PROAIR HFA) 90 mcg/actuation inhaler Inhale 1-2 puffs every 4 (four) hours as needed for wheezing. 1 Inhaler 6     budesonide-formoterol (SYMBICORT) 160-4.5 mcg/actuation inhaler Inhale 2 puffs 2 (two) times a day. 1 Inhaler 11     diphenhydrAMINE (BENADRYL) 25 mg capsule Take 50 mg by mouth bedtime as needed for allergies.       escitalopram oxalate (LEXAPRO) 10 MG tablet Take 0.5 tablets (5 mg total) by mouth daily. 30 tablet 2     ibuprofen (ADVIL,MOTRIN) 200 MG tablet Take 200-400 mg by mouth every 6 (six) hours as needed.       metoprolol tartrate (LOPRESSOR) 25 MG tablet Take 1 tablet (25 mg total) by mouth 2 (two) times a day. 60 tablet 3     codeine-guaiFENesin (GUAIFENESIN AC)  mg/5 mL liquid Take 5 mL by mouth 3 (three) times a day as needed for cough. 120 mL 0     doxycycline (ADOXA) 100 MG tablet Take 1 tablet (100 mg total) by mouth 2 (two) times a day. 14 tablet 0     predniSONE (DELTASONE) 10 MG tablet Take 40mg by mouth daily for 3 days, then 30mg x 3 days, then 20mg x 3 days ,then 10mg x 3 days then stop 30 tablet 0     No current facility-administered medications for this visit.      Allergies   Allergen Reactions     Ampicillin      Ciprofloxacin      Lamotrigine Other (See Comments)     Tongue and throat itching     Penicillins      Ranitidine Other (See Comments)     Pt states it makes her feel like she wants to commit suicide     Varenicline      Annotation: suicidal       Aspirin Rash     Social History   Substance Use Topics     Smoking status: Former Smoker     Packs/day: 1.00     Types: Cigarettes     Quit date: 2/16/2015     Smokeless tobacco: Never Used      Comment: uses e cigarette 4/7/15     Alcohol use No        Nicko Reyes MD

## 2021-06-10 NOTE — PROGRESS NOTES
"  Office Visit - Follow Up   Adriane Garrett   41 y.o. female    Date of Visit: 5/3/2017    Chief Complaint   Patient presents with     Cough     Sinus Problem        Assessment and Plan   1. Mucopurulent chronic bronchitis  Continue inhalers, short course of prednisone and doxycycline, suggested she meet with a pulmonologist we will discuss at her next visit, probably would benefit from pulmonary function testing  - albuterol (PROAIR HFA) 90 mcg/actuation inhaler; Inhale 1-2 puffs every 4 (four) hours as needed for wheezing.  Dispense: 1 Inhaler; Refill: 6    2. Acute non-recurrent maxillary sinusitis  We will treat as above, I also think there is an allergic component start Flonase    The following high BMI interventions were performed this visit: encouragement to exercise and lifestyle education regarding diet    Return in about 4 weeks (around 5/31/2017) for recheck.     History of Present Illness   This 41 y.o. old woman comes in for evaluation of 1 week of sinus pressure, sore throat, cough productive of greenish sputum, ear pain and popping.  No fever or chills.  She has had facial tenderness.  She is using her Symbicort ran out of albuterol.  Does not use any nasal sprays.  Has some sneezing watery eyes.    Review of Systems: A comprehensive review of systems was negative except as noted.     Medications, Allergies and Problem List   Reviewed and updated     Physical Exam   General Appearance:   No acute distress    /84 (Patient Site: Right Arm, Patient Position: Sitting, Cuff Size: Adult Regular)  Pulse 69  Ht 5' 3\" (1.6 m)  Wt (!) 248 lb (112.5 kg)  SpO2 98%  BMI 43.93 kg/m2    She has bilateral scarring of her tympanic membrane some fluid behind the left tympanic membrane lymphoid reticulation the posterior oropharynx bogginess of her nasal turbinates no cervical lymphadenopathy cardiovascular regular rate and rhythm no murmur gallop or rub lungs with diffuse expiratory wheezing      Additional " Information   Current Outpatient Prescriptions   Medication Sig Dispense Refill     acetaminophen (TYLENOL) 500 MG tablet Take 500-1,000 mg by mouth every 6 (six) hours as needed. Maximum of 3000 mg per 24 hours.       albuterol (PROAIR HFA) 90 mcg/actuation inhaler Inhale 1-2 puffs every 4 (four) hours as needed for wheezing. 1 Inhaler 6     budesonide-formoterol (SYMBICORT) 160-4.5 mcg/actuation inhaler Inhale 2 puffs 2 (two) times a day. 1 Inhaler 11     escitalopram oxalate (LEXAPRO) 10 MG tablet Take 0.5 tablets (5 mg total) by mouth daily. 30 tablet 2     ibuprofen (ADVIL,MOTRIN) 200 MG tablet Take 200-400 mg by mouth every 6 (six) hours as needed.       metoprolol tartrate (LOPRESSOR) 25 MG tablet Take 1 tablet (25 mg total) by mouth 2 (two) times a day. 60 tablet 3     doxycycline (ADOXA) 100 MG tablet Take 1 tablet (100 mg total) by mouth 2 (two) times a day for 5 days. 10 tablet 0     fluticasone (FLONASE) 50 mcg/actuation nasal spray 2 sprays into each nostril daily. 16 g 11     predniSONE (DELTASONE) 20 MG tablet Take 2 tablets (40 mg total) by mouth daily for 5 days. 10 tablet 0     No current facility-administered medications for this visit.      Allergies   Allergen Reactions     Ampicillin      Ciprofloxacin      Lamotrigine Other (See Comments)     Tongue and throat itching     Penicillins      Ranitidine Other (See Comments)     Pt states it makes her feel like she wants to commit suicide     Varenicline      Annotation: suicidal       Aspirin Rash     Social History   Substance Use Topics     Smoking status: Former Smoker     Packs/day: 1.00     Types: Cigarettes     Quit date: 2/16/2015     Smokeless tobacco: Never Used      Comment: uses e cigarette 4/7/15     Alcohol use No       Review and/or order of clinical lab tests:  Review and/or order of radiology tests:  Review and/or order of medicine tests:  Discussion of test results with performing physician:  Decision to obtain old records and/or  obtain history from someone other than the patient:  Review and summarization of old records and/or obtaining history from someone other than the patient and.or discussion of case with another health care provider:  Independent visualization of image, tracing or specimen itself:    Time:      Nicko Reyes MD

## 2021-06-10 NOTE — PROGRESS NOTES
"  Office Visit - Follow Up   Adriane Garrett   41 y.o. female    Date of Visit: 5/23/2017    Chief Complaint   Patient presents with     Sunburn        Assessment and Plan   1. Sunburn  Treat with silver sulfadiazine and topical steroids.  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     History of Present Illness   This 41 y.o. old woman was out in the sun on Mother's Day and was not wearing sunscreen.  She suffered sunburn to her shoulders and neck.  She had some blistering.  She has had ongoing pain.  No increase in the redness.  No purulent drainage.  No fever chills.  He has been putting on aloe vera lotion without much benefit.  Does not like to wear sunscreen.    Review of Systems: A comprehensive review of systems was negative except as noted.     Medications, Allergies and Problem List   Reviewed and updated     Physical Exam   General Appearance:   No acute distress    /74 (Patient Site: Left Arm, Patient Position: Sitting, Cuff Size: Adult Regular)  Pulse 88  Ht 5' 3\" (1.6 m)  Wt (!) 249 lb (112.9 kg)  SpO2 96%  BMI 44.11 kg/m2    She does have some sunburn on her neck and shoulders, some nodularity no active blisters, no third-degree burns.       Additional Information   Current Outpatient Prescriptions   Medication Sig Dispense Refill     acetaminophen (TYLENOL) 500 MG tablet Take 500-1,000 mg by mouth every 6 (six) hours as needed. Maximum of 3000 mg per 24 hours.       albuterol (PROAIR HFA) 90 mcg/actuation inhaler Inhale 1-2 puffs every 4 (four) hours as needed for wheezing. 1 Inhaler 6     budesonide-formoterol (SYMBICORT) 160-4.5 mcg/actuation inhaler Inhale 2 puffs " 2 (two) times a day. 1 Inhaler 11     escitalopram oxalate (LEXAPRO) 10 MG tablet Take 0.5 tablets (5 mg total) by mouth daily. 30 tablet 2     fluticasone (FLONASE) 50 mcg/actuation nasal spray 2 sprays into each nostril daily. 16 g 11     ibuprofen (ADVIL,MOTRIN) 200 MG tablet Take 200-400 mg by mouth every 6 (six) hours as needed.       metoprolol tartrate (LOPRESSOR) 25 MG tablet Take 1 tablet (25 mg total) by mouth 2 (two) times a day. 60 tablet 3     fluocinonide-emollient (FLUOCINONIDE-EMOLLIENT) 0.05 % Crea Apply 1 application topically 2 (two) times a day as needed. 60 g 5     silver sulfADIAZINE (SILVADENE, SSD) 1 % cream Apply topically 2 (two) times a day for 7 days. 50 g 1     No current facility-administered medications for this visit.      Allergies   Allergen Reactions     Ampicillin      Ciprofloxacin      Lamotrigine Other (See Comments)     Tongue and throat itching     Penicillins      Ranitidine Other (See Comments)     Pt states it makes her feel like she wants to commit suicide     Varenicline      Annotation: suicidal       Aspirin Rash     Social History   Substance Use Topics     Smoking status: Former Smoker     Packs/day: 1.00     Types: Cigarettes     Quit date: 2/16/2015     Smokeless tobacco: Never Used      Comment: uses e cigarette 4/7/15     Alcohol use No       Review and/or order of clinical lab tests:  Review and/or order of radiology tests:  Review and/or order of medicine tests:  Discussion of test results with performing physician:  Decision to obtain old records and/or obtain history from someone other than the patient:  Review and summarization of old records and/or obtaining history from someone other than the patient and.or discussion of case with another health care provider:  Independent visualization of image, tracing or specimen itself:    Time:      Nicko Reyes MD

## 2021-06-11 NOTE — PROGRESS NOTES
"  Office Visit - Follow Up   Adriane Garrett   41 y.o. female    Date of Visit: 6/7/2017    Chief Complaint   Patient presents with     Cough        Assessment and Plan   1. Mild persistent asthma  Continue with Symbicort, add Spiriva continue albuterol, Benadryl for allergies    2. 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 Morbid obesity due to excess calories  The following high BMI interventions were performed this visit: encouragement to exercise and lifestyle education regarding diet    Return in about 4 weeks (around 7/5/2017) for recheck.     History of Present Illness   This 41 y.o. old woman comes in for follow-up of numerous medical problems.  Last time she came in she had had a severe sunburn flare of her Darier disease.  She reports that this is almost resolved.  She still refuses to wear sunscreen.  She reports ongoing coughing.  Some of this is related to seasonal allergies.  She is using her inhaled corticosteroid with long-acting beta agonist as well as albuterol.  She is using albuterol more frequently than she usually does.  She previously been on Spiriva and felt that this is quite helpful.  She does not have any fever or chill.  Minimal shortness of breath.    Review of Systems: A comprehensive review of systems was negative except as noted.     Medications, Allergies and Problem List   Reviewed and updated     Physical Exam   General Appearance:   No acute distress    Pulse 67  Ht 5' 3\" (1.6 m)  Wt (!) 250 lb (113.4 kg)  SpO2 98%  BMI 44.29 kg/m2    HEENT exam is unremarkable, neck supple, no cervical lymphadenopathy, cardiovascular regular rate and rhythm no murmur gallop or rub, lungs are clear to auscultation bilaterally, abdomen soft nontender nondistended no organomegaly, neurologic exam is nonfocal, psychiatric pleasant, no confusion or agitation   Sunburn has healed      Additional Information "   Current Outpatient Prescriptions   Medication Sig Dispense Refill     acetaminophen (TYLENOL) 500 MG tablet Take 500-1,000 mg by mouth every 6 (six) hours as needed. Maximum of 3000 mg per 24 hours.       albuterol (PROAIR HFA) 90 mcg/actuation inhaler Inhale 1-2 puffs every 4 (four) hours as needed for wheezing. 1 Inhaler 6     budesonide-formoterol (SYMBICORT) 160-4.5 mcg/actuation inhaler Inhale 2 puffs 2 (two) times a day. 1 Inhaler 11     escitalopram oxalate (LEXAPRO) 10 MG tablet Take 0.5 tablets (5 mg total) by mouth daily. 30 tablet 2     fluocinonide-emollient (FLUOCINONIDE-EMOLLIENT) 0.05 % Crea Apply 1 application topically 2 (two) times a day as needed. 60 g 5     fluticasone (FLONASE) 50 mcg/actuation nasal spray 2 sprays into each nostril daily. 16 g 11     ibuprofen (ADVIL,MOTRIN) 200 MG tablet Take 200-400 mg by mouth every 6 (six) hours as needed.       metoprolol tartrate (LOPRESSOR) 25 MG tablet Take 1 tablet (25 mg total) by mouth 2 (two) times a day. 60 tablet 3     tiotropium (SPIRIVA) 18 mcg inhalation capsule Place 1 capsule (2 puffs total) into inhaler and inhale daily. 90 capsule 3     No current facility-administered medications for this visit.      Allergies   Allergen Reactions     Ampicillin      Ciprofloxacin      Lamotrigine Other (See Comments)     Tongue and throat itching     Penicillins      Ranitidine Other (See Comments)     Pt states it makes her feel like she wants to commit suicide     Varenicline      Annotation: suicidal       Aspirin Rash     Social History   Substance Use Topics     Smoking status: Former Smoker     Packs/day: 1.00     Types: Cigarettes     Quit date: 2/16/2015     Smokeless tobacco: Never Used      Comment: uses e cigarette 4/7/15     Alcohol use No       Review and/or order of clinical lab tests:  Review and/or order of radiology tests:  Review and/or order of medicine tests:  Discussion of test results with performing physician:  Decision to obtain  old records and/or obtain history from someone other than the patient:  Review and summarization of old records and/or obtaining history from someone other than the patient and.or discussion of case with another health care provider:  Independent visualization of image, tracing or specimen itself:    Time:      Nicko Reyes MD

## 2021-06-12 NOTE — PROGRESS NOTES
Office Visit - Follow Up   Adriane Garrett   41 y.o. female    Date of Visit: 8/22/2017    Chief Complaint   Patient presents with     Abdominal Pain     Back Pain     Diarrhea        Assessment and Plan   1. Generalized abdominal pain  This is a chronic problem she has never had an upper endoscopy and we will proceed with this.  Otherwise she should get back to pain clinic for management of chronic pain  - Ambulatory referral for Upper GI Endoscopy    2. Essential hypertension with goal blood pressure less than 140/90  Blood pressure is controlled    3. Morbid obesity due to excess calories  Weight loss recommended    4. Cough  She does use her inhalers she smokes electronic cigarette that is, she [facility], frequent episodes of bronchitis, will have her see pulmonary.  Recent CT scan was unremarkable.  She would benefit from pulmonary function testing  - Ambulatory referral to Pulmonology      The following high BMI interventions were performed this visit: encouragement to exercise and lifestyle education regarding diet    Return in about 2 weeks (around 9/5/2017) for recheck.     History of Present Illness   This 41 y.o. old woman with a history of chronic pain, frequent bronchitis, obesity, history of tobacco abuse comes in for evaluation of abdominal pain.  This is not new and she has had extensive evaluation of this.  She is managed in pain clinic was on morphine but ran out of insurance.  She has not resumed morphine.  It did not really think seem like the morphine was helping all that much.  She continued to complain about abdominal pain.  She has had a colonoscopy which was normal or negative.  She has had 2 MRIs of the abdomen which have been normal.  She has never had an upper endoscopy.  She does complain of some reflux symptoms and pain after eating as well as nausea without vomiting.  In addition to this she is complaining about cough and some shortness of breath.  She frequently has treated with  "prednisone and antibiotics which do seem to help.  She did have a recent pneumonia.  She has had no fever chills.    Review of Systems: A comprehensive review of systems was negative except as noted.     Medications, Allergies and Problem List   Reviewed and updated     Physical Exam   General Appearance:   No acute distress    /74 (Patient Site: Left Arm, Patient Position: Sitting, Cuff Size: Adult Regular)  Pulse 77  Ht 5' 3\" (1.6 m)  Wt (!) 241 lb (109.3 kg)  SpO2 98%  BMI 42.69 kg/m2    HEENT exam is unremarkable neck supple cardiovascular regular rate and rhythm no murmur gallop or rub lungs clear to auscultation bilaterally abdomen is obese soft nontender nondistended no organomegaly she has no short hot or swollen joints no joint deformity psychiatric Pleasant and appropriate neurologic nonfocal skin she does have some nodules of her chest which are chronic and unchanged     Additional Information   Current Outpatient Prescriptions   Medication Sig Dispense Refill     acetaminophen (TYLENOL) 500 MG tablet Take 500-1,000 mg by mouth every 6 (six) hours as needed. Maximum of 3000 mg per 24 hours.       albuterol (PROAIR HFA) 90 mcg/actuation inhaler Inhale 1-2 puffs every 4 (four) hours as needed for wheezing. 1 Inhaler 6     budesonide-formoterol (SYMBICORT) 160-4.5 mcg/actuation inhaler Inhale 2 puffs 2 (two) times a day. 1 Inhaler 11     diphenhydrAMINE (BENADRYL) 25 mg capsule Take 1-2 capsules (25-50 mg total) by mouth at bedtime as needed for sleep. 30 capsule 2     fluocinonide-emollient (FLUOCINONIDE-EMOLLIENT) 0.05 % Crea Apply 1 application topically 2 (two) times a day as needed. 60 g 5     fluticasone (FLONASE) 50 mcg/actuation nasal spray 2 sprays into each nostril daily. 16 g 11     ibuprofen (ADVIL,MOTRIN) 200 MG tablet Take 200-400 mg by mouth every 6 (six) hours as needed.       metoprolol tartrate (LOPRESSOR) 25 MG tablet Take 1 tablet (25 mg total) by mouth 2 (two) times a day. 60 " tablet 3     tiotropium (SPIRIVA) 18 mcg inhalation capsule Place 1 capsule (2 puffs total) into inhaler and inhale daily. 90 capsule 3     No current facility-administered medications for this visit.      Allergies   Allergen Reactions     Ampicillin      Ciprofloxacin      Lamotrigine Other (See Comments)     Tongue and throat itching     Penicillins      Ranitidine Other (See Comments)     Pt states it makes her feel like she wants to commit suicide     Varenicline      Annotation: suicidal       Aspirin Rash     Social History   Substance Use Topics     Smoking status: Former Smoker     Packs/day: 1.00     Types: Cigarettes     Quit date: 2/16/2015     Smokeless tobacco: Never Used      Comment: uses e cigarette 4/7/15     Alcohol use No       Review and/or order of clinical lab tests:  Review and/or order of radiology tests:  Review and/or order of medicine tests:  Discussion of test results with performing physician:  Decision to obtain old records and/or obtain history from someone other than the patient:  Review and summarization of old records and/or obtaining history from someone other than the patient and.or discussion of case with another health care provider:  Independent visualization of image, tracing or specimen itself:    Time:      Nicko Reyes MD

## 2021-06-12 NOTE — PROGRESS NOTES
Preoperative Consultation   Adriane Garrett   42 y.o.  female    Date of visit: 9/7/2017  Physician: Nicko Reyes MD    This is a preoperative consultation in preparation for EGD on 9/15/17 at [facility].       Assessment and Plan   Adriane Garrett was seen in preoperative consultation in preparation for EGD.  This is a low risk surgery and the patient has no increased risk for major cardiac complications.  Please note she will continue her inhalers, no current respiratory symptoms.  She will take metoprolol the morning of procedure.  Regarding small area of cellulitis on her back will treat with Keflex.    1. Preoperative examination    2. Epigastric pain    3. Bipolar 1 disorder    4. Essential hypertension with goal blood pressure less than 140/90    5. Mild persistent asthma without complication    6. Cellulitis of other specified site         Patient Profile   Social History     Social History Narrative    Staying with JONO.  Not working.  Five children oldest three children given up for adoption.  Her inlaws raise the youngest children.  Kvng (2002) and Thao (2000).  Also has contact with Irving.          Past Medical History   Patient Active Problem List   Diagnosis     Morbid obesity     Vitamin D deficiency     Hyperlipidemia     Nonalcoholic Fatty Liver Disease     Migraine Headache     TMJ Pain     Hypertension     Chronic abdominal pain     Bipolar 1 disorder     Darier disease - [facility]       Past Surgical History  She has a past surgical history that includes removal gallbladder; Total abdominal hysterectomy w/ bilateral salpingoophorectomy; and Foot surgery (Right).     History of Present Illness   This 42 y.o. old woman comes in for preoperative evaluation.  She has chronic abdominal pain and is going to get an upper endoscopy.  It appears that this was requested as an inpatient procedure.  Currently she feels okay.  She has no breathing difficulty.  She does have some  asthma but has had normal spirometry and chest x-rays.  She is on inhalers.  She does not smoke conventional cigarettes.  She does smoke electronic cigarettes and has been decreasing the nicotine.  She has a sore on the back she would like me to look at.  She is on low-dose metoprolol.  She has had a hysterectomy and oophorectomy.    Recent antiplatelet use: no  Personal or family history of bleeding or clotting disorders: no  Steroid use in the past year: yes occasional use for asthma exacerbation  Personal or family history of difficulty with anesthesia: no  Current cardiopulmonary symptoms: no    Review of Systems: A comprehensive review of systems was negative except as noted.     Medications and Allergies   Current Outpatient Prescriptions   Medication Sig Dispense Refill     acetaminophen (TYLENOL) 500 MG tablet Take 500-1,000 mg by mouth every 6 (six) hours as needed. Maximum of 3000 mg per 24 hours.       albuterol (PROAIR HFA) 90 mcg/actuation inhaler Inhale 1-2 puffs every 4 (four) hours as needed for wheezing. 1 Inhaler 6     budesonide-formoterol (SYMBICORT) 160-4.5 mcg/actuation inhaler Inhale 2 puffs 2 (two) times a day. 1 Inhaler 11     diphenhydrAMINE (BENADRYL) 25 mg capsule Take 1-2 capsules (25-50 mg total) by mouth at bedtime as needed for sleep. 30 capsule 2     fluocinonide-emollient (FLUOCINONIDE-EMOLLIENT) 0.05 % Crea Apply 1 application topically 2 (two) times a day as needed. 60 g 5     fluticasone (FLONASE) 50 mcg/actuation nasal spray 2 sprays into each nostril daily. 16 g 11     ibuprofen (ADVIL,MOTRIN) 200 MG tablet Take 200-400 mg by mouth every 6 (six) hours as needed.       metoprolol tartrate (LOPRESSOR) 25 MG tablet Take 1 tablet (25 mg total) by mouth 2 (two) times a day. 60 tablet 3     tiotropium (SPIRIVA) 18 mcg inhalation capsule Place 1 capsule (2 puffs total) into inhaler and inhale daily. 90 capsule 3     cephalexin (KEFLEX) 500 MG capsule Take 1 capsule (500 mg total) by  mouth 4 (four) times a day for 10 days. 40 capsule 0     No current facility-administered medications for this visit.      Allergies   Allergen Reactions     Ampicillin      Ciprofloxacin      Lamotrigine Other (See Comments)     Tongue and throat itching     Penicillins      Ranitidine Other (See Comments)     Pt states it makes her feel like she wants to commit suicide     Varenicline      Annotation: suicidal       Aspirin Rash        Family and Social History   Family History   Problem Relation Age of Onset     Diabetes Mother      Diabetes Sister      Diabetes Nephew      Diabetes Child      Heart disease Brother      coronary stent at 44     Coronary Stenting Brother      Stroke Paternal Uncle      Aneurysm Father         Social History   Substance Use Topics     Smoking status: Former Smoker     Packs/day: 1.00     Types: Cigarettes     Quit date: 2/16/2015     Smokeless tobacco: Never Used      Comment: uses e cigarette 4/7/15     Alcohol use No        Physical Exam   General Appearance:   No acute distress    /74 (Patient Site: Left Arm, Patient Position: Sitting, Cuff Size: Adult Regular)  Pulse (!) 57  Ht 5' (1.524 m)  Wt (!) 243 lb (110.2 kg)  SpO2 97%  BMI 47.46 kg/m2    EYES: Eyelids, conjunctiva, and sclera were normal. Pupils were normal. Cornea, iris, and lens were normal bilaterally.  HEAD, EARS, NOSE, MOUTH, AND THROAT: Head and face were normal. Hearing was normal to voice and the ears were normal to external exam. Nose appearance was normal and there was no discharge. Oropharynx was normal, edentulous  NECK: Neck appearance was normal. There were no neck masses and the thyroid was not enlarged.  RESPIRATORY: Breathing pattern was normal and the chest moved symmetrically.  Percussion/auscultatory percussion was normal.  Lung sounds were normal and there were no abnormal sounds.  CARDIOVASCULAR: Heart rate and rhythm were normal.  S1 and S2 were normal and there were no extra sounds or  murmurs. Peripheral pulses in arms and legs were normal.  Jugular venous pressure was normal.  There was no peripheral edema.  GASTROINTESTINAL: The abdomen was normal in contour.  Bowel sounds were present.  Percussion detected no organ enlargement or tenderness.  Palpation detected no tenderness, mass, or enlarged organs.   MUSCULOSKELETAL: Skeletal configuration was normal and muscle mass was normal for age. Joint appearance was overall normal.  LYMPHATIC: There were no enlarged nodes.  SKIN/HAIR/NAILS: Skin color was normal.  Small area of cellulitis on her back hair and nails were normal.  NEUROLOGIC: The patient was alert and oriented to person, place, time, and circumstance. Speech was normal. Cranial nerves were normal. Motor strength was normal for age. The patient was normally coordinated.  PSYCHIATRIC:  Mood and affect were normal and the patient had normal recent and remote memory. The patient's judgment and insight were normal.       Additional Tests   Laboratory: Recent labs from the emergency room were normal urine CMP and CBC    Radiology: Recent chest x-ray was normal CT of the chest normal or negative    Spirometry normal, FEV1 to FVC ratio 90 % of predicted    Total time spent with the patient today was 40 minutes of which > 50% was spent in counseling and coordination of care     Nicko Reyes MD  Internal Medicine  Contact me at [phone]

## 2021-06-12 NOTE — PROGRESS NOTES
"  Office Visit - Follow Up   Adriane Garrett   41 y.o. female    Date of Visit: 8/1/2017    Chief Complaint   Patient presents with     Cough        Assessment and Plan   1. Cough  We will evaluate her cough and hemoptysis with a chest CT, previous x-rays have been unremarkable, treat for COPD/asthma exacerbation, doxycycline and prednisone, I recommended pulmonary consult and she will consider this  - CT Chest High Resolution Without Contrast; Future    2. Hemoptysis  - CT Chest High Resolution Without Contrast; Future    The following high BMI interventions were performed this visit: encouragement to exercise and lifestyle education regarding diet    Return in about 4 weeks (around 8/29/2017) for recheck.     History of Present Illness   This 41 y.o. old woman comes in for cough, shortness of breath and some hemoptysis.  Patient does have underlying asthma and uses her inhalers.  She uses an electronic cigarette.  She does get flares of bronchitis and in the past is also had some hemoptysis.  We evaluated with chest x-ray which were unremarkable.  She has never had a CT of her chest.  She does not have a lot of hemoptysis.  She has had really no chest pain.  She has had no weight changes no night sweats.  Her sputum is otherwise greenish brown.    Review of Systems: A comprehensive review of systems was negative except as noted.     Medications, Allergies and Problem List   Reviewed and updated     Physical Exam   General Appearance:   No acute distress    /72 (Patient Site: Left Arm, Patient Position: Sitting, Cuff Size: Adult Regular)  Pulse (!) 59  Ht 5' 3\" (1.6 m)  Wt (!) 245 lb (111.1 kg)  SpO2 98%  BMI 43.4 kg/m2    HEENT exam is unremarkable, neck supple, no cervical lymphadenopathy, cardiovascular regular rate and rhythm no murmur gallop or rub, lungs are clear to auscultation bilaterally, abdomen soft nontender nondistended no organomegaly, neurologic exam is nonfocal, psychiatric pleasant, no " confusion or agitation        Additional Information   Current Outpatient Prescriptions   Medication Sig Dispense Refill     acetaminophen (TYLENOL) 500 MG tablet Take 500-1,000 mg by mouth every 6 (six) hours as needed. Maximum of 3000 mg per 24 hours.       albuterol (PROAIR HFA) 90 mcg/actuation inhaler Inhale 1-2 puffs every 4 (four) hours as needed for wheezing. 1 Inhaler 6     budesonide-formoterol (SYMBICORT) 160-4.5 mcg/actuation inhaler Inhale 2 puffs 2 (two) times a day. 1 Inhaler 11     fluocinonide-emollient (FLUOCINONIDE-EMOLLIENT) 0.05 % Crea Apply 1 application topically 2 (two) times a day as needed. 60 g 5     fluticasone (FLONASE) 50 mcg/actuation nasal spray 2 sprays into each nostril daily. 16 g 11     ibuprofen (ADVIL,MOTRIN) 200 MG tablet Take 200-400 mg by mouth every 6 (six) hours as needed.       metoprolol tartrate (LOPRESSOR) 25 MG tablet Take 1 tablet (25 mg total) by mouth 2 (two) times a day. 60 tablet 3     tiotropium (SPIRIVA) 18 mcg inhalation capsule Place 1 capsule (2 puffs total) into inhaler and inhale daily. 90 capsule 3     diphenhydrAMINE (BENADRYL) 25 mg capsule Take 1-2 capsules (25-50 mg total) by mouth at bedtime as needed for sleep. 30 capsule 2     doxycycline (ADOXA) 100 MG tablet Take 1 tablet (100 mg total) by mouth 2 (two) times a day. 14 tablet 0     predniSONE (DELTASONE) 10 mg tablet Take 3 tablets (30 mg total) by mouth daily for 5 days. 15 tablet 0     No current facility-administered medications for this visit.      Allergies   Allergen Reactions     Ampicillin      Ciprofloxacin      Lamotrigine Other (See Comments)     Tongue and throat itching     Penicillins      Ranitidine Other (See Comments)     Pt states it makes her feel like she wants to commit suicide     Varenicline      Annotation: suicidal       Aspirin Rash     Social History   Substance Use Topics     Smoking status: Former Smoker     Packs/day: 1.00     Types: Cigarettes     Quit date:  2/16/2015     Smokeless tobacco: Never Used      Comment: uses e cigarette 4/7/15     Alcohol use No       Review and/or order of clinical lab tests:  Review and/or order of radiology tests:  Review and/or order of medicine tests:  Discussion of test results with performing physician:  Decision to obtain old records and/or obtain history from someone other than the patient:  Review and summarization of old records and/or obtaining history from someone other than the patient and.or discussion of case with another health care provider:  Independent visualization of image, tracing or specimen itself:    Time:      Nicko Reyes MD

## 2021-06-13 NOTE — PROGRESS NOTES
Pulmonary Clinic Consult Note  Date of Service: 10/3/2017    Reason for Consultation: Cough    History:     HPI: Patient is a 42-year-old female was referred here for evaluation of cough for over a year.    Patient has history of asthma.  She states that she was diagnosed with asthma approximately 20 years ago.  Precipitating factors include exertion, seasonal changes, cold weather, humidity, and pollens. She notes that she wheezes and she notes that she does have nocturnal symptoms. She states that she wakes up however due to cough. She thinks that this has been present for over a year. She thinks cough is worse at night and when she lays down. She describes her cough as productive but not productive at other times.  For her asthma, she is albuterol that she uses approximately 3 time/day.  She is on Symbicort for about 1 years, and Spiriva for 1-1.5 years.  She denies any previous history of intubations or bipap for her asthma. She notes that has several exacerbations for asthma but then notes that her doctors stopped giving her prednisone/abx.     Pt has GERD for which she takes Prilosec, has been on it for about 2 weeks.  She also has postnasal drip for which she is on Flonase (once daily), has been using flonase for 3 months.   She stopped smoking cigarettes and now vapes. She notes that she started at 18 mg of nicotine and now down to 3 mg.      PMHx/PSHx:  Postnasal drip  GERD  History of asthma  Chronic abdominal pain  Hypertension  Morbid obesity  Bipolar disorder  History of cholecystectomy  History of oophorectomy and hysterectomy    Social Hx:    Lives in house sister in law  Has a dog at home  No birds  Former smoker.  Used to smoke 1-1.5 pack per day.  She quit in 2015.  She smoked for approximately 30 years. She has about 40 pack year history. Currently smoking electronic cigarette for about few years.   Work: stay at home.    Family History   Problem Relation Age of Onset     Diabetes Mother       Diabetes Sister      Diabetes Nephew      Diabetes Child      Heart disease Brother      coronary stent at 44     Coronary Stenting Brother      Stroke Paternal Uncle      Aneurysm Father        Review of Systems - 10 point review of system negative except for what is mentioned in the HPI.  Meds and Allergies:  See EHR for the updated medication list and Allergies. These were reviewed.     Exam/Data:   /76  Pulse 76  Resp 20  Ht 5' (1.524 m)  Wt (!) 241 lb 8 oz (109.5 kg)  SpO2 95% Comment: RA  BMI 47.16 kg/m2    EXAM:  GEN: comfortable, NAD  HEENT: NCAT, EMOI, mmm  LN: no cervical LAD   CVS: S1S2, RRR  Lung: good air entry bilaterally, no wheezing  Abd: soft, nt, obese  Ext: no c/c/e  Vasc: intact radial pulses bilaterally  Neuro: nonfocal  Skin: no visible rash  Musculoskeletal: FROM all extremities  Psych: normal affect    DATA      PFT DATA 10/2017:  FEV1 84%, FVC 90%, ratio 76. No reversibility. , RV 68. DLCO 119.    IMAGING:     HRCT 8/2017:  Negative high-resolution CT chest.      Assessment/Plan:   Adriane Garrett is a 42 y.o. female with h/o sinusitis, asthma, GERD, and PND, and ?? Copd was referred here for cough. Her cough is present mostly at night and wakes her up from sleep, but not present throughout the day.    Cough - etiology likely Upper airway cough syndrome  She is on flonase and PPI. She has been on flonase for about 6 months and PPI for about 2 weeks  Would recommend continuing PPI as it is too early to tell  Given how severe her cough and the fact it wakes pt up multiple times at night, I will Singulair due to her sinusitis and nasal stuffiness  Elevated head of bed.    Asthma:  On albuterol and Symbicort  No e/o copd/emphysema. I will go ahead and stop spiriva. Can consider restarting if feel worse  Counseled on need to quit electronic cigarettes    FOLLOW UP: 3 months    Anny Morton MD  Pulmonary and Critical Care Medicine  10/3/2017        Allergies   Allergen  Reactions     Ampicillin      Ciprofloxacin      Lamotrigine Other (See Comments)     Tongue and throat itching     Penicillins      Ranitidine Other (See Comments)     Pt states it makes her feel like she wants to commit suicide     Varenicline      Annotation: suicidal       Aspirin Rash       Medications:     Current Outpatient Prescriptions   Medication Sig Dispense Refill     acetaminophen (TYLENOL) 500 MG tablet Take 500-1,000 mg by mouth every 6 (six) hours as needed. Maximum of 3000 mg per 24 hours.       albuterol (PROAIR HFA) 90 mcg/actuation inhaler Inhale 1-2 puffs every 4 (four) hours as needed for wheezing. 1 Inhaler 6     budesonide-formoterol (SYMBICORT) 160-4.5 mcg/actuation inhaler Inhale 2 puffs 2 (two) times a day. 1 Inhaler 11     clarithromycin (BIAXIN) 500 MG tablet Take 1 tablet (500 mg total) by mouth 2 (two) times a day for 14 days. 28 tablet 0     diphenhydrAMINE (BENADRYL) 25 mg capsule Take 1-2 capsules (25-50 mg total) by mouth at bedtime as needed for sleep. 30 capsule 2     fluocinonide-emollient (FLUOCINONIDE-EMOLLIENT) 0.05 % Crea Apply 1 application topically 2 (two) times a day as needed. 60 g 5     fluticasone (FLONASE) 50 mcg/actuation nasal spray 2 sprays into each nostril daily. 16 g 11     metoprolol tartrate (LOPRESSOR) 25 MG tablet Take 1 tablet (25 mg total) by mouth 2 (two) times a day. 60 tablet 3     metroNIDAZOLE (FLAGYL) 500 MG tablet Take 1 tablet (500 mg total) by mouth 3 (three) times a day for 14 days. 42 tablet 0     omeprazole (PRILOSEC) 20 MG capsule Take 1 capsule (20 mg total) by mouth 2 times a day at 6:00 am and 4:00 pm. 60 capsule 11     tiotropium (SPIRIVA) 18 mcg inhalation capsule Place 1 capsule (2 puffs total) into inhaler and inhale daily. 90 capsule 3     No current facility-administered medications for this visit.

## 2021-06-14 NOTE — PROGRESS NOTES
Office Visit - Follow Up   Adriane Garrett   42 y.o. female    Date of Visit: 11/10/2017    Chief Complaint   Patient presents with     Painful lump     On right thigh for 1 week        Assessment and Plan   1. Right thigh pain  Etiology uncertain, seems neuropathic but she is tender when I push along the lateral thigh will get an x-ray and I recommended topical medication such as capsaicin or menthol  - XR Femur Right 2 VWS; Future    2. Hypertension  Blood pressure looks okay today    3. Eustachian tube dysfunction, bilateral  Recommended follow-up with ENT, continue nasal steroids    4. Morbid obesity  The following high BMI interventions were performed this visit: encouragement to exercise and lifestyle education regarding diet    Return in about 3 months (around 2/10/2018) for recheck.     History of Present Illness   This 42 y.o. old woman comes in for follow-up of numerous medical problems and evaluation of right leg pain.  Overall she has been doing okay.  She has finished treatment for H. pylori infection.  Her stomach pain has improved.  She still has occasional abdominal pain.  She is not taking any antacids.  She has not yet done her stool sample has not been a month since she is off omeprazole.  Over the past couple days she has had right-sided leg pain.  This is a burning type pain.  Very superficial.  She has no known injury.  She does not notice a lump or anything.  She is able to walk without difficulty.  She has noticed no rash.  She has right ear pain and popping which is chronic.  She has not lost any weight.    Review of Systems: A comprehensive review of systems was negative except as noted.     Medications, Allergies and Problem List   Reviewed and updated     Physical Exam   General Appearance:   No acute Distor    /80  Pulse 60  Ht 5' (1.524 m)  Wt (!) 239 lb (108.4 kg)  SpO2 97%  BMI 46.68 kg/m2    HEENT exam is ago for bilateral effusions behind the tympanic membrane  Neck  supple no thyromegaly or nodule palpable  Lymphatic no cervical lymphadenopathy  Cardiovascular regular rate and rhythm no murmur gallop or rub  Pulmonary lungs are clear to auscultation bilaterally  Gastrointestinall abdomen soft nontender nondistended no organomegaly  Neurologic exam is non focal  Psychiatric pleasant, no confusion or agitation   Her right leg exam is unremarkable with exception of pain with palpation along the lateral thigh     Additional Information   Current Outpatient Prescriptions   Medication Sig Dispense Refill     acetaminophen (TYLENOL) 500 MG tablet Take 500-1,000 mg by mouth every 6 (six) hours as needed. Maximum of 3000 mg per 24 hours.       albuterol (PROAIR HFA) 90 mcg/actuation inhaler Inhale 1-2 puffs every 4 (four) hours as needed for wheezing. 1 Inhaler 6     diphenhydrAMINE (BENADRYL) 25 mg capsule Take 1-2 capsules (25-50 mg total) by mouth at bedtime as needed for sleep. 30 capsule 2     fluocinonide-emollient (FLUOCINONIDE-EMOLLIENT) 0.05 % Crea Apply 1 application topically 2 (two) times a day as needed. 60 g 5     fluticasone (FLONASE) 50 mcg/actuation nasal spray 2 sprays into each nostril daily. 16 g 11     fluticasone-salmeterol 113-14 mcg/actuation AePB Inhale 1 puff 2 (two) times a day. 1 each 5     metoprolol tartrate (LOPRESSOR) 25 MG tablet Take 1 tablet (25 mg total) by mouth 2 (two) times a day. 60 tablet 3     montelukast (SINGULAIR) 10 mg tablet Take 1 tablet (10 mg total) by mouth at bedtime. 90 tablet 3     omeprazole (PRILOSEC) 20 MG capsule Take 1 capsule (20 mg total) by mouth 2 times a day at 6:00 am and 4:00 pm. 60 capsule 11     tiotropium (SPIRIVA) 18 mcg inhalation capsule Place 1 capsule (2 puffs total) into inhaler and inhale daily. 90 capsule 3     capsaicin-methyl sal-menthol (ZIKS ARTHRITIS PAIN RELIEF) 0.025-12-1 % Crea Apply 1 application topically 2 (two) times a day. 56.6 g 0     No current facility-administered medications for this visit.       Allergies   Allergen Reactions     Ampicillin      Ciprofloxacin      Lamotrigine Other (See Comments)     Tongue and throat itching     Penicillins      Ranitidine Other (See Comments)     Pt states it makes her feel like she wants to commit suicide     Varenicline      Annotation: suicidal       Aspirin Rash     Social History   Substance Use Topics     Smoking status: Former Smoker     Packs/day: 1.00     Types: Cigarettes     Quit date: 2/16/2015     Smokeless tobacco: Never Used      Comment: uses e cigarette 4/7/15     Alcohol use No        Nicko Reyes MD

## 2021-06-14 NOTE — PROGRESS NOTES
Office Visit - Follow Up   Adriane Garrett   42 y.o. female    Date of Visit: 12/8/2017    Chief Complaint   Patient presents with     Follow-up     1 month follow up     Mass     Upper right leg, painful for a couple weeks- resolved now        Assessment and Plan   1. Hypertension  Blood pressures controlled    2. Cough  I think this is probably related to her gastroesophageal reflux disease.  I am skeptical that she will actually sending her H. pylori stool sample into that and I will have her resume omeprazole.  She is worried about a sinus infection and requests antibiotic and therefore we will give her azithromycin.    3. H. pylori infection  See above    4. Morbid obesity  The following high BMI interventions were performed this visit: encouragement to exercise and lifestyle education regarding diet    Return in about 4 weeks (around 1/5/2018) for recheck.     History of Present Illness   This 42 y.o. old woman comes in for evaluation of a cough.  She has history of a cough which had been attributed to asthma.  She had been having some abdominal pain and we performed an EGD and she was found to have H. pylori gastritis.  We treated with antibiotics and PPI and her cough improved.  She has seen pulmonary and she is felt to have asthma and was continued on inhaled corticosteroid and long-acting bronchodilator, Singulair and albuterol.  I had her stop PPI so that we could do a test of cure for H. pylori but she has repeatedly for gotten to send her stool in.  Her cough is worse at night she has some sinus congestion and sore throat.  No breathing difficulty and she is not smoking conventional cigarettes.    Review of Systems: A comprehensive review of systems was negative except as noted.     Medications, Allergies and Problem List   Reviewed and updated     Physical Exam   General Appearance:   No acute distress    /80  Pulse (!) 58  Ht 5' (1.524 m)  Wt (!) 242 lb (109.8 kg)  BMI 47.26 kg/m2    HEENT  exam is unremarkable  Neck supple no thyromegaly or nodule palpable  Lymphatic no cervical lymphadenopathy  Cardiovascular regular rate and rhythm no murmur gallop or rub  Pulmonary lungs are clear to auscultation bilaterally  Gastrointestinall abdomen soft nontender nondistended no organomegaly  Neurologic exam is non focal  Psychiatric pleasant, no confusion or agitation        Additional Information   Current Outpatient Prescriptions   Medication Sig Dispense Refill     acetaminophen (TYLENOL) 500 MG tablet Take 500-1,000 mg by mouth every 6 (six) hours as needed. Maximum of 3000 mg per 24 hours.       albuterol (PROAIR HFA) 90 mcg/actuation inhaler Inhale 1-2 puffs every 4 (four) hours as needed for wheezing. 1 Inhaler 6     capsaicin-methyl sal-menthol (ZIKS ARTHRITIS PAIN RELIEF) 0.025-12-1 % Crea Apply 1 application topically 2 (two) times a day. 56.6 g 0     diphenhydrAMINE (BENADRYL) 25 mg capsule Take 1-2 capsules (25-50 mg total) by mouth at bedtime as needed for sleep. 30 capsule 2     fluocinonide-emollient (FLUOCINONIDE-EMOLLIENT) 0.05 % Crea Apply 1 application topically 2 (two) times a day as needed. 60 g 5     fluticasone (FLONASE) 50 mcg/actuation nasal spray 2 sprays into each nostril daily. 16 g 11     fluticasone-salmeterol 113-14 mcg/actuation AePB Inhale 1 puff 2 (two) times a day. 1 each 5     montelukast (SINGULAIR) 10 mg tablet Take 1 tablet (10 mg total) by mouth at bedtime. 90 tablet 3     omeprazole (PRILOSEC) 20 MG capsule Take 1 capsule (20 mg total) by mouth daily before breakfast. 30 capsule 11     azithromycin (ZITHROMAX) 250 MG tablet Take 2 tablets by mouth day one and 1 tablet by mouth days 2-5 6 tablet 0     codeine-guaiFENesin (GUAIFENESIN AC)  mg/5 mL liquid Take 5 mL by mouth 3 (three) times a day as needed for cough. 120 mL 0     No current facility-administered medications for this visit.      Allergies   Allergen Reactions     Ampicillin      Ciprofloxacin       Lamotrigine Other (See Comments)     Tongue and throat itching     Penicillins      Ranitidine Other (See Comments)     Pt states it makes her feel like she wants to commit suicide     Varenicline      Annotation: suicidal       Aspirin Rash     Social History   Substance Use Topics     Smoking status: Former Smoker     Packs/day: 1.00     Types: Cigarettes     Quit date: 2/16/2015     Smokeless tobacco: Never Used      Comment: uses e cigarette 4/7/15     Alcohol use No       Review and/or order of clinical lab tests:  Review and/or order of radiology tests:  Review and/or order of medicine tests:  Discussion of test results with performing physician:  Decision to obtain old records and/or obtain history from someone other than the patient:  Review and summarization of old records and/or obtaining history from someone other than the patient and.or discussion of case with another health care provider:  Independent visualization of image, tracing or specimen itself:    Time:      Nicko Reyes MD

## 2021-06-17 ENCOUNTER — HOSPITAL ENCOUNTER (EMERGENCY)
Facility: CLINIC | Age: 46
Discharge: HOME | End: 2021-06-17
Attending: PHYSICIAN ASSISTANT | Admitting: PHYSICIAN ASSISTANT
Payer: OTHER GOVERNMENT

## 2021-06-17 ENCOUNTER — APPOINTMENT (OUTPATIENT)
Dept: GENERAL RADIOLOGY | Facility: CLINIC | Age: 46
End: 2021-06-17
Attending: PHYSICIAN ASSISTANT
Payer: OTHER GOVERNMENT

## 2021-06-17 VITALS
OXYGEN SATURATION: 98 % | TEMPERATURE: 97.1 F | BODY MASS INDEX: 44.99 KG/M2 | WEIGHT: 230.38 LBS | DIASTOLIC BLOOD PRESSURE: 126 MMHG | HEART RATE: 61 BPM | SYSTOLIC BLOOD PRESSURE: 152 MMHG | RESPIRATION RATE: 15 BRPM

## 2021-06-17 DIAGNOSIS — R07.89 CHEST WALL PAIN: ICD-10-CM

## 2021-06-17 DIAGNOSIS — R05.9 COUGH: ICD-10-CM

## 2021-06-17 LAB
ALBUMIN SERPL-MCNC: 3.6 G/DL (ref 3.4–5)
ALP SERPL-CCNC: 85 U/L (ref 40–150)
ALT SERPL W P-5'-P-CCNC: 64 U/L (ref 0–50)
ANION GAP SERPL CALCULATED.3IONS-SCNC: 5 MMOL/L (ref 3–14)
AST SERPL W P-5'-P-CCNC: 40 U/L (ref 0–45)
BASOPHILS # BLD AUTO: 0 10E9/L (ref 0–0.2)
BASOPHILS NFR BLD AUTO: 0.6 %
BILIRUB SERPL-MCNC: 0.3 MG/DL (ref 0.2–1.3)
BUN SERPL-MCNC: 9 MG/DL (ref 7–30)
CALCIUM SERPL-MCNC: 9 MG/DL (ref 8.5–10.1)
CHLORIDE SERPL-SCNC: 108 MMOL/L (ref 94–109)
CO2 SERPL-SCNC: 27 MMOL/L (ref 20–32)
CREAT SERPL-MCNC: 0.7 MG/DL (ref 0.52–1.04)
DEPRECATED S PYO AG THROAT QL EIA: NEGATIVE
DIFFERENTIAL METHOD BLD: NORMAL
EOSINOPHIL # BLD AUTO: 0.1 10E9/L (ref 0–0.7)
EOSINOPHIL NFR BLD AUTO: 1.4 %
ERYTHROCYTE [DISTWIDTH] IN BLOOD BY AUTOMATED COUNT: 13.2 % (ref 10–15)
GFR SERPL CREATININE-BSD FRML MDRD: >90 ML/MIN/{1.73_M2}
GLUCOSE SERPL-MCNC: 90 MG/DL (ref 70–99)
HCT VFR BLD AUTO: 40 % (ref 35–47)
HGB BLD-MCNC: 12.8 G/DL (ref 11.7–15.7)
IMM GRANULOCYTES # BLD: 0 10E9/L (ref 0–0.4)
IMM GRANULOCYTES NFR BLD: 0.3 %
LABORATORY COMMENT REPORT: NORMAL
LYMPHOCYTES # BLD AUTO: 2.9 10E9/L (ref 0.8–5.3)
LYMPHOCYTES NFR BLD AUTO: 44.1 %
MCH RBC QN AUTO: 30.3 PG (ref 26.5–33)
MCHC RBC AUTO-ENTMCNC: 32 G/DL (ref 31.5–36.5)
MCV RBC AUTO: 95 FL (ref 78–100)
MONOCYTES # BLD AUTO: 0.5 10E9/L (ref 0–1.3)
MONOCYTES NFR BLD AUTO: 7.8 %
NEUTROPHILS # BLD AUTO: 3 10E9/L (ref 1.6–8.3)
NEUTROPHILS NFR BLD AUTO: 45.8 %
NRBC # BLD AUTO: 0 10*3/UL
NRBC BLD AUTO-RTO: 0 /100
PLATELET # BLD AUTO: 329 10E9/L (ref 150–450)
POTASSIUM SERPL-SCNC: 3.7 MMOL/L (ref 3.4–5.3)
PROT SERPL-MCNC: 7.3 G/DL (ref 6.8–8.8)
RBC # BLD AUTO: 4.22 10E12/L (ref 3.8–5.2)
SARS-COV-2 RNA RESP QL NAA+PROBE: NEGATIVE
SODIUM SERPL-SCNC: 140 MMOL/L (ref 133–144)
SPECIMEN SOURCE: NORMAL
STREP GROUP A PCR: NOT DETECTED
TROPONIN I SERPL-MCNC: <0.015 UG/L (ref 0–0.04)
WBC # BLD AUTO: 6.6 10E9/L (ref 4–11)

## 2021-06-17 PROCEDURE — 84484 ASSAY OF TROPONIN QUANT: CPT | Performed by: PHYSICIAN ASSISTANT

## 2021-06-17 PROCEDURE — 99285 EMERGENCY DEPT VISIT HI MDM: CPT | Mod: 25

## 2021-06-17 PROCEDURE — 80053 COMPREHEN METABOLIC PANEL: CPT | Performed by: PHYSICIAN ASSISTANT

## 2021-06-17 PROCEDURE — 87635 SARS-COV-2 COVID-19 AMP PRB: CPT | Performed by: PHYSICIAN ASSISTANT

## 2021-06-17 PROCEDURE — 96360 HYDRATION IV INFUSION INIT: CPT

## 2021-06-17 PROCEDURE — 250N000013 HC RX MED GY IP 250 OP 250 PS 637: Performed by: PHYSICIAN ASSISTANT

## 2021-06-17 PROCEDURE — C9803 HOPD COVID-19 SPEC COLLECT: HCPCS

## 2021-06-17 PROCEDURE — 258N000003 HC RX IP 258 OP 636: Performed by: PHYSICIAN ASSISTANT

## 2021-06-17 PROCEDURE — 93005 ELECTROCARDIOGRAM TRACING: CPT

## 2021-06-17 PROCEDURE — 87651 STREP A DNA AMP PROBE: CPT | Performed by: PHYSICIAN ASSISTANT

## 2021-06-17 PROCEDURE — 999N001174 HC STATISTIC STREP A RAPID: Performed by: PHYSICIAN ASSISTANT

## 2021-06-17 PROCEDURE — 85025 COMPLETE CBC W/AUTO DIFF WBC: CPT | Performed by: PHYSICIAN ASSISTANT

## 2021-06-17 PROCEDURE — 71045 X-RAY EXAM CHEST 1 VIEW: CPT

## 2021-06-17 RX ORDER — ACETAMINOPHEN 500 MG
1000 TABLET ORAL ONCE
Status: COMPLETED | OUTPATIENT
Start: 2021-06-17 | End: 2021-06-17

## 2021-06-17 RX ORDER — ALBUTEROL SULFATE 90 UG/1
2 AEROSOL, METERED RESPIRATORY (INHALATION) EVERY 6 HOURS PRN
Qty: 8.5 G | Refills: 0 | Status: SHIPPED | OUTPATIENT
Start: 2021-06-17 | End: 2022-12-14

## 2021-06-17 RX ADMIN — ACETAMINOPHEN 1000 MG: 500 TABLET, FILM COATED ORAL at 19:04

## 2021-06-17 RX ADMIN — SODIUM CHLORIDE 1000 ML: 9 INJECTION, SOLUTION INTRAVENOUS at 18:46

## 2021-06-17 ASSESSMENT — ENCOUNTER SYMPTOMS
COUGH: 1
SHORTNESS OF BREATH: 1
CHEST TIGHTNESS: 1
CHILLS: 1
MYALGIAS: 1
NAUSEA: 1

## 2021-06-17 NOTE — ED PROVIDER NOTES
"  History     Chief Complaint:  Cough and Chills    HPI   Adriane Garrett is a 45 year old female with a history of GERD, asthma, and nicotine vaping who presents with cough, chills, nausea, and body aches for the past 3-4 days. She also complained of feeling short of breath and chest pressure \"like an elephant is sitting on [my] chest\". The patient also mentions that she develops post-tussive chest pain. She also noted she is tender to palpation. She explained she has been tested for covid twice, both negative, and has not been vaccinated. Her  has also tested negative for Covid multiple times. She noted family history of heart disease but denies any personal history.     Review of Systems   Constitutional: Positive for chills.   Respiratory: Positive for cough, chest tightness and shortness of breath.    Gastrointestinal: Positive for nausea.   Musculoskeletal: Positive for myalgias.   All other systems reviewed and are negative.    Allergies:  Ampicillin  Aspirin  Chantix [Varenicline]  Ciprofloxacin  Penicillins  Tessalon [Benzonatate]  Citalopram  Quinolones  Varenicline  Adhesives    Medications:    Tessalon  Pepcid  Reglan  Roxicodone    Past Medical History:    IBS  Liver disease  Morbid obesity  Migraine  Hypertension  Hyperlipidemia  Pancreatitis  Bipolar disorder  Asthma  GERD  Manic depression  Ovarian cyst  Darier disease  Pneumonia  Sinus bradycardia  Oral candidiasis  Bronchitis  Narcotic dependence  Endometriosis  Dysplastic nevus  Post cholecystectomy syndrome  Sialoadenitis  Sebaceous cyst  Hypomagnesemia  Gingivitis  Skin abscess  Furuncle  Herpes zoster  COPD    Past Surgical History:    Cholecystectomy  Hysterectomy and tubal ligation  Subtalar arthroereisis  Myringotomy with tube placement  EGD with biopsy  Oophorectomy laparotomy    Family History:    Brother: asthma  Father: stroke  Mother: diabetes  Sister: asthma, diabetes  Heart disease    Physical Exam     Patient Vitals for the " past 24 hrs:   BP Temp Temp src Pulse Resp SpO2 Weight   06/17/21 1945 (!) 152/126 -- -- 61 15 98 % --   06/17/21 1930 -- -- -- 52 19 91 % --   06/17/21 1915 124/51 -- -- 59 21 98 % --   06/17/21 1803 -- -- -- -- -- -- 104.5 kg (230 lb 6.1 oz)   06/17/21 1751 (!) 158/118 97.1  F (36.2  C) Temporal 59 18 97 % --       Physical Exam  General: Alert and interactive. Appears well. Cooperative and pleasant.   Eyes: The pupils are equal and round. EOMs intact. No scleral icterus.  ENT: No abnormalities to the external nose or ears. Mucous membranes moist. Posterior oropharynx is non-erythematous.      Neck: Trachea is in the midline. No nuchal rigidity.     CV: Regular rate and rhythm. Tenderness to anterior chest wall. S1 and S2 normal without murmur, click, gallop or rub.   Resp: Breath sounds are clear bilaterally, without rhonchi, wheezes, rales. Non-labored, no retractions or accessory muscle use. Intermittent cough.      GI: Abdomen is soft without distension. No tenderness to palpation. No peritoneal signs.    MS: Moving all extremities well. Good muscle tone.   Skin: Warm and dry. No rash or lesions noted.  Neuro: Alert and oriented x 3. No focal neurologic deficits. Good strength and sensation in upper and lower extremities. Psych: Awake. Alert.  Normal affect. Appropriate interactions.  Lymph: No anterior or posterior cervical lymphadenopathy noted.    Emergency Department Course   ECG:  ECG taken at 1839, ECG read at 1847  Sinus bradycardia  Otherwise normal ECG   Rate 56 bpm. TN interval 174 ms. QRS duration 110 ms. QT/QTc 456/440 ms. P-R-T axes 49 -4 38.     Imaging:  XR Chest Port 1 View  IMPRESSION:     Cardiac silhouette is normal in size, accentuated by projection. Mediastinal borders are well-defined. Corin and lung vascularity are normal. No airspace opacities or interstitial thickening. No pleural space abnormality.    Normal bone mineralization. No fractures or bone lesions.  Reading per  radiology    Laboratory:  CBC: WBC 6.6, HGB 12.8,      CMP: glucose: 90, ALT: 64 (H) o/w WNL (Creatinine 0.70)     Troponin I (Collected 1850): <0.015    Streptococcus A Rapid Scr w Reflx to PCR: negative     Symptomatic COVID-19 Virus (Coronavirus) by PCR Nasopharyngeal swab: negative    Emergency Department Course:    Reviewed:  I reviewed nursing notes, vitals, past history and care everywhere    Assessments:  1834 I obtained history and examined the patient as noted above.     1943 I rechecked the patient and explained findings.     Interventions:  1846 NS 1L IV  1904 Tylenol 1000mg PO    Disposition:  The patient was discharged to home.    Impression & Plan      Medical Decision Making:  Adriane Garrett is a 45 year old female who presents for evaluation of cough, chills, and chest pain. The workup in the Emergency Department is negative and reassuring. The differential is broad, including ACS, PE, cardiac tamponade, aortic dissection, pneumonia, pneumothorax, pericarditis, esophageal rupture, and others. The patient is well known to this facility, comes to the Emergency Department often.     EKG shows NSR without signs of ischemia and infarction and troponin negative, and thus I doubt ACS. PERC negative, and much less likely PE given infectious prodrome.  Additionally, the patient has no signs of tachypnea, tachycardia, or hypoxia. No signs of leukocytosis, anemia, renal dysfunction, electrolyte abnormalities and chest x-ray shows no signs of pneumonia, pneumothorax, or pleural effusions. Suggested rest, fluids, OTC decongestants. She is adamant that she would like cough syrup with codeine, which I do not prescribe. I suggested close follow up with PCP indicated. Return here for worsening chest pain, shortness of breath, vomiting, fevers, or other concerns.     Covid-19  Adriane Garrett was evaluated during a global COVID-19 pandemic, which necessitated consideration that the patient might be at risk for  infection with the SARS-CoV-2 virus that causes COVID-19.   Applicable protocols for evaluation were followed during the patient's care.   COVID-19 was considered as part of the patient's evaluation. The plan for testing is:  a test was obtained during this visit.    Diagnosis:    ICD-10-CM    1. Cough  R05    2. Chest wall pain  R07.89        Discharge Medications:  Discharge Medication List as of 6/17/2021  8:01 PM      START taking these medications    Details   albuterol (PROAIR HFA/PROVENTIL HFA/VENTOLIN HFA) 108 (90 Base) MCG/ACT inhaler Inhale 2 puffs into the lungs every 6 hours as needed for shortness of breath / dyspnea or wheezing, Disp-8.5 g, R-0, Local Print             Scribe Disclosure:  I, Kadi Patel, am serving as a scribe at 6:34 PM on 6/17/2021 to document services personally performed by Gregoria Dawkins PA based on my observations and the provider's statements to me.     I, Bobby Kenae, am serving as a scribe  at 10:57 PM on 6/17/2021 to document services personally performed by Gregoria Dawkins PA based on my observations and the provider's statements to me.        Gregoria Dawkins PA-C  06/18/21 0002

## 2021-06-18 LAB — INTERPRETATION ECG - MUSE: NORMAL

## 2021-06-18 NOTE — RESULT ENCOUNTER NOTE
Group A Streptococcus PCR is NEGATIVE  No treatment or change in treatment [facility] ED lab result Strep Group A protocol.

## 2021-06-18 NOTE — DISCHARGE INSTRUCTIONS
Use Tylenol for chest wall pain and heat to the chest wall.   Use Albuterol inhaler for shortness of breath.   Take OTC decongestants.     Discharge Instructions  Upper Respiratory Infection    The upper respiratory tract includes the sinuses, nasal passages, pharynx, and larynx. A URI, or upper respiratory infection, is an infection of any of the parts of the upper airway. Symptoms include runny nose, congestion, sneezing, sore throat, cough, and fever. URIs are almost always caused by a virus. Antibiotics do not help with viral infections, so are generally not prescribed. A URI is very contagious through coughing and nasal secretions; make sure you wash your hands often and clean surfaces after sneezing, coughing or touching them. While you should start to improve in 3 - 5 days, remember that sometimes a cough can linger for several weeks.    Generally, every Emergency Department visit should have a follow-up clinic visit with either a primary or a specialty clinic/provider. Please follow-up as instructed by your emergency provider today.    Return to the Emergency Department if:  Any of your symptoms get much worse.  You seem very sick, like being too weak to get up.  You have chest pain or shortness of breath.   You have a severe headache.  You are vomiting (throwing up) so much you cannot keep fluids or medicines down.  You have confusion or seem unusually drowsy.  You have a seizure.    What can I do to help myself?  Fill any prescriptions the provider gave you and take them right away  If you have a fever, get plenty of rest and drink lots of fluids, especially water.  Using a humidifier or saline nose spray will also help loosen mucous.   Clothes or blankets will not change your fever. Do what is comfortable for you.  Bathing or sponging in lukewarm water may help you feel better.  Acetaminophen (Tylenol ) or ibuprofen (Advil , Motrin ) will help bring fever down and may help you feel more comfortable. Be  sure to read and follow the package directions, and ask your provider if you have questions.  Do not drink alcohol.  Decongestants may help you feel better. You may use decongestant nose sprays Afrin  (oxymetazoline) or Benjamin-Synephrine  (phenylephrine hydrochloride) for up to 3 days, or may use a decongestant tablet like Sudafed  (pseudoephedrine).  If you were given a prescription for medicine here today, be sure to read all of the information (including the package insert) that comes with your prescription.  This will include important information about the medicine, its side effects, and any warnings that you need to know about.  The pharmacist who fills the prescription can provide more information and answer questions you may have about the medicine.  If you have questions or concerns that the pharmacist cannot address, please call or return to the Emergency Department.   Remember that you can always come back to the Emergency Department if you are not able to see your regular provider in the amount of time listed above, if you get any new symptoms, or if there is anything that worries you.

## 2021-06-25 NOTE — PROGRESS NOTES
Progress Notes by Colt Bentley at 9/21/2017  2:40 PM     Author: Colt Bentley Service: -- Author Type: Medical Student    Filed: 9/21/2017  4:21 PM Encounter Date: 9/21/2017 Status: Attested    : Colt Bentley (Medical Student) Cosigner: Nicko Reyes MD at 9/21/2017  4:23 PM    Attestation signed by Nicko Reyes MD at 9/21/2017  4:23 PM    I saw Adriane Garrett with Colt Bentley MS3.  Patient with H pylori gastritis and duodenitis.  Has not started triple therapy yet.  She has a penicillin allergy.  As noted above we will treat with omeprazole twice daily, metronidazole and clarithromycin.  She will continue omeprazole for a total of 1 month.  She will then stop this and we will check a stool antigen in 2 months to demonstrate care.  I reviewed labs, endoscopy, we access care everywhere and reviewed GI notes.  We also reviewed his previous colonoscopy and CT scan.    Nicko Reyes                   Office Visit - Follow Up   Adriane Garrett   42 y.o. female    Date of Visit: 9/21/2017    Chief Complaint   Patient presents with   ? Cough     follow up        Assessment and Plan   1. Helicobacter pylori gastritis  She was diagnosed with H. Pylori on upper endoscopy on 9/15/17. She has been having chronic abdominal pain for over a year prior to this. On exam she has tenderness in the lower abdomen. She will be started on triple therapy with Omeprazole, Azithromycin, and Metronidazole due to her allergy to amoxicillin. She will then be rechecked in two months for eradication of the infection.  - H. pylori Antigen, Stool; Future    2. Cough  She has had a chronic cough for over one year. This has had extensive imaging evaluation and she will see a pulmonologist in October. She has tried using albuterol, tiotropium, and steroid inhalers, and while this has helped with her COPD and asthma, it has not helped with her cough. She no longer smokes, and currently uses an e-cig. She has never taken  a proton pump inhibitor in the past and will be using one as triple therapy for her h. Pylori. We will see if this helps her cough, which may possibly be due to reflex. She will then see the pulmonologist for further evaluation.      Return in about 2 months (around 11/21/2017) for recheck.     History of Present Illness   This 42 y.o. old woman who presents to clinic today for abdominal pain and cough. Both of these issues have been chronic, and lasting for over one year. Her cough has been persistant despite inhaler use. She reports that she has COPD and asthma, and with these she has shortness of breath, which the inhalers help with. Along with her persistent cough she has chest pain that is worse with coughing. She mentions that she has also tried over the counter cold medication for her cough without relief. She has had evaluation in the past with a chest x-ray as well as a chest CT scan. Additionally she has abdominal pain that has been present for a long time. With this she also has intermittent diarrhea with intermittent constipation as well. Her pain is located diffusely throughout her abdomen and she has not noted anything that makes it better. Adriane states that she has also had episodes of chills and diaphoresis. She has had a colonoscopy in the past and this did not show any findings. On 9/15/2017 she had an upper endoscopy which shows H. Pylori. She was told that this may be the cause of her abdominal pain. She was started on triple therapy for the infection, however she has yet to  the prescription for the pharmacy, she plans to do so this evening. She also mentions that when she called the pharmacy only the omeprazole prescription was filled, and she would like the antibiotics to be filled as well. She denies any weight change.     Review of Systems: A comprehensive review of systems was negative except as noted.     Medications, Allergies and Problem List   Reviewed and updated     Physical  Exam   General Appearance:   No acute distress    /76 (Patient Site: Right Arm, Patient Position: Sitting, Cuff Size: Adult Regular)  Pulse 76  Ht 5' (1.524 m)  Wt (!) 241 lb (109.3 kg)  SpO2 98%  BMI 47.07 kg/m2    Pupils are equal, neck is supple, normal TMs bilaterally, oropharynx is clear, no erythema, regular heart rate and rhythm, normal breath sounds bilaterally, abdomen is soft, she has tenderness with deep palpation in the lower quadrants, alert to person, place and time.      Additional Information   Current Outpatient Prescriptions   Medication Sig Dispense Refill   ? acetaminophen (TYLENOL) 500 MG tablet Take 500-1,000 mg by mouth every 6 (six) hours as needed. Maximum of 3000 mg per 24 hours.     ? albuterol (PROAIR HFA) 90 mcg/actuation inhaler Inhale 1-2 puffs every 4 (four) hours as needed for wheezing. 1 Inhaler 6   ? budesonide-formoterol (SYMBICORT) 160-4.5 mcg/actuation inhaler Inhale 2 puffs 2 (two) times a day. 1 Inhaler 11   ? diphenhydrAMINE (BENADRYL) 25 mg capsule Take 1-2 capsules (25-50 mg total) by mouth at bedtime as needed for sleep. 30 capsule 2   ? fluocinonide-emollient (FLUOCINONIDE-EMOLLIENT) 0.05 % Crea Apply 1 application topically 2 (two) times a day as needed. 60 g 5   ? fluticasone (FLONASE) 50 mcg/actuation nasal spray 2 sprays into each nostril daily. 16 g 11   ? ibuprofen (ADVIL,MOTRIN) 200 MG tablet Take 200-400 mg by mouth every 6 (six) hours as needed.     ? metoprolol tartrate (LOPRESSOR) 25 MG tablet Take 1 tablet (25 mg total) by mouth 2 (two) times a day. 60 tablet 3   ? tiotropium (SPIRIVA) 18 mcg inhalation capsule Place 1 capsule (2 puffs total) into inhaler and inhale daily. 90 capsule 3   ? clarithromycin (BIAXIN) 500 MG tablet Take 1 tablet (500 mg total) by mouth 2 (two) times a day for 14 days. 28 tablet 0   ? metroNIDAZOLE (FLAGYL) 500 MG tablet Take 1 tablet (500 mg total) by mouth 3 (three) times a day for 14 days. 42 tablet 0   ? omeprazole  (PRILOSEC) 20 MG capsule Take 1 capsule (20 mg total) by mouth 2 times a day at 6:00 am and 4:00 pm. 60 capsule 11     No current facility-administered medications for this visit.      Allergies   Allergen Reactions   ? Ampicillin    ? Ciprofloxacin    ? Lamotrigine Other (See Comments)     Tongue and throat itching   ? Penicillins    ? Ranitidine Other (See Comments)     Pt states it makes her feel like she wants to commit suicide   ? Varenicline      Annotation: suicidal     ? Aspirin Rash     Social History   Substance Use Topics   ? Smoking status: Former Smoker     Packs/day: 1.00     Types: Cigarettes     Quit date: 2/16/2015   ? Smokeless tobacco: Never Used      Comment: uses e cigarette 4/7/15   ? Alcohol use No          CEE Musa

## 2021-06-27 ENCOUNTER — HOSPITAL ENCOUNTER (EMERGENCY)
Facility: CLINIC | Age: 46
Discharge: HOME | End: 2021-06-27
Attending: PHYSICIAN ASSISTANT | Admitting: PHYSICIAN ASSISTANT

## 2021-06-27 ENCOUNTER — APPOINTMENT (OUTPATIENT)
Dept: CT IMAGING | Facility: CLINIC | Age: 46
End: 2021-06-27
Attending: PHYSICIAN ASSISTANT

## 2021-06-27 VITALS
DIASTOLIC BLOOD PRESSURE: 65 MMHG | RESPIRATION RATE: 18 BRPM | OXYGEN SATURATION: 98 % | SYSTOLIC BLOOD PRESSURE: 153 MMHG | WEIGHT: 230 LBS | HEART RATE: 63 BPM | BODY MASS INDEX: 45.16 KG/M2 | HEIGHT: 60 IN | TEMPERATURE: 98.4 F

## 2021-06-27 DIAGNOSIS — R19.7 NAUSEA, VOMITING, AND DIARRHEA: ICD-10-CM

## 2021-06-27 DIAGNOSIS — R11.2 NAUSEA, VOMITING, AND DIARRHEA: ICD-10-CM

## 2021-06-27 DIAGNOSIS — R10.31 RLQ ABDOMINAL PAIN: ICD-10-CM

## 2021-06-27 LAB
ALBUMIN SERPL-MCNC: 3.6 G/DL (ref 3.4–5)
ALBUMIN UR-MCNC: NEGATIVE MG/DL
ALP SERPL-CCNC: 90 U/L (ref 40–150)
ALT SERPL W P-5'-P-CCNC: 97 U/L (ref 0–50)
ANION GAP SERPL CALCULATED.3IONS-SCNC: 5 MMOL/L (ref 3–14)
APPEARANCE UR: CLEAR
AST SERPL W P-5'-P-CCNC: 67 U/L (ref 0–45)
BACTERIA #/AREA URNS HPF: ABNORMAL /HPF
BASOPHILS # BLD AUTO: 0 10E9/L (ref 0–0.2)
BASOPHILS NFR BLD AUTO: 0.5 %
BILIRUB DIRECT SERPL-MCNC: 0.1 MG/DL (ref 0–0.2)
BILIRUB SERPL-MCNC: 1 MG/DL (ref 0.2–1.3)
BILIRUB UR QL STRIP: NEGATIVE
BUN SERPL-MCNC: 8 MG/DL (ref 7–30)
CALCIUM SERPL-MCNC: 8.9 MG/DL (ref 8.5–10.1)
CHLORIDE SERPL-SCNC: 105 MMOL/L (ref 94–109)
CO2 SERPL-SCNC: 28 MMOL/L (ref 20–32)
COLOR UR AUTO: ABNORMAL
CREAT SERPL-MCNC: 0.61 MG/DL (ref 0.52–1.04)
DIFFERENTIAL METHOD BLD: NORMAL
EOSINOPHIL # BLD AUTO: 0.1 10E9/L (ref 0–0.7)
EOSINOPHIL NFR BLD AUTO: 1 %
ERYTHROCYTE [DISTWIDTH] IN BLOOD BY AUTOMATED COUNT: 13.2 % (ref 10–15)
GFR SERPL CREATININE-BSD FRML MDRD: >90 ML/MIN/{1.73_M2}
GLUCOSE SERPL-MCNC: 109 MG/DL (ref 70–99)
GLUCOSE UR STRIP-MCNC: NEGATIVE MG/DL
HCT VFR BLD AUTO: 38.7 % (ref 35–47)
HGB BLD-MCNC: 12.6 G/DL (ref 11.7–15.7)
HGB UR QL STRIP: ABNORMAL
IMM GRANULOCYTES # BLD: 0 10E9/L (ref 0–0.4)
IMM GRANULOCYTES NFR BLD: 0.2 %
KETONES UR STRIP-MCNC: NEGATIVE MG/DL
LEUKOCYTE ESTERASE UR QL STRIP: NEGATIVE
LIPASE SERPL-CCNC: 59 U/L (ref 73–393)
LYMPHOCYTES # BLD AUTO: 2.3 10E9/L (ref 0.8–5.3)
LYMPHOCYTES NFR BLD AUTO: 37.3 %
MCH RBC QN AUTO: 29.9 PG (ref 26.5–33)
MCHC RBC AUTO-ENTMCNC: 32.6 G/DL (ref 31.5–36.5)
MCV RBC AUTO: 92 FL (ref 78–100)
MONOCYTES # BLD AUTO: 0.4 10E9/L (ref 0–1.3)
MONOCYTES NFR BLD AUTO: 7 %
MUCOUS THREADS #/AREA URNS LPF: PRESENT /LPF
NEUTROPHILS # BLD AUTO: 3.3 10E9/L (ref 1.6–8.3)
NEUTROPHILS NFR BLD AUTO: 54 %
NITRATE UR QL: NEGATIVE
NRBC # BLD AUTO: 0 10*3/UL
NRBC BLD AUTO-RTO: 0 /100
PH UR STRIP: 5.5 PH (ref 5–7)
PLATELET # BLD AUTO: 347 10E9/L (ref 150–450)
POTASSIUM SERPL-SCNC: 3.4 MMOL/L (ref 3.4–5.3)
PROT SERPL-MCNC: 7.5 G/DL (ref 6.8–8.8)
RBC # BLD AUTO: 4.21 10E12/L (ref 3.8–5.2)
RBC #/AREA URNS AUTO: 1 /HPF (ref 0–2)
SODIUM SERPL-SCNC: 138 MMOL/L (ref 133–144)
SOURCE: ABNORMAL
SP GR UR STRIP: 1.01 (ref 1–1.03)
SQUAMOUS #/AREA URNS AUTO: 1 /HPF (ref 0–1)
UROBILINOGEN UR STRIP-MCNC: NORMAL MG/DL (ref 0–2)
WBC # BLD AUTO: 6.1 10E9/L (ref 4–11)
WBC #/AREA URNS AUTO: <1 /HPF (ref 0–5)

## 2021-06-27 PROCEDURE — 96361 HYDRATE IV INFUSION ADD-ON: CPT

## 2021-06-27 PROCEDURE — 80076 HEPATIC FUNCTION PANEL: CPT | Performed by: PHYSICIAN ASSISTANT

## 2021-06-27 PROCEDURE — 96375 TX/PRO/DX INJ NEW DRUG ADDON: CPT

## 2021-06-27 PROCEDURE — 99285 EMERGENCY DEPT VISIT HI MDM: CPT | Mod: 25

## 2021-06-27 PROCEDURE — 96376 TX/PRO/DX INJ SAME DRUG ADON: CPT

## 2021-06-27 PROCEDURE — 258N000003 HC RX IP 258 OP 636: Performed by: PHYSICIAN ASSISTANT

## 2021-06-27 PROCEDURE — 96374 THER/PROPH/DIAG INJ IV PUSH: CPT

## 2021-06-27 PROCEDURE — 250N000011 HC RX IP 250 OP 636: Performed by: PHYSICIAN ASSISTANT

## 2021-06-27 PROCEDURE — 85025 COMPLETE CBC W/AUTO DIFF WBC: CPT | Performed by: PHYSICIAN ASSISTANT

## 2021-06-27 PROCEDURE — 80048 BASIC METABOLIC PNL TOTAL CA: CPT | Performed by: PHYSICIAN ASSISTANT

## 2021-06-27 PROCEDURE — 250N000009 HC RX 250: Performed by: PHYSICIAN ASSISTANT

## 2021-06-27 PROCEDURE — 81001 URINALYSIS AUTO W/SCOPE: CPT | Performed by: PHYSICIAN ASSISTANT

## 2021-06-27 PROCEDURE — 36415 COLL VENOUS BLD VENIPUNCTURE: CPT

## 2021-06-27 PROCEDURE — 83690 ASSAY OF LIPASE: CPT | Performed by: PHYSICIAN ASSISTANT

## 2021-06-27 PROCEDURE — 74177 CT ABD & PELVIS W/CONTRAST: CPT

## 2021-06-27 RX ORDER — ONDANSETRON 2 MG/ML
4 INJECTION INTRAMUSCULAR; INTRAVENOUS EVERY 30 MIN PRN
Status: DISCONTINUED | OUTPATIENT
Start: 2021-06-27 | End: 2021-06-28 | Stop reason: HOSPADM

## 2021-06-27 RX ORDER — IOPAMIDOL 755 MG/ML
500 INJECTION, SOLUTION INTRAVASCULAR ONCE
Status: COMPLETED | OUTPATIENT
Start: 2021-06-27 | End: 2021-06-27

## 2021-06-27 RX ORDER — HYDROMORPHONE HYDROCHLORIDE 1 MG/ML
0.5 INJECTION, SOLUTION INTRAMUSCULAR; INTRAVENOUS; SUBCUTANEOUS
Status: DISCONTINUED | OUTPATIENT
Start: 2021-06-27 | End: 2021-06-28 | Stop reason: HOSPADM

## 2021-06-27 RX ORDER — ONDANSETRON 4 MG/1
4 TABLET, ORALLY DISINTEGRATING ORAL EVERY 8 HOURS PRN
Qty: 10 TABLET | Refills: 0 | Status: SHIPPED | OUTPATIENT
Start: 2021-06-27 | End: 2021-06-30

## 2021-06-27 RX ORDER — OXYCODONE HYDROCHLORIDE 5 MG/1
5 TABLET ORAL EVERY 6 HOURS PRN
Qty: 12 TABLET | Refills: 0 | Status: SHIPPED | OUTPATIENT
Start: 2021-06-27 | End: 2021-08-10

## 2021-06-27 RX ORDER — SODIUM CHLORIDE 9 MG/ML
INJECTION, SOLUTION INTRAVENOUS CONTINUOUS
Status: DISCONTINUED | OUTPATIENT
Start: 2021-06-27 | End: 2021-06-28 | Stop reason: HOSPADM

## 2021-06-27 RX ADMIN — HYDROMORPHONE HYDROCHLORIDE 0.5 MG: 1 INJECTION, SOLUTION INTRAMUSCULAR; INTRAVENOUS; SUBCUTANEOUS at 21:45

## 2021-06-27 RX ADMIN — HYDROMORPHONE HYDROCHLORIDE 0.5 MG: 1 INJECTION, SOLUTION INTRAMUSCULAR; INTRAVENOUS; SUBCUTANEOUS at 20:37

## 2021-06-27 RX ADMIN — SODIUM CHLORIDE 65 ML: 9 INJECTION, SOLUTION INTRAVENOUS at 21:59

## 2021-06-27 RX ADMIN — ONDANSETRON 4 MG: 2 INJECTION INTRAMUSCULAR; INTRAVENOUS at 20:37

## 2021-06-27 RX ADMIN — IOPAMIDOL 100 ML: 755 INJECTION, SOLUTION INTRAVENOUS at 21:59

## 2021-06-27 RX ADMIN — SODIUM CHLORIDE 1000 ML: 9 INJECTION, SOLUTION INTRAVENOUS at 20:34

## 2021-06-27 ASSESSMENT — MIFFLIN-ST. JEOR: SCORE: 1614.77

## 2021-06-27 ASSESSMENT — ENCOUNTER SYMPTOMS
DIARRHEA: 1
NAUSEA: 1
ABDOMINAL PAIN: 1
CHILLS: 1
VOMITING: 1

## 2021-06-27 NOTE — ED TRIAGE NOTES
Pt comes in for severe RLQ abd pain that she woke up with yesterday morning. Pain has been constant since then, feels like a stabbing twisting pain. Pt has been nauseates, has vomited twice. Pt has diarrhea as well since 8 am today. Pt denies urinary symptoms.

## 2021-06-28 NOTE — ED PROVIDER NOTES
History     Chief Complaint:  Abdominal Pain       HPI   Adriane Garrett is a 45 year old female who presents to the ED for evaluation of abdominal pain.  The patient reports waking up with RLQ abdominal pain beginning at 0700 yesterday morning.  Pain is nonradiating.  She describes it as a stabbing sensation.  She notes that its waxing and waning, worse with movement.  She reports associated chills, nausea, vomiting x2, and diarrhea.  She denies any chest pain, dyspnea, black or bloody stool, urinary symptoms.    Allergies:  Ampicillin  Aspirin  Chantix [Varenicline]  Ciprofloxacin  Penicillins  Tessalon [Benzonatate]     Medications:    albuterol (PROAIR HFA/PROVENTIL HFA/VENTOLIN HFA) 108 (90 Base) MCG/ACT inhaler  benzonatate (TESSALON) 200 MG capsule  famotidine (PEPCID) 20 MG tablet  metoclopramide (REGLAN) 10 MG tablet  oxyCODONE (ROXICODONE) 5 MG tablet        Past Medical History:    Past Medical History:   Diagnosis Date     IBS (irritable bowel syndrome)        Patient Active Problem List    Diagnosis Date Noted     Vitamin D deficiency 04/24/2021     Priority: Medium     Formatting of this note might be different from the original.      Replacement Utility updated for latest IMO load       Other chronic nonalcoholic liver disease 04/24/2021     Priority: Medium     Morbid obesity (H) 04/24/2021     Priority: Medium     Formatting of this note might be different from the original.       Migraine headache 04/24/2021     Priority: Medium     Formatting of this note might be different from the original.  negative mris, negative spinal taps.  Improved with reduction of mountain dew from 12 to 2.     Replacement Utility updated for latest IMO load       Hypertension 04/24/2021     Priority: Medium     Formatting of this note might be different from the original.      Replacement Utility updated for latest IMO load       Hyperlipidemia 04/24/2021     Priority: Medium     Formatting of this note might be  different from the original.       Irritable bowel syndrome 06/26/2019     Priority: Medium     Epigastric pain 04/01/2019     Priority: Medium     Pancreatitis 12/12/2018     Priority: Medium     Bipolar 1 disorder (H) 12/26/2014     Priority: Medium     Asthma, mild intermittent 10/13/2010     Priority: Medium     Smoker 06/10/2010     Priority: Medium     GERD (gastroesophageal reflux disease) 01/31/2010     Priority: Medium        Past Surgical History:    Past Surgical History:   Procedure Laterality Date     GI SURGERY      cholecystectomy     GYN SURGERY      hysterectomy and tubal ligation      Social History:   does not have a smoking history on file. She uses smokeless tobacco. She reports current alcohol use. She reports previous drug use.    PCP: No Ref-Primary, Physician     Review of Systems   Constitutional: Positive for chills.   Gastrointestinal: Positive for abdominal pain, diarrhea, nausea and vomiting.   All other systems reviewed and are negative.        Physical Exam     Patient Vitals for the past 24 hrs:   BP Temp Temp src Pulse Resp SpO2 Height Weight   06/27/21 2225 (!) 153/65 -- -- 63 -- 98 % -- --   06/27/21 2100 (!) 150/66 -- -- 58 -- 95 % -- --   06/27/21 1901 -- 98.4  F (36.9  C) Temporal -- -- -- -- --   06/27/21 1859 (!) 167/94 -- Temporal 67 18 97 % 1.524 m (5') 104.3 kg (230 lb)        Physical Exam  Constitutional: Pleasant. Cooperative.   Eyes: Pupils equally round and reactive  HENT: Head is normal in appearance. Oropharynx is normal with moist mucus membranes.  Cardiovascular: Regular rate and rhythm and without murmurs.  Respiratory: Normal respiratory effort, lungs are clear bilaterally.  GI: TTP to RLQ, otherwise non-tender, soft, non-distended. No guarding, rebound, or rigidity.  Musculoskeletal: No asymmetry of the lower extremities, no tenderness to palpation.   Skin: Normal, without rash.  Neurologic: Cranial nerves grossly intact, normal cognition, no focal deficits.  Alert and oriented x 3.   Psychiatric: Normal affect.  Nursing notes and vital signs reviewed.    Emergency Department Course     Imaging:  CT Abdomen Pelvis w Contrast   Final Result   IMPRESSION:    1.  No appendicitis or other bowel inflammation or obstruction.   2.  Increased biliary dilatation into the pancreas head without cause of obstruction seen. The gallbladder is surgically absent.   3.  Fatty infiltration of the liver.           Laboratory:  Labs Ordered and Resulted from Time of ED Arrival Up to the Time of Departure from the ED   ROUTINE UA WITH MICROSCOPIC - Abnormal; Notable for the following components:       Result Value    Blood Urine Trace (*)     Bacteria Urine Few (*)     Mucous Urine Present (*)     All other components within normal limits   BASIC METABOLIC PANEL - Abnormal; Notable for the following components:    Glucose 109 (*)     All other components within normal limits   HEPATIC PANEL - Abnormal; Notable for the following components:    ALT 97 (*)     AST 67 (*)     All other components within normal limits   LIPASE - Abnormal; Notable for the following components:    Lipase 59 (*)     All other components within normal limits   CBC WITH PLATELETS DIFFERENTIAL   PERIPHERAL IV CATHETER      Interventions:  Medications   ondansetron (ZOFRAN) injection 4 mg (4 mg Intravenous Given 6/27/21 2037)   HYDROmorphone (PF) (DILAUDID) injection 0.5 mg (0.5 mg Intravenous Given 6/27/21 2145)   0.9% sodium chloride BOLUS (0 mLs Intravenous Stopped 6/27/21 2230)     Followed by   sodium chloride 0.9% infusion (has no administration in time range)   iopamidol (ISOVUE-370) solution 500 mL (100 mLs Intravenous Given 6/27/21 2159)   sodium chloride 0.9 % bag 500mL for CT scan flush use (65 mLs Intravenous Given 6/27/21 2159)        Emergency Department Course:  Past medical records, nursing notes, and vitals reviewed.  I performed an exam of the patient and obtained history, as documented above.  I  ordered the above labs and imaging.  I ordered the above interventions.  Discussed results with patient.  Patient discharged home.    Impression & Plan      Medical Decision Making:  Adriane Garrett is a 45 year old female who presents to the ED for evaluation of abdominal pain, nausea, vomiting, and diarrhea.  See HPI as above for additional details.  Vitals and physical exam as above.  Differential was broad and included appendicitis, diverticulitis, hernia, UTI, perforated viscus, SBO, gastroenteritis, pyelonephritis, kidney stone, among others.  Work-up obtained as above.  Discussed with patient unclear etiology of her symptoms at this time.  No evidence for nefarious pathology based upon above work-up.  [place] patient was safe for discharge to home with close outpatient follow-up.  Will provide a short course of the below medications for home.  Discussed narcotic precautions. Discussed reasons to return. All questions answered. Patient discharged to home in stable condition.    Diagnosis:    ICD-10-CM    1. RLQ abdominal pain  R10.31    2. Nausea, vomiting, and diarrhea  R11.2     R19.7         Discharge Medications:     Medication List      Started    ondansetron 4 MG ODT tab  Commonly known as: Zofran ODT  4 mg, Oral, EVERY 8 HOURS PRN        Modified    oxyCODONE 5 MG tablet  Commonly known as: ROXICODONE  5 mg, Oral, EVERY 6 HOURS PRN  What changed: reasons to take this             6/27/2021   Gerardo Salas PA-C     This record was created at least in part using electronic voice recognition software, so please excuse any typographical errors.         Gerardo Salas PA-C  06/27/21 0702

## 2021-08-10 ENCOUNTER — APPOINTMENT (OUTPATIENT)
Dept: ULTRASOUND IMAGING | Facility: CLINIC | Age: 46
End: 2021-08-10
Attending: EMERGENCY MEDICINE

## 2021-08-10 ENCOUNTER — HOSPITAL ENCOUNTER (EMERGENCY)
Facility: CLINIC | Age: 46
Discharge: HOME | End: 2021-08-10
Attending: EMERGENCY MEDICINE | Admitting: EMERGENCY MEDICINE

## 2021-08-10 VITALS
HEIGHT: 60 IN | OXYGEN SATURATION: 100 % | DIASTOLIC BLOOD PRESSURE: 77 MMHG | TEMPERATURE: 97.3 F | WEIGHT: 230 LBS | RESPIRATION RATE: 18 BRPM | BODY MASS INDEX: 45.16 KG/M2 | HEART RATE: 53 BPM | SYSTOLIC BLOOD PRESSURE: 156 MMHG

## 2021-08-10 DIAGNOSIS — R10.11 ABDOMINAL PAIN, RIGHT UPPER QUADRANT: ICD-10-CM

## 2021-08-10 LAB
ALBUMIN SERPL-MCNC: 4 G/DL (ref 3.4–5)
ALBUMIN UR-MCNC: 10 MG/DL
ALP SERPL-CCNC: 88 U/L (ref 40–150)
ALT SERPL W P-5'-P-CCNC: 71 U/L (ref 0–50)
ANION GAP SERPL CALCULATED.3IONS-SCNC: 5 MMOL/L (ref 3–14)
APPEARANCE UR: CLEAR
AST SERPL W P-5'-P-CCNC: 41 U/L (ref 0–45)
BASOPHILS # BLD AUTO: 0 10E3/UL (ref 0–0.2)
BASOPHILS NFR BLD AUTO: 1 %
BILIRUB DIRECT SERPL-MCNC: <0.1 MG/DL (ref 0–0.2)
BILIRUB SERPL-MCNC: 0.4 MG/DL (ref 0.2–1.3)
BILIRUB UR QL STRIP: NEGATIVE
BUN SERPL-MCNC: 10 MG/DL (ref 7–30)
CALCIUM SERPL-MCNC: 9.2 MG/DL (ref 8.5–10.1)
CHLORIDE BLD-SCNC: 107 MMOL/L (ref 94–109)
CO2 SERPL-SCNC: 27 MMOL/L (ref 20–32)
COLOR UR AUTO: YELLOW
CREAT SERPL-MCNC: 0.68 MG/DL (ref 0.52–1.04)
EOSINOPHIL # BLD AUTO: 0.1 10E3/UL (ref 0–0.7)
EOSINOPHIL NFR BLD AUTO: 2 %
ERYTHROCYTE [DISTWIDTH] IN BLOOD BY AUTOMATED COUNT: 12.9 % (ref 10–15)
GFR SERPL CREATININE-BSD FRML MDRD: >90 ML/MIN/1.73M2
GLUCOSE BLD-MCNC: 91 MG/DL (ref 70–99)
GLUCOSE UR STRIP-MCNC: NEGATIVE MG/DL
HCT VFR BLD AUTO: 43.4 % (ref 35–47)
HGB BLD-MCNC: 13.7 G/DL (ref 11.7–15.7)
HGB UR QL STRIP: NEGATIVE
IMM GRANULOCYTES # BLD: 0 10E3/UL
IMM GRANULOCYTES NFR BLD: 0 %
KETONES UR STRIP-MCNC: NEGATIVE MG/DL
LEUKOCYTE ESTERASE UR QL STRIP: NEGATIVE
LIPASE SERPL-CCNC: 47 U/L (ref 73–393)
LYMPHOCYTES # BLD AUTO: 2.8 10E3/UL (ref 0.8–5.3)
LYMPHOCYTES NFR BLD AUTO: 44 %
MCH RBC QN AUTO: 30 PG (ref 26.5–33)
MCHC RBC AUTO-ENTMCNC: 31.6 G/DL (ref 31.5–36.5)
MCV RBC AUTO: 95 FL (ref 78–100)
MONOCYTES # BLD AUTO: 0.5 10E3/UL (ref 0–1.3)
MONOCYTES NFR BLD AUTO: 7 %
MUCOUS THREADS #/AREA URNS LPF: PRESENT /LPF
NEUTROPHILS # BLD AUTO: 2.9 10E3/UL (ref 1.6–8.3)
NEUTROPHILS NFR BLD AUTO: 46 %
NITRATE UR QL: NEGATIVE
NRBC # BLD AUTO: 0 10E3/UL
NRBC BLD AUTO-RTO: 0 /100
PH UR STRIP: 5.5 [PH] (ref 5–7)
PLATELET # BLD AUTO: 341 10E3/UL (ref 150–450)
POTASSIUM BLD-SCNC: 3.9 MMOL/L (ref 3.4–5.3)
PROT SERPL-MCNC: 7.7 G/DL (ref 6.8–8.8)
RBC # BLD AUTO: 4.56 10E6/UL (ref 3.8–5.2)
RBC URINE: 1 /HPF
SODIUM SERPL-SCNC: 139 MMOL/L (ref 133–144)
SP GR UR STRIP: 1.02 (ref 1–1.03)
SQUAMOUS EPITHELIAL: 4 /HPF
UROBILINOGEN UR STRIP-MCNC: NORMAL MG/DL
WBC # BLD AUTO: 6.3 10E3/UL (ref 4–11)
WBC URINE: 2 /HPF

## 2021-08-10 PROCEDURE — 258N000003 HC RX IP 258 OP 636: Performed by: EMERGENCY MEDICINE

## 2021-08-10 PROCEDURE — 83690 ASSAY OF LIPASE: CPT | Performed by: EMERGENCY MEDICINE

## 2021-08-10 PROCEDURE — 250N000013 HC RX MED GY IP 250 OP 250 PS 637: Performed by: EMERGENCY MEDICINE

## 2021-08-10 PROCEDURE — 82248 BILIRUBIN DIRECT: CPT | Performed by: EMERGENCY MEDICINE

## 2021-08-10 PROCEDURE — 81001 URINALYSIS AUTO W/SCOPE: CPT | Performed by: EMERGENCY MEDICINE

## 2021-08-10 PROCEDURE — 250N000011 HC RX IP 250 OP 636: Performed by: EMERGENCY MEDICINE

## 2021-08-10 PROCEDURE — 80048 BASIC METABOLIC PNL TOTAL CA: CPT | Performed by: EMERGENCY MEDICINE

## 2021-08-10 PROCEDURE — 96361 HYDRATE IV INFUSION ADD-ON: CPT

## 2021-08-10 PROCEDURE — 36415 COLL VENOUS BLD VENIPUNCTURE: CPT | Performed by: EMERGENCY MEDICINE

## 2021-08-10 PROCEDURE — 96374 THER/PROPH/DIAG INJ IV PUSH: CPT

## 2021-08-10 PROCEDURE — 99285 EMERGENCY DEPT VISIT HI MDM: CPT | Mod: 25

## 2021-08-10 PROCEDURE — 76705 ECHO EXAM OF ABDOMEN: CPT

## 2021-08-10 PROCEDURE — 85025 COMPLETE CBC W/AUTO DIFF WBC: CPT | Performed by: EMERGENCY MEDICINE

## 2021-08-10 RX ORDER — OXYCODONE HYDROCHLORIDE 5 MG/1
5 TABLET ORAL ONCE
Status: COMPLETED | OUTPATIENT
Start: 2021-08-10 | End: 2021-08-10

## 2021-08-10 RX ORDER — ONDANSETRON 2 MG/ML
4 INJECTION INTRAMUSCULAR; INTRAVENOUS
Status: COMPLETED | OUTPATIENT
Start: 2021-08-10 | End: 2021-08-10

## 2021-08-10 RX ADMIN — SODIUM CHLORIDE 1000 ML: 9 INJECTION, SOLUTION INTRAVENOUS at 16:44

## 2021-08-10 RX ADMIN — ONDANSETRON 4 MG: 2 INJECTION INTRAMUSCULAR; INTRAVENOUS at 16:44

## 2021-08-10 RX ADMIN — OXYCODONE HYDROCHLORIDE 5 MG: 5 TABLET ORAL at 16:45

## 2021-08-10 RX ADMIN — OXYCODONE HYDROCHLORIDE 5 MG: 5 TABLET ORAL at 18:09

## 2021-08-10 ASSESSMENT — ENCOUNTER SYMPTOMS
SHORTNESS OF BREATH: 0
CHILLS: 0
NAUSEA: 1
VOMITING: 0
ABDOMINAL PAIN: 1
FEVER: 0
DYSURIA: 0
CONSTIPATION: 0
COLOR CHANGE: 0
COUGH: 0
DIARRHEA: 0
BACK PAIN: 1

## 2021-08-10 ASSESSMENT — MIFFLIN-ST. JEOR: SCORE: 1614.77

## 2021-08-10 NOTE — ED PROVIDER NOTES
History   Chief Complaint:  Abdominal Pain     The history is provided by the patient.      Adriane Garrett is a 45 year old female with history of IBS, post cholecystectomy syndrome, pancreatitis, and hypertension who presents with abdominal pain. The patient reports that at 0800 last night, she began to experience upper right abdominal pain that radiates into her back. The pain is constant and worsens with movement. She has not eaten anything but does say that fluids increase the pain. She rates the pain as an 8/10 in severity and says tylenol has not helped today. She has had this type of pain before and has been seen multiple times without diagnosis. The last episode was in June. She did have her gallbladder removed in 2004 but notes that this pain feels similar to when her gallbladder was inflamed. She denies any fever, chills, shortness of breath, cough, congestion, dysuria, constipation, or diarrhea. She does report nausea but denies vomiting. She denies rash and jaundice as well. She denies any tobacco or drug use and rarely drinks alcohol. She is sexually active .    Review of Systems   Constitutional: Negative for chills and fever.   HENT: Negative for congestion.    Respiratory: Negative for cough and shortness of breath.    Gastrointestinal: Positive for abdominal pain and nausea. Negative for constipation, diarrhea and vomiting.   Genitourinary: Negative for dysuria.   Musculoskeletal: Positive for back pain.   Skin: Negative for color change and rash.   All other systems reviewed and are negative.    Allergies:  Citalopram  Quinolones  Varenicline  Adhesives  Ampicillin  Aspirin  Chantix  Ciprofloxacin  Penicillins  Tessalon    Medications:  Tessalon  Pepcid  Reglan    Past Medical History:    IBS  Liver disease  Morbid obesity  Migraine  Hypertension  Hyperlipidemia  Pancreatitis  Bipolar disorder  Asthma  GERD  Manic depression  Ovarian cyst  Darier disease  Pneumonia  Sinus bradycardia  Oral  candidiasis  Bronchitis  Narcotic dependence  Endometriosis  Dysplastic nevus  Post cholecystectomy syndrome  Sialoadenitis  Sebaceous cyst  Hypomagnesemia  Gingivitis  Skin abscess  Furuncle  Herpes zoster  COPD     Past Surgical History:    Cholecystectomy  Hysterectomy and tubal ligation  Subtalar arthroereisis  Myringotomy with tube placement  EGD with biopsy  Oophorectomy laparotomy    Family History:    Alcohol abuse, mother  Diabetes, mother/sister  Heart disease, brother  Coronary stenting, brother  Aneurysm, father  Asthma, sister    Social History:  Smoking status: never   Alcohol use: rarely  Drug use: no  PCP: Anabelama Sheldon  Presents to the ED alone.    Physical Exam     Patient Vitals for the past 24 hrs:   BP Temp Pulse Resp SpO2 Height Weight   08/10/21 1410 (!) 164/78 -- -- -- 95 % -- --   08/10/21 1408 -- 97.3  F (36.3  C) 66 16 -- 1.524 m (5') 104.3 kg (230 lb)       Physical Exam    General: Patient is alert and interactive when I enter the room  Head:  The scalp, face, and head appear normal  Eyes:  Conjunctivae are normal  ENT:    The nose is normal    Pinnae are normal    External acoustic canals are normal  Neck:  Trachea midline  CV:  Pulses are tai   Resp:  No respiratory distress   Abdomen:      Soft, RUQ tenderness, no guarding, non-distended  Musc:  Normal muscular tone    No major joint effusions    No asymmetric leg swelling  Skin:  No rash or lesions noted  Neuro:  Speech is normal and fluent. Face is symmetric.     Moving all extremities well.   Psych: Awake. Alert.  Normal affect.  Appropriate interactions.     Emergency Department Course     Imaging:  Abdomen US Limited (RUQ Only):   1.  Common hepatic duct slightly less prominent than on prior. No intrahepatic biliary dilatation. Probable reservoir effect.   2.  Hepatic steatosis.     Reading per radiology    Laboratory:  CBC: WBC 6.3, HGB 13.7,    BMP: All WNL (Creatinine: 0.68)    UA: Protein Albumin: 10(A), Mucus:  Present(A), Squamous Epithelial: 4(H), o/w Negative  Lipase: 47(L)    Emergency Department Course:  Reviewed:  I reviewed the patient's nursing notes, vitals, past medical records, Care Everywhere.     Assessments:  1620 I performed an assessment and examination of the patient as noted above.      1728 Findings and plan explained to the Patient. Patient discharged home with instructions regarding supportive care, medications, and reasons to return. The importance of close follow-up was reviewed.     Interventions:  1644 NS 1L IV Bolus   1644 Zofran 4 mg, IV  1645 Roxicodone 5 mg, PO  1809 Roxicodone 5 mg, PO    Disposition:  The patient was discharged to home.     Impression & Plan   Medical Decision Making:  Adriane Garrett is a 45 year old female with a history of obesity and bipolar disorder Zen with right upper quadrant pain.  Patient has been seen here multiple times for similar pain.  She admits that she has had this pain in the past and has had a negative work-up including a GI evaluation with endoscopy.  She is not taking any antacids.  She does have right upper quadrant tenderness.  She is already had her gallbladder removed.  Blood work today was completely unremarkable except for mild elevation of ALT which has been elevated previously. US was unremarkable. I think we should start her on a PPI as there some relation to food. Patient should follow-up with a PCP which she does not have.     Diagnosis:    ICD-10-CM    1. Abdominal pain, right upper quadrant  R10.11        Discharge Medications:  New Prescriptions    OMEPRAZOLE (PRILOSEC) 20 MG DR CAPSULE    Take 1 capsule (20 mg) by mouth daily     Scribe Disclosure:  I, Liss Valdes, am serving as a scribe at 4:20 PM on 8/10/2021 to document services personally performed by Genevieve De La Garza MD based on my observations and the provider's statements to me.       Genevieve De La Garza MD  08/11/21 8579

## 2021-08-10 NOTE — ED TRIAGE NOTES
Pt presents for complaint of RUQ abdominal pain that started yesterday evening. Pt states she is nauseated with the pain. Hx of gallbladder surgery in the past. Also states mild diarrhea. ABC intact, A&Ox4.

## 2021-08-11 ENCOUNTER — TELEPHONE (OUTPATIENT)
Dept: EMERGENCY MEDICINE | Facility: CLINIC | Age: 46
End: 2021-08-11

## 2021-08-11 NOTE — TELEPHONE ENCOUNTER
[facility] Emergency Department/Urgent Care Lab result notification:    Reason for call  Notify of lab results, assess symptoms,  review ED providers recommendations (if necessary) and advise per ED lab result f/u protocol.    Lab result  Abnormal Result. Hepatic panel  Final ALT level is 71 and this level is [elevated].  Normal reference range is 0 - 50   Resulted after [facility] Emergency Dept visit on this date 8/10/21.  RN to notify patient/parent of result and advise to relay result to their PCP immediately.      11:20AM: Left voicemail message requesting a call back to [phone] between 9 a.m. and 5:30 p.m. for patient's ED/UC lab results.      Sofia Dixon RN  [facility] Result RN  [facility] Emergency Dept Lab Result RN  # [phone]

## 2021-08-26 ENCOUNTER — TRANSFERRED RECORDS (OUTPATIENT)
Dept: HEALTH INFORMATION MANAGEMENT | Facility: CLINIC | Age: 46
End: 2021-08-26

## 2021-08-26 LAB
ALT SERPL-CCNC: 54 IU/L
CREATININE (EXTERNAL): 0.76 MG/DL (ref 0.5–1)
GLUCOSE (EXTERNAL): 101 MG/DL (ref 70–100)
POTASSIUM (EXTERNAL): 3.6 MEQ/L (ref 3.5–5.3)

## 2021-09-26 ENCOUNTER — HEALTH MAINTENANCE LETTER (OUTPATIENT)
Age: 46
End: 2021-09-26

## 2021-10-06 ENCOUNTER — HOSPITAL ENCOUNTER (EMERGENCY)
Facility: CLINIC | Age: 46
Discharge: HOME | End: 2021-10-06
Attending: NURSE PRACTITIONER | Admitting: NURSE PRACTITIONER

## 2021-10-06 ENCOUNTER — APPOINTMENT (OUTPATIENT)
Dept: CT IMAGING | Facility: CLINIC | Age: 46
End: 2021-10-06
Attending: NURSE PRACTITIONER

## 2021-10-06 VITALS
TEMPERATURE: 97.6 F | RESPIRATION RATE: 19 BRPM | OXYGEN SATURATION: 100 % | DIASTOLIC BLOOD PRESSURE: 85 MMHG | HEART RATE: 58 BPM | SYSTOLIC BLOOD PRESSURE: 145 MMHG

## 2021-10-06 DIAGNOSIS — R10.13 EPIGASTRIC PAIN: ICD-10-CM

## 2021-10-06 LAB
ALBUMIN UR-MCNC: NEGATIVE MG/DL
AMORPH CRY #/AREA URNS HPF: ABNORMAL /HPF
ANION GAP SERPL CALCULATED.3IONS-SCNC: 4 MMOL/L (ref 3–14)
APPEARANCE UR: ABNORMAL
BASOPHILS # BLD AUTO: 0 10E3/UL (ref 0–0.2)
BASOPHILS NFR BLD AUTO: 0 %
BILIRUB UR QL STRIP: NEGATIVE
BUN SERPL-MCNC: 11 MG/DL (ref 7–30)
CALCIUM SERPL-MCNC: 8.9 MG/DL (ref 8.5–10.1)
CHLORIDE BLD-SCNC: 107 MMOL/L (ref 94–109)
CO2 SERPL-SCNC: 31 MMOL/L (ref 20–32)
COLOR UR AUTO: YELLOW
CREAT SERPL-MCNC: 0.6 MG/DL (ref 0.52–1.04)
EOSINOPHIL # BLD AUTO: 0.1 10E3/UL (ref 0–0.7)
EOSINOPHIL NFR BLD AUTO: 1 %
ERYTHROCYTE [DISTWIDTH] IN BLOOD BY AUTOMATED COUNT: 13.1 % (ref 10–15)
GFR SERPL CREATININE-BSD FRML MDRD: >90 ML/MIN/1.73M2
GLUCOSE BLD-MCNC: 128 MG/DL (ref 70–99)
GLUCOSE UR STRIP-MCNC: NEGATIVE MG/DL
HCT VFR BLD AUTO: 40.4 % (ref 35–47)
HGB BLD-MCNC: 13 G/DL (ref 11.7–15.7)
HGB UR QL STRIP: NEGATIVE
HOLD SPECIMEN: NORMAL
IMM GRANULOCYTES # BLD: 0 10E3/UL
IMM GRANULOCYTES NFR BLD: 0 %
KETONES UR STRIP-MCNC: NEGATIVE MG/DL
LEUKOCYTE ESTERASE UR QL STRIP: NEGATIVE
LIPASE SERPL-CCNC: 73 U/L (ref 73–393)
LYMPHOCYTES # BLD AUTO: 2.6 10E3/UL (ref 0.8–5.3)
LYMPHOCYTES NFR BLD AUTO: 49 %
MCH RBC QN AUTO: 30.4 PG (ref 26.5–33)
MCHC RBC AUTO-ENTMCNC: 32.2 G/DL (ref 31.5–36.5)
MCV RBC AUTO: 94 FL (ref 78–100)
MONOCYTES # BLD AUTO: 0.3 10E3/UL (ref 0–1.3)
MONOCYTES NFR BLD AUTO: 7 %
MUCOUS THREADS #/AREA URNS LPF: PRESENT /LPF
NEUTROPHILS # BLD AUTO: 2.2 10E3/UL (ref 1.6–8.3)
NEUTROPHILS NFR BLD AUTO: 43 %
NITRATE UR QL: NEGATIVE
NRBC # BLD AUTO: 0 10E3/UL
NRBC BLD AUTO-RTO: 0 /100
PH UR STRIP: 7 [PH] (ref 5–7)
PLATELET # BLD AUTO: 327 10E3/UL (ref 150–450)
POTASSIUM BLD-SCNC: 3.8 MMOL/L (ref 3.4–5.3)
RBC # BLD AUTO: 4.28 10E6/UL (ref 3.8–5.2)
RBC URINE: 0 /HPF
SODIUM SERPL-SCNC: 142 MMOL/L (ref 133–144)
SP GR UR STRIP: 1.02 (ref 1–1.03)
SQUAMOUS EPITHELIAL: 3 /HPF
UROBILINOGEN UR STRIP-MCNC: NORMAL MG/DL
WBC # BLD AUTO: 5.2 10E3/UL (ref 4–11)
WBC URINE: 0 /HPF

## 2021-10-06 PROCEDURE — 250N000013 HC RX MED GY IP 250 OP 250 PS 637: Performed by: NURSE PRACTITIONER

## 2021-10-06 PROCEDURE — 83690 ASSAY OF LIPASE: CPT | Performed by: NURSE PRACTITIONER

## 2021-10-06 PROCEDURE — 99284 EMERGENCY DEPT VISIT MOD MDM: CPT | Mod: 25

## 2021-10-06 PROCEDURE — 83690 ASSAY OF LIPASE: CPT | Performed by: EMERGENCY MEDICINE

## 2021-10-06 PROCEDURE — 85025 COMPLETE CBC W/AUTO DIFF WBC: CPT | Performed by: EMERGENCY MEDICINE

## 2021-10-06 PROCEDURE — 85025 COMPLETE CBC W/AUTO DIFF WBC: CPT | Performed by: NURSE PRACTITIONER

## 2021-10-06 PROCEDURE — 80048 BASIC METABOLIC PNL TOTAL CA: CPT | Performed by: NURSE PRACTITIONER

## 2021-10-06 PROCEDURE — 250N000009 HC RX 250: Performed by: NURSE PRACTITIONER

## 2021-10-06 PROCEDURE — 81001 URINALYSIS AUTO W/SCOPE: CPT | Performed by: EMERGENCY MEDICINE

## 2021-10-06 PROCEDURE — 74176 CT ABD & PELVIS W/O CONTRAST: CPT

## 2021-10-06 PROCEDURE — 36415 COLL VENOUS BLD VENIPUNCTURE: CPT | Performed by: EMERGENCY MEDICINE

## 2021-10-06 PROCEDURE — 80048 BASIC METABOLIC PNL TOTAL CA: CPT | Performed by: EMERGENCY MEDICINE

## 2021-10-06 PROCEDURE — 81001 URINALYSIS AUTO W/SCOPE: CPT | Performed by: NURSE PRACTITIONER

## 2021-10-06 RX ORDER — SUCRALFATE ORAL 1 G/10ML
1 SUSPENSION ORAL 4 TIMES DAILY
Qty: 280 ML | Refills: 0 | Status: SHIPPED | OUTPATIENT
Start: 2021-10-06 | End: 2021-10-13

## 2021-10-06 RX ADMIN — LIDOCAINE HYDROCHLORIDE 30 ML: 20 SOLUTION ORAL; TOPICAL at 18:17

## 2021-10-06 ASSESSMENT — ENCOUNTER SYMPTOMS
DIARRHEA: 1
SHORTNESS OF BREATH: 0
FEVER: 0
CHILLS: 0
NAUSEA: 1
ABDOMINAL PAIN: 1
DYSURIA: 0

## 2021-10-06 NOTE — ED TRIAGE NOTES
Presents with RUQ abdominal pain, nausea and diarrhea since 2100 last night. Reports hx of cholecystectomy and recurrent pancreatitis which she states this feels like. VSS on RA.

## 2021-10-06 NOTE — ED PROVIDER NOTES
History   Chief Complaint:  Abdominal Pain, Nausea, and Diarrhea       The history is provided by the patient.      Adriane Garrett is a 46 year old female with history of IBS, pancreatitis, hypertension, and GERD who presents with abdominal pain. Last night around 2100, the patient developed right upper quadrant abdominal pain, nausea, and diarrhea after eating. When she ate this morning, her pain was worsening. Her pain feels similar to her past pancreatitis. She took ibuprofen around 0800 this morning.     Review of Systems   Constitutional: Negative for chills and fever.   Respiratory: Negative for shortness of breath.    Cardiovascular: Negative for chest pain.   Gastrointestinal: Positive for abdominal pain (RUQ), diarrhea and nausea.   Genitourinary: Negative for dysuria.   All other systems reviewed and are negative.    Allergies:  Ampicillin  Aspirin  Chantix [Varenicline]  Ciprofloxacin  Penicillins  Tessalon [Benzonatate]    Medications:  Albuterol inhaler  Zantac  Zofran   Imodium    Past Medical History:     IBS  Vitamin D deficiency   Pancreatitis   Chronic nonalcoholic liver disease  Morbid obesity  Migraine  Hypertension  Hyperlipidemia  GERD  Bipolar 1 disorder  Asthma      Past Surgical History:    Cholecystectomy  Hysterectomy and tubal ligation  Bilateral Salpingo-Oophorectomy      Family History:    Mother: diabetes  Sister: diabetes  Brother: heart disease, coronary stenting   Father: aneurysm     Social History:  The patient presents to the ED alone. She uses E-cigarettes but rarely drinks.     Physical Exam     Patient Vitals for the past 24 hrs:   BP Temp Temp src Pulse Resp SpO2   10/06/21 1502 (!) 145/85 -- -- -- -- --   10/06/21 1500 -- 97.6  F (36.4  C) Temporal 58 19 100 %       Physical Exam  General: Alert, No obvious discomfort, well kept, morbidly obese  Eyes: PERRL, conjunctivae pink no scleral icterus or conjunctival injection  ENT:   Moist mucus membranes, posterior oropharynx  clear without erythema or exudates, No lymphadenopathy, Normal voice  Resp:  Lungs clear to auscultation bilaterally, no crackles/rubs/wheezes. Good air movement  CV:  Normal rate and rhythm, no murmurs/rubs/gallops  GI:  Abdomen soft and non-distended.  Normoactive BS.  Epigastric tenderness,  No guarding or rebound, No masses  Skin:  Warm, dry.  No rashes or petechiae  Musculoskeletal: No peripheral edema or calf tenderness, Normal gross ROM   Neuro: Alert and oriented to person/place/time, normal sensation  Psychiatric: Normal affect, cooperative, good eye contact    Emergency Department Course   Imaging:  CT Abdomen Pelvis w/o Contrast  1.  Cholecystectomy. Decreased bile duct dilatation which now appears normal on this unenhanced exam.     2.  No urinary calculi or hydronephrosis.     3.  Normal appendix.     4.  Hysterectomy.  Report per radiology    Laboratory:  CBC: WBC 5.2, HGB 13.0,      BMP: glucose 128 (H) o/w WNL (Creatinine 0.60)     UA with microscopic: slightly cloudy appearance, few amorphous crystals, squamous epithelial 3 (H) o/w WNL     Lipase: 73        Emergency Department Course:  Reviewed:  I reviewed nursing notes, vitals, past medical history, Care Everywhere and [facility]    Assessments:  1731 I obtained history and examined the patient as noted above.     1915 I rechecked the patient and explained findings.     Interventions:  1817 Lidocaine 2% 15 mL & Maalox 15 mL PO    Disposition:  The patient was discharged to home.     Impression & Plan       Medical Decision Making:  Adriane Garrett is a 46 year old female who presents today for evaluation of epigastric discomfort.  Patient does have a history of chronic abdominal discomfort and actually has a care plan in place for this.  Her evaluation showed no acute findings.  She did have some minimal relief with a GI cocktail therefore we will try Carafate and advised her to use over-the-counter antacid such as Prilosec or Pepcid.  Again her  laboratory studies were noncontributory.  I did perform a CT which showed no acute processes.  I did notice her care plan after I had performed these studies.  Advised on appropriate dietary changes and follow-up with gastroenterology.  There was no indication for further testing or imagery.  She appears to be safe and appropriate for outpatient management follow-up and is discharged home.      Diagnosis:    ICD-10-CM    1. Epigastric pain  R10.13        Discharge Medications:  New Prescriptions    SUCRALFATE (CARAFATE) 1 GM/10ML SUSPENSION    Take 10 mLs (1 g) by mouth 4 times daily for 7 days       Scribe Disclosure:  I, Yamel Sadie, am serving as a scribe at 5:29 PM on 10/6/2021 to document services personally performed by Roderick Dave APRN based on my observations and the provider's statements to me.              Roderick Dave APRN CNP  10/06/21 1950

## 2021-10-07 NOTE — DISCHARGE INSTRUCTIONS
Avoid fatty spicy meals, coffee, soda, and chocolate. These are things that can aggravate it and irritated stomach. Take Carafate as directed. Take an over-the-counter antacid such as Prilosec or Pepcid. Take scheduled as per package instructions for at least 2 weeks. I would recommend a full month. Follow-up with gastroenterology.

## 2021-10-19 ENCOUNTER — APPOINTMENT (OUTPATIENT)
Dept: GENERAL RADIOLOGY | Facility: CLINIC | Age: 46
End: 2021-10-19
Attending: PHYSICIAN ASSISTANT

## 2021-10-19 ENCOUNTER — HOSPITAL ENCOUNTER (EMERGENCY)
Facility: CLINIC | Age: 46
Discharge: HOME | End: 2021-10-19
Attending: PHYSICIAN ASSISTANT | Admitting: PHYSICIAN ASSISTANT

## 2021-10-19 VITALS
TEMPERATURE: 97.5 F | RESPIRATION RATE: 16 BRPM | SYSTOLIC BLOOD PRESSURE: 149 MMHG | OXYGEN SATURATION: 99 % | DIASTOLIC BLOOD PRESSURE: 70 MMHG | HEART RATE: 47 BPM

## 2021-10-19 DIAGNOSIS — Z20.822 PERSON UNDER INVESTIGATION FOR COVID-19: ICD-10-CM

## 2021-10-19 DIAGNOSIS — R05.9 COUGH: ICD-10-CM

## 2021-10-19 LAB
ALBUMIN SERPL-MCNC: 3.6 G/DL (ref 3.4–5)
ALP SERPL-CCNC: 82 U/L (ref 40–150)
ALT SERPL W P-5'-P-CCNC: 60 U/L (ref 0–50)
ANION GAP SERPL CALCULATED.3IONS-SCNC: 5 MMOL/L (ref 3–14)
AST SERPL W P-5'-P-CCNC: 35 U/L (ref 0–45)
BILIRUB SERPL-MCNC: 0.3 MG/DL (ref 0.2–1.3)
BUN SERPL-MCNC: 11 MG/DL (ref 7–30)
CALCIUM SERPL-MCNC: 8.9 MG/DL (ref 8.5–10.1)
CHLORIDE BLD-SCNC: 107 MMOL/L (ref 94–109)
CO2 SERPL-SCNC: 29 MMOL/L (ref 20–32)
CREAT SERPL-MCNC: 0.67 MG/DL (ref 0.52–1.04)
ERYTHROCYTE [DISTWIDTH] IN BLOOD BY AUTOMATED COUNT: 13.4 % (ref 10–15)
FLUAV RNA SPEC QL NAA+PROBE: NEGATIVE
FLUBV RNA RESP QL NAA+PROBE: NEGATIVE
GFR SERPL CREATININE-BSD FRML MDRD: >90 ML/MIN/1.73M2
GLUCOSE BLD-MCNC: 153 MG/DL (ref 70–99)
HCT VFR BLD AUTO: 40.4 % (ref 35–47)
HGB BLD-MCNC: 13 G/DL (ref 11.7–15.7)
HOLD SPECIMEN: NORMAL
HOLD SPECIMEN: NORMAL
MCH RBC QN AUTO: 30.2 PG (ref 26.5–33)
MCHC RBC AUTO-ENTMCNC: 32.2 G/DL (ref 31.5–36.5)
MCV RBC AUTO: 94 FL (ref 78–100)
PLATELET # BLD AUTO: 343 10E3/UL (ref 150–450)
POTASSIUM BLD-SCNC: 3.5 MMOL/L (ref 3.4–5.3)
PROT SERPL-MCNC: 7.4 G/DL (ref 6.8–8.8)
RBC # BLD AUTO: 4.3 10E6/UL (ref 3.8–5.2)
RSV RNA SPEC NAA+PROBE: NEGATIVE
SARS-COV-2 RNA RESP QL NAA+PROBE: NEGATIVE
SODIUM SERPL-SCNC: 141 MMOL/L (ref 133–144)
TROPONIN I SERPL-MCNC: <0.015 UG/L (ref 0–0.04)
WBC # BLD AUTO: 5.3 10E3/UL (ref 4–11)

## 2021-10-19 PROCEDURE — 80053 COMPREHEN METABOLIC PANEL: CPT | Performed by: PHYSICIAN ASSISTANT

## 2021-10-19 PROCEDURE — 85027 COMPLETE CBC AUTOMATED: CPT | Performed by: PHYSICIAN ASSISTANT

## 2021-10-19 PROCEDURE — 36415 COLL VENOUS BLD VENIPUNCTURE: CPT | Performed by: PHYSICIAN ASSISTANT

## 2021-10-19 PROCEDURE — 93005 ELECTROCARDIOGRAM TRACING: CPT

## 2021-10-19 PROCEDURE — 87637 SARSCOV2&INF A&B&RSV AMP PRB: CPT | Performed by: PHYSICIAN ASSISTANT

## 2021-10-19 PROCEDURE — 99285 EMERGENCY DEPT VISIT HI MDM: CPT | Mod: 25

## 2021-10-19 PROCEDURE — 71045 X-RAY EXAM CHEST 1 VIEW: CPT

## 2021-10-19 PROCEDURE — 84484 ASSAY OF TROPONIN QUANT: CPT | Performed by: PHYSICIAN ASSISTANT

## 2021-10-19 ASSESSMENT — ENCOUNTER SYMPTOMS
ABDOMINAL PAIN: 0
FEVER: 0
SHORTNESS OF BREATH: 0
COUGH: 1

## 2021-10-19 NOTE — ED PROVIDER NOTES
"  History     Chief Complaint:  Chest Pain    The history is provided by the patient.      Adriane Garrett is a 46 year old female with history of asthma, hypertension, hyperlipidemia, COPD who presents with 5 days of chest pain and 4 days of cough. She reports that it feels like \"something is sitting on [her] chest.\" Her chest pain worsens when she coughs or takes deep breaths. Adriane denies fever, shortness of breath, abdominal pain. She is not immunized against COVID-19 but denies known exposure to COVID-19. She denies history of blood clots but endorses a history of asthma.    Review of Systems   Constitutional: Negative for fever.   Respiratory: Positive for cough. Negative for shortness of breath.    Cardiovascular: Positive for chest pain.   Gastrointestinal: Negative for abdominal pain.   All other systems reviewed and are negative.    Allergies:  Citalopram  Quinolones  Varenicline  Adhesives  Ampicillin  Aspirin  Chantix  Ciprofloxacin  Penicillins  Tessalon     Medications:  Tessalon  Pepcid  Reglan  Albuterol     Past Medical History:    IBS  Liver disease  Morbid obesity  Migraine  Hypertension  Hyperlipidemia  Pancreatitis  Bipolar disorder  Asthma  GERD  Ovarian cyst  Darier disease  Pneumonia  Sinus bradycardia  Oral candidiasis  Bronchitis  Narcotic dependence  Endometriosis  Dysplastic nevus  Post cholecystectomy syndrome  Sialoadenitis  Sebaceous cyst  Hypomagnesemia  Gingivitis  Skin abscess  Furuncle  Herpes zoster  COPD      Past Surgical History:    Cholecystectomy  Hysterectomy and tubal ligation  Subtalar arthroereisis  Myringotomy with tube placement  EGD with biopsy  Oophorectomy laparotomy     Family History:    Mother: diabetes mellitus  Father: aneurysm, stroke  Brother: CAD, asthma  Sister: diabetes mellitus, asthma  Child: diabetes mellitus, asthma     Social History:  Presents alone.  Presents via car.    Physical Exam     Patient Vitals for the past 24 hrs:   BP Temp Pulse Resp SpO2 "   10/19/21 2006 -- -- (!) 47 16 99 %   10/19/21 2000 (!) 149/70 -- -- 20 97 %   10/19/21 1920 -- -- 50 16 98 %   10/19/21 1915 (!) 152/66 -- 50 17 98 %   10/19/21 1910 -- -- 50 15 97 %   10/19/21 1905 -- -- (!) 47 13 97 %   10/19/21 1900 (!) 150/78 -- 51 14 97 %   10/19/21 1855 -- -- 52 13 99 %   10/19/21 1850 -- -- 50 19 97 %   10/19/21 1845 (!) 152/69 -- 59 16 97 %   10/19/21 1840 -- -- 50 20 96 %   10/19/21 1835 -- -- 51 19 96 %   10/19/21 1830 (!) 151/81 -- 52 20 97 %   10/19/21 1825 -- -- 52 16 98 %   10/19/21 1820 -- -- (!) 47 22 98 %   10/19/21 1815 (!) 153/68 -- (!) 48 18 96 %   10/19/21 1810 -- -- 56 19 98 %   10/19/21 1805 -- -- (!) 49 11 97 %   10/19/21 1800 (!) 160/93 -- 58 13 97 %   10/19/21 1735 (!) 143/73 -- 51 20 97 %   10/19/21 1537 (!) 140/82 -- -- 20 --   10/19/21 1535 -- -- 71 -- 96 %   10/19/21 1534 -- 97.5  F (36.4  C) 65 -- --       Physical Exam  General: Alert, cooperative   Head:  Scalp is atraumatic.  Eyes:  The pupils are equal, round, and reactive to light. Normal conjunctiva.   ENT:                                      Ears:  The external ears are normal. TM's non-erythematous. External canals normal.    Nose:  The external nose is normal.  Throat:  The oropharynx is normal. Mucus membranes are moist.                 Neck:  Normal range of motion.   CV:  Normal rate. No murmur. 2+ radial pulses  Resp:  Breath sounds are clear bilaterally. Non-labored, no retractions or accessory muscle use.  GI:  Abdomen is soft, no distension, no tenderness.   MS:  Normal range of motion. No acute deformities.   Skin:  Warm and dry. No rash.   Neuro:   Alert. Strength and sensation grossly intact.   Psych: Awake. Alert.  Appropriate interactions.          Emergency Department Course     ECG  ECG taken at 1606, ECG read at 1806  Sinus bradycardia  Minimal voltage criteria for LVH, may be normal variant  Borderline ECG  Rate 57 bpm. RI interval 176 ms. QRS duration 114 ms. QT/QTc 438/426 ms. P-R-T axes  57 0 42.     Imaging:  XR Chest Port 1 View    (Results Pending)     Report per radiology    Laboratory:  Labs Ordered and Resulted from Time of ED Arrival Up to the Time of Departure from the ED   COMPREHENSIVE METABOLIC PANEL - Abnormal; Notable for the following components:       Result Value    Glucose 153 (*)     ALT 60 (*)     All other components within normal limits   CBC WITH PLATELETS - Normal   TROPONIN I - Normal   EXTRA BLUE TOP TUBE   EXTRA RED TOP TUBE   INFLUENZA A/B & SARS-COV2 PCR MULTIPLEX   EXTRA TUBE    Narrative:     The following orders were created for panel order Extra Tube ([place] Draw).  Procedure                               Abnormality         Status                     ---------                               -----------         ------                     Extra Blue Top Tube[[id number]]                              Final result               Extra Red Top Tube[[id number]]                               Final result                 Please view results for these tests on the individual orders.     Emergency Department Course:  Reviewed:  I reviewed nursing notes, vitals, past medical history and Care Everywhere    Assessments:  1725 I obtained history and examined the patient as noted above.    I rechecked the patient and explained findings.     Disposition:  The patient was discharged to home.     Impression & Plan     Medical Decision Making:  Adriane Garrett is a 46 year old female presents emergency department with cough for the last 4 days.  She is concern for possible COVID-19 infection.  There has been no fevers and vitals reassuring here besides mild hypertension.  Work-up in the emergency department is largely unremarkable, COVID-19 currently pending.  EKG reveals sinus bradycardia without acute ischemic changes.  Troponin negative.  Patient reports pain has been ongoing for greater than 6 hours on indication for serial troponins.  Low suspicion for acute coronary syndrome.  Chest  x-ray reviewed by myself and supervising physician and there is no evidence of pneumonia, pleural effusion, widened mediastinum, or other acute findings.  I discussed possibility of COVID-19 and she is to quarantine and to these results return.  Return precautions discussed including worsening chest pain, shortness of breath, or any other concerning symptoms develop.      Diagnosis:    ICD-10-CM    1. Cough  R05.9    2. Person under investigation for COVID-19  Z20.822        Discharge Medications:  None.      Scribe Disclosure:  I, Viviana Mccallum, am serving as a scribe at 5:25 PM on 10/19/2021 to document services personally performed by Sandee Sorenson PA-C based on my observations and the provider's statements to me.      Sandee Sorenson PA-C  10/19/21 2040

## 2021-10-19 NOTE — ED TRIAGE NOTES
Pt presents to ED with cough and chest pain.   Started Thursday. Chest pain is there all the time, but worse with the cough. Cough started on Friday. Coughing up greenish phlegm.

## 2021-10-20 LAB
ATRIAL RATE - MUSE: 57 BPM
DIASTOLIC BLOOD PRESSURE - MUSE: NORMAL MMHG
INTERPRETATION ECG - MUSE: NORMAL
P AXIS - MUSE: 57 DEGREES
PR INTERVAL - MUSE: 176 MS
QRS DURATION - MUSE: 114 MS
QT - MUSE: 438 MS
QTC - MUSE: 426 MS
R AXIS - MUSE: 0 DEGREES
SYSTOLIC BLOOD PRESSURE - MUSE: NORMAL MMHG
T AXIS - MUSE: 42 DEGREES
VENTRICULAR RATE- MUSE: 57 BPM

## 2021-10-20 NOTE — DISCHARGE INSTRUCTIONS
Discharge Instructions  COVID-19    COVID-19 is the disease caused by a new coronavirus. The virus spreads from person-to-person primarily by droplets when an infected person coughs or sneezes and the droplets are then breathed in by another person. There are tests available to diagnose COVID-19. You may have been diagnosed with COVID, may be being tested for COVID and have a pending test result, or may have been exposed to COVID.    Symptoms of COVID-19  Many people have no symptoms or mild symptoms.  Symptoms may usually appear 4 to 5 days (up to 14 days) after contact with a person with COVID-19. Some people will get severe symptoms and pneumonia. Usual symptoms are:     ? Fever  ? Cough  ? Trouble breathing    Less common symptoms are: Headache, body aches, sore throat, sneezing, diarrhea, loss of taste or smell.    Isolation and Quarantine    You may have been seen because you have symptoms, had an exposure, or had some other concern about possible COVID. The best way to stop the spread of the virus is to avoid contact with others.    Isolation refers to sick people staying away from people who are not sick. A person in quarantine is limiting activity because they were exposed and are waiting to see if they might become sick.    If you test positive for COVID, you should stay home (isolation) for at least 10 days after your symptoms began, and for 24 hours with no fever and improvement of symptoms--whichever is longer. (Your fever should be gone for 24 hours without using fever-reducing medicine). If you have no symptoms, you should stay home (isolation) for 10 days from the day of the test. If you have been vaccinated for COVID, the vaccination will not cause you to test positive so a positive test result generally is a  true positive .    For example, if you have a fever and cough for 6 days, you need to stay home 4 more days with no fever for a total of 10 days. Or, if you have a fever and cough for 10 days,  you need to stay home one more day with no fever for a total of 11 days.    If you have a high-risk exposure to COVID (you spent 15 minutes or more within six feet of somebody who has COVID), you should stay home (quarantine) for 14 days, unless you are vaccinated. Even if you test negative for COVID, the CDC recommends a 14-day quarantine from the time of your last exposure to that individual (unless you are vaccinated). There are options for a shortened (<14 day quarantine) you can review at:  https://www.health.[facility]./diseases/coronavirus/close.html#long    If you live in the same house as somebody with COVID and cannot separate from them, you will need to quarantine for 14-days after that person's isolation (infectious) period. That means that you may need to quarantine for 24-days after that person became symptomatic/ill.    If you are vaccinated and do not develop symptoms, you do not need to quarantine after exposure.    If you have symptoms but a negative test, you should stay at home until you are symptom-free and without fever for 24 hours, using the same judgment you would for when it is safe to return to work/school from strep throat, influenza, or the common cold. If you worsen, you should consider being re-evaluated.    If you are being tested for COVID because of symptoms and your test is pending, you should stay home until you know your test result.    If I have COVID, how should I protect myself and others?    Do not go to work or school. Have a friend or relative do your shopping. Do not use public transportation (bus, train) or ridesharing (Lyft, Uber).    Separate yourself from other people in your home. As much as possible, you should stay in one room and away from other people in your home. Also, use a separate bathroom, if possible. Avoid handling pets or other animals while sick.     Wear a facemask if you need to be around other people and cover your mouth and nose with a tissue when  you cough or sneeze.     Avoid sharing personal household items. You should not share dishes, drinking glasses, forks/knives/spoons, towels, or bedding with other people in your home. After using these items, they should be washed with soap and water. Clean parts of your home that are touched often (doorknobs, faucets, countertops, etc.) daily.     Wash your hands often with soap and water for at least 20 seconds or use an alcohol-based hand  containing at least 60% alcohol.     Avoid touching your face.    Treat your symptoms. You can take Acetaminophen (Tylenol) to treat body aches and fever as needed for comfort. Ibuprofen (Advil or Motrin) can be used as well if you still have symptoms after taking Tylenol. Drink fluids. Rest.    Watch for worsening symptoms such as shortness of breath/difficulty breathing or very severe weakness.    Employers/workplaces are being asked by the Centers for Disease Control (CDC) to not request notes/documentation for you to return to work or prove that you were ill. You may choose to show your employer this paperwork. Also, repeat testing should not be required to return to work.    Exercise/Sports in rare cases, COVID could affect your heart in a way that makes exercise or participation in sports dangerous.    If you have a mild COVID illness (fever, cough, sore throat, and similar symptoms but no difficulty breathing or abnormalities of the lung): After your COVID symptoms have resolved, wait 14-days before returning to activity.  If you have more than a mild illness (meaning that you have problems with your breathing or lungs) or if you participate in competitive or strenuous activity or have a history of heart disease: Please see your primary doctor/provider prior to return to activity/competition.    Antibody treatments are available for patients with mild to moderate COVID illness in order to prevent severe illness. In general, only patients with risk factors for  severe illness are eligible for treatment. For more information, to see if you are eligible, and to find treatment, go to the [facility]:  [email or web address]/[email or web address]     Return to the Emergency Department if:    If you are developing worsening breathing, shortness of breath, or feel worse you should seek medical attention.  If you are uncertain, contact your health care provider/clinic. If you need emergency medical attention, call 911 and tell them you have been ill.

## 2021-11-18 ENCOUNTER — HOSPITAL ENCOUNTER (EMERGENCY)
Facility: CLINIC | Age: 46
Discharge: HOME | End: 2021-11-18
Attending: PHYSICIAN ASSISTANT | Admitting: PHYSICIAN ASSISTANT
Payer: OTHER GOVERNMENT

## 2021-11-18 ENCOUNTER — APPOINTMENT (OUTPATIENT)
Dept: GENERAL RADIOLOGY | Facility: CLINIC | Age: 46
End: 2021-11-18
Attending: PHYSICIAN ASSISTANT
Payer: OTHER GOVERNMENT

## 2021-11-18 VITALS
SYSTOLIC BLOOD PRESSURE: 122 MMHG | HEART RATE: 72 BPM | TEMPERATURE: 99.5 F | RESPIRATION RATE: 18 BRPM | DIASTOLIC BLOOD PRESSURE: 84 MMHG | OXYGEN SATURATION: 98 %

## 2021-11-18 DIAGNOSIS — Z20.822 SUSPECTED 2019 NOVEL CORONAVIRUS INFECTION: ICD-10-CM

## 2021-11-18 LAB
FLUAV RNA SPEC QL NAA+PROBE: NEGATIVE
FLUBV RNA RESP QL NAA+PROBE: NEGATIVE
SARS-COV-2 RNA RESP QL NAA+PROBE: POSITIVE

## 2021-11-18 PROCEDURE — 71046 X-RAY EXAM CHEST 2 VIEWS: CPT

## 2021-11-18 PROCEDURE — 87636 SARSCOV2 & INF A&B AMP PRB: CPT | Performed by: PHYSICIAN ASSISTANT

## 2021-11-18 PROCEDURE — 99284 EMERGENCY DEPT VISIT MOD MDM: CPT | Mod: 25

## 2021-11-18 PROCEDURE — 94640 AIRWAY INHALATION TREATMENT: CPT

## 2021-11-18 PROCEDURE — C9803 HOPD COVID-19 SPEC COLLECT: HCPCS

## 2021-11-18 PROCEDURE — 250N000013 HC RX MED GY IP 250 OP 250 PS 637: Performed by: PHYSICIAN ASSISTANT

## 2021-11-18 RX ORDER — ACETAMINOPHEN 500 MG
1000 TABLET ORAL ONCE
Status: COMPLETED | OUTPATIENT
Start: 2021-11-18 | End: 2021-11-18

## 2021-11-18 RX ORDER — ALBUTEROL SULFATE 90 UG/1
2 AEROSOL, METERED RESPIRATORY (INHALATION) EVERY 4 HOURS PRN
Qty: 18 G | Refills: 0 | Status: SHIPPED | OUTPATIENT
Start: 2021-11-18 | End: 2022-12-14

## 2021-11-18 RX ORDER — ALBUTEROL SULFATE 90 UG/1
6 AEROSOL, METERED RESPIRATORY (INHALATION) ONCE
Status: COMPLETED | OUTPATIENT
Start: 2021-11-18 | End: 2021-11-18

## 2021-11-18 RX ADMIN — ACETAMINOPHEN 1000 MG: 500 TABLET, FILM COATED ORAL at 19:26

## 2021-11-18 RX ADMIN — ALBUTEROL SULFATE 6 PUFF: 90 AEROSOL, METERED RESPIRATORY (INHALATION) at 19:27

## 2021-11-19 NOTE — DISCHARGE INSTRUCTIONS
Discharge Instructions  COVID-19    COVID-19 is the disease caused by a new coronavirus. The virus spreads from person-to-person primarily by droplets when an infected person coughs or sneezes and the droplets are then breathed in by another person. There are tests available to diagnose COVID-19. You may have been diagnosed with COVID, may be being tested for COVID and have a pending test result, or may have been exposed to COVID.    Symptoms of COVID-19  Many people have no symptoms or mild symptoms.  Symptoms may usually appear 4 to 5 days (up to 14 days) after contact with a person with COVID-19. Some people will get severe symptoms and pneumonia. Usual symptoms are:     ? Fever  ? Cough  ? Trouble breathing    Less common symptoms are: Headache, body aches, sore throat, sneezing, diarrhea, loss of taste or smell.    Isolation and Quarantine    You may have been seen because you have symptoms, had an exposure, or had some other concern about possible COVID. The best way to stop the spread of the virus is to avoid contact with others.    Isolation refers to sick people staying away from people who are not sick. A person in quarantine is limiting activity because they were exposed and are waiting to see if they might become sick.    If you test positive for COVID, you should stay home (isolation) for at least 10 days after your symptoms began, and for 24 hours with no fever and improvement of symptoms--whichever is longer. (Your fever should be gone for 24 hours without using fever-reducing medicine). If you have no symptoms, you should stay home (isolation) for 10 days from the day of the test. If you have been vaccinated for COVID, the vaccination will not cause you to test positive so a positive test result generally is a  true positive .    For example, if you have a fever and cough for 6 days, you need to stay home 4 more days with no fever for a total of 10 days. Or, if you have a fever and cough for 10 days,  you need to stay home one more day with no fever for a total of 11 days.    If you have a high-risk exposure to COVID (you spent 15 minutes or more within six feet of somebody who has COVID), you should stay home (quarantine) for 14 days, unless you are vaccinated. Even if you test negative for COVID, the CDC recommends a 14-day quarantine from the time of your last exposure to that individual (unless you are vaccinated). There are options for a shortened (<14 day quarantine) you can review at:  https://www.health.[facility]./diseases/coronavirus/close.html#long    If you live in the same house as somebody with COVID and cannot separate from them, you will need to quarantine for 14-days after that person's isolation (infectious) period. That means that you may need to quarantine for 24-days after that person became symptomatic/ill.    If you are vaccinated and do not develop symptoms, you do not need to quarantine after exposure.    If you have symptoms but a negative test, you should stay at home until you are symptom-free and without fever for 24 hours, using the same judgment you would for when it is safe to return to work/school from strep throat, influenza, or the common cold. If you worsen, you should consider being re-evaluated.    If you are being tested for COVID because of symptoms and your test is pending, you should stay home until you know your test result.    If I have COVID, how should I protect myself and others?    Do not go to work or school. Have a friend or relative do your shopping. Do not use public transportation (bus, train) or ridesharing (Lyft, Uber).    Separate yourself from other people in your home. As much as possible, you should stay in one room and away from other people in your home. Also, use a separate bathroom, if possible. Avoid handling pets or other animals while sick.     Wear a facemask if you need to be around other people and cover your mouth and nose with a tissue when  you cough or sneeze.     Avoid sharing personal household items. You should not share dishes, drinking glasses, forks/knives/spoons, towels, or bedding with other people in your home. After using these items, they should be washed with soap and water. Clean parts of your home that are touched often (doorknobs, faucets, countertops, etc.) daily.     Wash your hands often with soap and water for at least 20 seconds or use an alcohol-based hand  containing at least 60% alcohol.     Avoid touching your face.    Treat your symptoms. You can take Acetaminophen (Tylenol) to treat body aches and fever as needed for comfort. Ibuprofen (Advil or Motrin) can be used as well if you still have symptoms after taking Tylenol. Drink fluids. Rest.    Watch for worsening symptoms such as shortness of breath/difficulty breathing or very severe weakness.    Employers/workplaces are being asked by the Centers for Disease Control (CDC) to not request notes/documentation for you to return to work or prove that you were ill. You may choose to show your employer this paperwork. Also, repeat testing should not be required to return to work.    Exercise/Sports in rare cases, COVID could affect your heart in a way that makes exercise or participation in sports dangerous.    If you have a mild COVID illness (fever, cough, sore throat, and similar symptoms but no difficulty breathing or abnormalities of the lung): After your COVID symptoms have resolved, wait 14-days before returning to activity.  If you have more than a mild illness (meaning that you have problems with your breathing or lungs) or if you participate in competitive or strenuous activity or have a history of heart disease: Please see your primary doctor/provider prior to return to activity/competition.    Antibody treatments are available for patients with mild to moderate COVID illness in order to prevent severe illness. In general, only patients with risk factors for  severe illness are eligible for treatment. For more information, to see if you are eligible, and to find treatment, go to the [facility] [place] [facility]:  [email or web address]/[email or web address]/mnrap.html     Return to the Emergency Department if:    If you are developing worsening breathing, shortness of breath, or feel worse you should seek medical attention.  If you are uncertain, contact your health care provider/clinic. If you need emergency medical attention, call 911 and tell them you have been ill.

## 2021-11-19 NOTE — ED TRIAGE NOTES
A&O x4.  ABC's intact.      Pt arrives with c/o cough that started Monday, chest pain that started yesterday which worsens with cough or deep breathing, sore throat yesterday, & headache since Sunday.  Pt took dayquil at 1500 today.

## 2021-11-19 NOTE — ED PROVIDER NOTES
History   Chief Complaint:  URI       HPI   Adrianeshauna Garrett is a 46 year old female with complex past medical history including IBS, liver disease, migraines, chronic chest pain, asthma among others who presents with cough, sore throat, headache, low-grade fever.  Patient symptoms began 3 days ago.  Her chest feels tight when she coughs or takes a deep breath in.  Highest temp at home was 99.5.  She is unvaccinated and notes that she was recently exposed to several family members about a week to 10 days ago who are positive for Covid.  She has had no hemoptysis, leg swelling, recent surgery or overnight hospitalization.  No prior history of PE or DVT.  She vapes but does not smoke.  Has not needed to use inhalers recently.    Review of Systems   All other systems reviewed and are negative.      Allergies:  Ampicillin  Aspirin  Chantix   Ciprofloxacin  Penicillins  Tessalon     Medications:  Albuterol inhaler    Past Medical History:     Irritable bowel syndrome   Vitamin D deficiency  Pancreatitis  Chronic non-alcoholic liver disease  Morbid obesity  Migraines  Hypertension  Hyperlipidemia  GERD  Asthma      Past Surgical History:   Foot surgery  Cholecystectomy  Hysterectomy  Salpingo oophorectomy     Family History:    Diabetes, mother  Diabetes, sister  Diabetes, nephew  Diabetes, child  Heart disease, brother  Aneurysm, father    Social History:  Arrives via car   Unaccompanied in ED   E cigarrette use    Physical Exam     Patient Vitals for the past 24 hrs:   BP Temp Temp src Pulse Resp SpO2   11/18/21 1832 (!) 187/86 99.5  F (37.5  C) Oral 69 18 100 %       Physical Exam  General: Awake, alert, pleasant, non-toxic.  Head:  Scalp is NC/AT  Eyes:  Conjunctiva normal, PERRL  ENT:  The external nose and ears are normal.     The oropharynx is mildly red without tonsillar swelling. Uvula midline, no submandibular swelling, sublingual swelling, trismus.  TM's normal BL, no mastoid swelling or tenderness.  External  canals normal, no tragal ttp. Pinna and helical structures normal.  Neck:  Normal range of motion without rigidity.  CV:  Regular rate and rhythm    No pathologic murmur, rubs, or gallops.  Resp:  Breath sounds are clear bilaterally.  No crackles, wheezes, rhonchi, stridor.    Non-labored, no retractions or accessory muscle use  Abdomen: Abdomen is soft, no distension, no tenderness, no masses  MS:  No lower extremity edema or asymmetric calf swelling.     No midline cervical, thoracic, or lumbar tenderness  Skin:  Warm and dry, No rash or lesions noted.   Neuro: Alert and oriented x3.  GCS 15 5/5 strength BL in UE and LE, normal sensation to touch.  Cranial nerves 2-12 intact.   Gait normal  Psych: Awake. Alert. Normal affect. Appropriate interactions.    Emergency Department Course   Imaging:  Chest XR,  PA & LAT   Final Result   IMPRESSION: Negative chest.        Report per radiology    Laboratory:  Labs Ordered and Resulted from Time of ED Arrival to Time of ED Departure   INFLUENZA A/B & SARS-COV2 PCR MULTIPLEX - Abnormal       Result Value    Influenza A target Negative      Influenza B target Negative      SARS CoV2 PCR Positive (*)         Emergency Department Course:    Reviewed:  I reviewed nursing notes, vitals, past medical history and Care Everywhere    Assessments:   I obtained history and examined the patient as noted above.    I rechecked the patient and explained findings.     Interventions:  Medications   acetaminophen (TYLENOL) tablet 1,000 mg (1,000 mg Oral Given 21)   albuterol (PROAIR HFA/PROVENTIL HFA/VENTOLIN HFA) 108 (90 Base) MCG/ACT inhaler 6 puff (6 puffs Inhalation Given 21)       Disposition:  The patient was discharged to home.     Impression & Plan     Medical Decision Makin-year-old female presents with cough, sore throat, headache, low-grade fevers in the setting of recent Covid infection.  Her symptoms are consistent with COVID-19 virus infection  and her test is positive.  Chest x-ray is clear.  She is not hypoxic or in respiratory distress and there is no indication for hospitalization.  There is no sign of other complication at this time such as pulmonary embolism, ACS, myocarditis, multisystem organ failure, superimposed bacterial infection such as meningitis, bacterial pneumonia, UTI.  She was enrolled in the Covid get well loop and given pulse oximeter for home.  Symptomatic medication as below.  Return precautions for increased shortness of breath, chest pain, hemoptysis, leg swelling, weakness, O2 sat less than 92%, or other new or worsening symptoms.  We discussed need to quarantine at home.  Diagnosis:    ICD-10-CM    1. Suspected 2019 novel coronavirus infection  Z20.822 COVID-19 GetWell Loop Referral       Discharge Medications:  New Prescriptions    ALBUTEROL (PROAIR HFA/PROVENTIL HFA/VENTOLIN HFA) 108 (90 BASE) MCG/ACT INHALER    Inhale 2 puffs into the lungs every 4 hours as needed for shortness of breath / dyspnea or wheezing       Scribe Disclosure:  I, Dung Liang, am serving as a scribe at 6:40 PM on 11/18/2021 to document services personally performed by jT Olvera PA-C, based on my observations and the provider's statements to me.            Tj Jose PA-C  11/18/21 2014

## 2021-12-06 ENCOUNTER — HOSPITAL ENCOUNTER (EMERGENCY)
Facility: CLINIC | Age: 46
Discharge: HOME | End: 2021-12-06
Attending: EMERGENCY MEDICINE | Admitting: EMERGENCY MEDICINE

## 2021-12-06 VITALS
SYSTOLIC BLOOD PRESSURE: 130 MMHG | HEART RATE: 54 BPM | DIASTOLIC BLOOD PRESSURE: 78 MMHG | OXYGEN SATURATION: 100 % | RESPIRATION RATE: 18 BRPM | TEMPERATURE: 97.8 F

## 2021-12-06 DIAGNOSIS — R10.9 ACUTE ABDOMINAL PAIN IN LEFT FLANK: ICD-10-CM

## 2021-12-06 LAB
ALBUMIN SERPL-MCNC: 3.6 G/DL (ref 3.4–5)
ALBUMIN UR-MCNC: NEGATIVE MG/DL
ALP SERPL-CCNC: 84 U/L (ref 40–150)
ALT SERPL W P-5'-P-CCNC: 47 U/L (ref 0–50)
ANION GAP SERPL CALCULATED.3IONS-SCNC: 4 MMOL/L (ref 3–14)
APPEARANCE UR: CLEAR
AST SERPL W P-5'-P-CCNC: 31 U/L (ref 0–45)
BASOPHILS # BLD AUTO: 0 10E3/UL (ref 0–0.2)
BASOPHILS NFR BLD AUTO: 1 %
BILIRUB DIRECT SERPL-MCNC: <0.1 MG/DL (ref 0–0.2)
BILIRUB SERPL-MCNC: 0.4 MG/DL (ref 0.2–1.3)
BILIRUB UR QL STRIP: NEGATIVE
BUN SERPL-MCNC: 7 MG/DL (ref 7–30)
CALCIUM SERPL-MCNC: 9.1 MG/DL (ref 8.5–10.1)
CHLORIDE BLD-SCNC: 107 MMOL/L (ref 94–109)
CO2 SERPL-SCNC: 29 MMOL/L (ref 20–32)
COLOR UR AUTO: NORMAL
CREAT SERPL-MCNC: 0.58 MG/DL (ref 0.52–1.04)
EOSINOPHIL # BLD AUTO: 0.1 10E3/UL (ref 0–0.7)
EOSINOPHIL NFR BLD AUTO: 2 %
ERYTHROCYTE [DISTWIDTH] IN BLOOD BY AUTOMATED COUNT: 13.5 % (ref 10–15)
GFR SERPL CREATININE-BSD FRML MDRD: >90 ML/MIN/1.73M2
GLUCOSE BLD-MCNC: 98 MG/DL (ref 70–99)
GLUCOSE UR STRIP-MCNC: NEGATIVE MG/DL
HCT VFR BLD AUTO: 40.7 % (ref 35–47)
HGB BLD-MCNC: 13 G/DL (ref 11.7–15.7)
HGB UR QL STRIP: NEGATIVE
HOLD SPECIMEN: NORMAL
HOLD SPECIMEN: NORMAL
IMM GRANULOCYTES # BLD: 0 10E3/UL
IMM GRANULOCYTES NFR BLD: 0 %
KETONES UR STRIP-MCNC: NEGATIVE MG/DL
LEUKOCYTE ESTERASE UR QL STRIP: NEGATIVE
LIPASE SERPL-CCNC: 55 U/L (ref 73–393)
LYMPHOCYTES # BLD AUTO: 2.8 10E3/UL (ref 0.8–5.3)
LYMPHOCYTES NFR BLD AUTO: 53 %
MCH RBC QN AUTO: 30 PG (ref 26.5–33)
MCHC RBC AUTO-ENTMCNC: 31.9 G/DL (ref 31.5–36.5)
MCV RBC AUTO: 94 FL (ref 78–100)
MONOCYTES # BLD AUTO: 0.5 10E3/UL (ref 0–1.3)
MONOCYTES NFR BLD AUTO: 9 %
NEUTROPHILS # BLD AUTO: 1.9 10E3/UL (ref 1.6–8.3)
NEUTROPHILS NFR BLD AUTO: 35 %
NITRATE UR QL: NEGATIVE
NRBC # BLD AUTO: 0 10E3/UL
NRBC BLD AUTO-RTO: 0 /100
PH UR STRIP: 6.5 [PH] (ref 5–7)
PLATELET # BLD AUTO: 357 10E3/UL (ref 150–450)
POTASSIUM BLD-SCNC: 3.8 MMOL/L (ref 3.4–5.3)
PROT SERPL-MCNC: 7.5 G/DL (ref 6.8–8.8)
RBC # BLD AUTO: 4.34 10E6/UL (ref 3.8–5.2)
RBC URINE: 0 /HPF
SODIUM SERPL-SCNC: 140 MMOL/L (ref 133–144)
SP GR UR STRIP: 1.01 (ref 1–1.03)
SQUAMOUS EPITHELIAL: <1 /HPF
UROBILINOGEN UR STRIP-MCNC: NORMAL MG/DL
WBC # BLD AUTO: 5.3 10E3/UL (ref 4–11)
WBC URINE: 1 /HPF

## 2021-12-06 PROCEDURE — 82248 BILIRUBIN DIRECT: CPT | Performed by: EMERGENCY MEDICINE

## 2021-12-06 PROCEDURE — 80048 BASIC METABOLIC PNL TOTAL CA: CPT | Performed by: EMERGENCY MEDICINE

## 2021-12-06 PROCEDURE — 81003 URINALYSIS AUTO W/O SCOPE: CPT | Performed by: EMERGENCY MEDICINE

## 2021-12-06 PROCEDURE — 83690 ASSAY OF LIPASE: CPT | Performed by: EMERGENCY MEDICINE

## 2021-12-06 PROCEDURE — 250N000011 HC RX IP 250 OP 636: Performed by: EMERGENCY MEDICINE

## 2021-12-06 PROCEDURE — 85025 COMPLETE CBC W/AUTO DIFF WBC: CPT | Performed by: EMERGENCY MEDICINE

## 2021-12-06 PROCEDURE — 96374 THER/PROPH/DIAG INJ IV PUSH: CPT

## 2021-12-06 PROCEDURE — 99285 EMERGENCY DEPT VISIT HI MDM: CPT | Mod: 25

## 2021-12-06 PROCEDURE — 36415 COLL VENOUS BLD VENIPUNCTURE: CPT | Performed by: EMERGENCY MEDICINE

## 2021-12-06 RX ORDER — MORPHINE SULFATE 4 MG/ML
4 INJECTION, SOLUTION INTRAMUSCULAR; INTRAVENOUS ONCE
Status: COMPLETED | OUTPATIENT
Start: 2021-12-06 | End: 2021-12-06

## 2021-12-06 RX ADMIN — MORPHINE SULFATE 4 MG: 4 INJECTION INTRAVENOUS at 17:46

## 2021-12-06 ASSESSMENT — ENCOUNTER SYMPTOMS
DIARRHEA: 1
FEVER: 0
HEMATURIA: 0
DYSURIA: 0
ABDOMINAL PAIN: 1
VOMITING: 0
CHILLS: 1

## 2021-12-06 NOTE — ED PROVIDER NOTES
History   Chief Complaint:  Abdominal Pain     The history is provided by the patient.      Adriane Garrett is a 46 year old female with history of hypertension and hyperlipidemia who presents with abdominal pain. The patient tells us that yesterday evening around 2200 she had a sudden onset of left sided abdominal pain. She describes this pain as sharp, starting on the left side of her abdomen and radiating to her back and into her left shoulder. In addition to the pain, the patient is also having diarrhea and chills. For the pain, the patient took Tylenol PM yesterday evening which did not relieve her symptoms. The patient denies fever, dysuria, hematuria or vomiting.     Review of Systems   Constitutional: Positive for chills. Negative for fever.   Gastrointestinal: Positive for abdominal pain and diarrhea. Negative for vomiting.   Genitourinary: Negative for dysuria and hematuria.   All other systems reviewed and are negative.    Allergies:  Ampicillin  Aspirin  Chantix   Ciprofloxacin  Penicillins  Tessalon      Medications:  Albuterol inhaler    Past Medical History:     Irritable bowel syndrome   Vitamin D deficiency  Pancreatitis  Chronic non-alcoholic liver disease  Morbid obesity  Migraines  Hypertension  Hyperlipidemia  GERD  Asthma        Past Surgical History:    Foot surgery  Cholecystectomy  Hysterectomy total abdomen  Salpingo oophorectomy - multiple surgeries      Family History:    Diabetes  Heart disease  Coronary stenting  Aneurysm     Social History:  The patient presents to the ED alone.     Physical Exam     Patient Vitals for the past 24 hrs:   BP Temp Temp src Pulse Resp SpO2   12/06/21 1919 -- -- -- -- -- 100 %   12/06/21 1917 130/78 -- -- 54 -- --   12/06/21 1543 123/89 97.8  F (36.6  C) Temporal 51 18 99 %     Physical Exam    General: alert, lying comfortably on gurney  HENT: mucous membranes moist  CV: regular rate, regular rhythm  Resp: normal effort, clear throughout, no crackles or  wheezing  GI: abdomen soft, no guarding. Left sided CVA tenderness. Left upper quadrant tenderness, no rebound or guarding.   MSK: no bony tenderness  Skin: appropriately warm and dry  Extremities: no edema, calves non-tender  Neuro: alert, clear speech, oriented  Psych: normal mood and affect    Emergency Department Course     Imaging:    POC US RETROPERITONEAL LIMITED   Final Result   [facility] Procedure Note     Limited Bedside ED Renal Ultrasound:      PERFORMED BY: Dr. Lilliam Price MD   INDICATIONS:  Flank Pain   PROBE: Low frequency convex probe   BODY LOCATION:  Abdomen   FINDINGS:  The ultrasound was performed with longitudinal and transverse views.    Right Kidney:    Hydronephrosis:  None    Renal cyst:  None   Left Kidney:    Hydronephrosis:  None    Renal cyst:  None   INTERPRETATION:  The evaluation of the kidneys was normal without evidence of hydronephrosis or cysts.   IMAGE DOCUMENTATION: Images were archived to PACs system.           Report per radiology    Laboratory:    Labs Ordered and Resulted from Time of ED Arrival to Time of ED Departure   BASIC METABOLIC PANEL - Normal       Result Value    Sodium 140      Potassium 3.8      Chloride 107      Carbon Dioxide (CO2) 29      Anion Gap 4      Urea Nitrogen 7      Creatinine 0.58      Calcium 9.1      Glucose 98      GFR Estimate >90     ROUTINE UA WITH MICROSCOPIC REFLEX TO CULTURE - Normal    Color Urine Straw      Appearance Urine Clear      Glucose Urine Negative      Bilirubin Urine Negative      Ketones Urine Negative      Specific Gravity Urine 1.009      Blood Urine Negative      pH Urine 6.5      Protein Albumin Urine Negative      Urobilinogen Urine Normal      Nitrite Urine Negative      Leukocyte Esterase Urine Negative      RBC Urine 0      WBC Urine 1      Squamous Epithelials Urine <1     CBC WITH PLATELETS AND DIFFERENTIAL    WBC Count 5.3      RBC Count 4.34      Hemoglobin 13.0      Hematocrit 40.7      MCV 94       MCH 30.0      MCHC 31.9      RDW 13.5      Platelet Count 357      % Neutrophils 35      % Lymphocytes 53      % Monocytes 9      % Eosinophils 2      % Basophils 1      % Immature Granulocytes 0      NRBCs per 100 WBC 0      Absolute Neutrophils 1.9      Absolute Lymphocytes 2.8      Absolute Monocytes 0.5      Absolute Eosinophils 0.1      Absolute Basophils 0.0      Absolute Immature Granulocytes 0.0      Absolute NRBCs 0.0        Emergency Department Course:    Reviewed:  I reviewed nursing notes, vitals, past medical history, Care Everywhere and [facility]    Assessments:  1725 I obtained history and examined the patient as noted above.   1859 I rechecked the patient and explained findings. At this time I also performed a bedside ultrasound.      Interventions:  1746 Morphine 4 mg IV     Disposition:  The patient was discharged to home.     Impression & Plan     Medical Decision Making:  Adriane Garrett is a 46 year old female with history of chronic and recurrent abdominal pain, status post hysterectomy and cholecystectomy, who presents with left lower quadrant abdominal pain.  On exam, she has normal vitals.  She does have some left lower quadrant abdominal tenderness, but no rebound or guarding.  Labs are unremarkable.  I belatedly saw her care plan, therefore she did receive 1 dose of IV morphine.  Given history of recurrent CT scan, low suspicion for surgical cause of symptoms, I do not repeat CT scan on this visit.  We discussed that repeated exposure to ionizing radiation is detrimental to her health.  I did perform a bedside ultrasound to evaluate for signs of kidney stone, which was negative for any evidence of hydronephrosis.  UA does not show any evidence for infection or hematuria to suggest kidney stone.  I recommended close follow-up with PCP.  Otherwise, return to the ED for worsening pain.    Diagnosis:    ICD-10-CM    1. Acute abdominal pain in left flank  R10.9 Primary Care Referral     Discharge  Medications:  New Prescriptions    No medications on file     Scribe Disclosure:  I, Angel Antione, am serving as a scribe at 5:28 PM on 12/6/2021 to document services personally performed by Lilliam Price MD, based on my observations and the provider's statements to me.            Lilliam Price MD  12/07/21 0100

## 2021-12-06 NOTE — ED TRIAGE NOTES
Pt reports that she started having LUQ abdominal pain that started last night around 2200. Pt reports diarrhea and no changes in . Pt reports a hx of hysterectomy and gallbladder removal. PT VSS and ABC's intact

## 2021-12-07 NOTE — DISCHARGE INSTRUCTIONS
Your labs today do not show a cause for your abdominal pain.  Your vital signs are normal.  There is no evidence for kidney stone or kidney infection.  At this point, I do not think additional imaging with CT scan for example would be beneficial, especially since you have had so many CT scans in the past.  I recommend follow-up with primary care physician to discuss recurrent abdominal symptoms.  Discharge Instructions  Abdominal Pain    Abdominal pain (belly pain) can be caused by many things. Your evaluation today does not show the exact cause for your pain. Your provider today has decided that it is unlikely your pain is due to a life threatening problem, or a problem requiring surgery or hospital admission. Sometimes those problems cannot be found right away, so it is very important that you follow up as directed.  Sometimes only the changes which occur over time allow the cause of your pain to be found.    Generally, every Emergency Department visit should have a follow-up clinic visit with either a primary or a specialty clinic/provider. Please follow-up as instructed by your emergency provider today. With abdominal pain, we often recommend very close follow-up, such as the following day.    ADULTS:  Return to the Emergency Department right away if:    You get an oral temperature above 102oF or as directed by your provider.  You have blood in your stools. This may be bright red or appear as black, tarry stools.    You keep vomiting (throwing up) or cannot drink liquids.  You see blood when you vomit.   You cannot have a bowel movement or you cannot pass gas.  Your stomach gets bloated or bigger.  Your skin or the whites of your eyes look yellow.  You faint.  You have bloody, frequent or painful urination (peeing).  You have new symptoms or anything that worries you.    CHILDREN:  Return to the Emergency Department right away if your child has any of the above-listed symptoms or the following:    Pushes your  hand away or screams/cries when his/her belly is touched.  You notice your child is very fussy or weak.  Your child is very tired and is too tired to eat or drink.  Your child is dehydrated.  Signs of dehydration can be:  Significant change in the amount of wet diapers/urine.  Your infant or child starts to have dry mouth and lips, or no saliva (spit) or tears.    PREGNANT WOMEN:  Return to the Emergency Department right away if you have any of the above-listed symptoms or the following:    You have bleeding, leaking fluid or passing tissue from the vagina.  You have worse pain or cramping, or pain in your shoulder or back.  You have vomiting that will not stop.  You have a temperature of 100oF or more.  Your baby is not moving as much as usual.  You faint.  You get a bad headache with or without eye problems and abdominal pain.  You have a seizure.  You have unusual discharge from your vagina and abdominal pain.    Abdominal pain is pretty common during pregnancy.  Your pain may or may not be related to your pregnancy. You should follow-up closely with your OB provider so they can evaluate you and your baby.  Until you follow-up with your regular provider, do the following:     Avoid sex and do not put anything in your vagina.  Drink clear fluids.  Only take medications approved by your provider.    MORE INFORMATION:    Appendicitis:  A possible cause of abdominal pain in any person who still has their appendix is acute appendicitis. Appendicitis is often hard to diagnose.  Testing does not always rule out early appendicitis or other causes of abdominal pain. Close follow-up with your provider and re-evaluations may be needed to figure out the reason for your abdominal pain.    Follow-up:  It is very important that you make an appointment with your clinic and go to the appointment.  If you do not follow-up with your primary provider, it may result in missing an important development which could result in permanent  "injury or disability and/or lasting pain.  If there is any problem keeping your appointment, call your provider or return to the Emergency Department.    Medications:  Take your medications as directed by your provider today.  Before using over-the-counter medications, ask your provider and make sure to take the medications as directed.  If you have any questions about medications, ask your provider.    Diet:  Resume your normal diet as much as possible, but do not eat fried, fatty or spicy foods while you have pain.  Do not drink alcohol or have caffeine.  Do not smoke tobacco.    Probiotics: If you have been given an antibiotic, you may want to also take a probiotic pill or eat yogurt with live cultures. Probiotics have \"good bacteria\" to help your intestines stay healthy. Studies have shown that probiotics help prevent diarrhea (loose stools) and other intestine problems (including C. diff infection) when you take antibiotics. You can buy these without a prescription in the pharmacy section of the store.     If you were given a prescription for medicine here today, be sure to read all of the information (including the package insert) that comes with your prescription.  This will include important information about the medicine, its side effects, and any warnings that you need to know about.  The pharmacist who fills the prescription can provide more information and answer questions you may have about the medicine.  If you have questions or concerns that the pharmacist cannot address, please call or return to the Emergency Department.       Remember that you can always come back to the Emergency Department if you are not able to see your regular provider in the amount of time listed above, if you get any new symptoms, or if there is anything that worries you.    "

## 2021-12-15 ENCOUNTER — HOSPITAL ENCOUNTER (EMERGENCY)
Facility: CLINIC | Age: 46
Discharge: HOME | End: 2021-12-15
Attending: EMERGENCY MEDICINE | Admitting: EMERGENCY MEDICINE

## 2021-12-15 ENCOUNTER — APPOINTMENT (OUTPATIENT)
Dept: GENERAL RADIOLOGY | Facility: CLINIC | Age: 46
End: 2021-12-15
Attending: EMERGENCY MEDICINE

## 2021-12-15 VITALS
RESPIRATION RATE: 18 BRPM | WEIGHT: 230 LBS | HEART RATE: 49 BPM | TEMPERATURE: 99.9 F | SYSTOLIC BLOOD PRESSURE: 129 MMHG | OXYGEN SATURATION: 98 % | BODY MASS INDEX: 53.23 KG/M2 | HEIGHT: 55 IN | DIASTOLIC BLOOD PRESSURE: 73 MMHG

## 2021-12-15 DIAGNOSIS — B34.9 VIRAL ILLNESS: ICD-10-CM

## 2021-12-15 DIAGNOSIS — G43.809 OTHER MIGRAINE WITHOUT STATUS MIGRAINOSUS, NOT INTRACTABLE: ICD-10-CM

## 2021-12-15 LAB
ANION GAP SERPL CALCULATED.3IONS-SCNC: 5 MMOL/L (ref 3–14)
BASOPHILS # BLD AUTO: 0 10E3/UL (ref 0–0.2)
BASOPHILS NFR BLD AUTO: 0 %
BUN SERPL-MCNC: 8 MG/DL (ref 7–30)
CALCIUM SERPL-MCNC: 8.3 MG/DL (ref 8.5–10.1)
CHLORIDE BLD-SCNC: 107 MMOL/L (ref 94–109)
CO2 SERPL-SCNC: 27 MMOL/L (ref 20–32)
CREAT SERPL-MCNC: 0.62 MG/DL (ref 0.52–1.04)
EOSINOPHIL # BLD AUTO: 0.1 10E3/UL (ref 0–0.7)
EOSINOPHIL NFR BLD AUTO: 2 %
ERYTHROCYTE [DISTWIDTH] IN BLOOD BY AUTOMATED COUNT: 13.9 % (ref 10–15)
FLUAV RNA SPEC QL NAA+PROBE: NEGATIVE
FLUBV RNA RESP QL NAA+PROBE: NEGATIVE
GFR SERPL CREATININE-BSD FRML MDRD: >90 ML/MIN/1.73M2
GLUCOSE BLD-MCNC: 146 MG/DL (ref 70–99)
HCT VFR BLD AUTO: 40.4 % (ref 35–47)
HGB BLD-MCNC: 12.8 G/DL (ref 11.7–15.7)
HOLD SPECIMEN: NORMAL
IMM GRANULOCYTES # BLD: 0 10E3/UL
IMM GRANULOCYTES NFR BLD: 0 %
LYMPHOCYTES # BLD AUTO: 2.4 10E3/UL (ref 0.8–5.3)
LYMPHOCYTES NFR BLD AUTO: 44 %
MCH RBC QN AUTO: 29.8 PG (ref 26.5–33)
MCHC RBC AUTO-ENTMCNC: 31.7 G/DL (ref 31.5–36.5)
MCV RBC AUTO: 94 FL (ref 78–100)
MONOCYTES # BLD AUTO: 0.4 10E3/UL (ref 0–1.3)
MONOCYTES NFR BLD AUTO: 7 %
NEUTROPHILS # BLD AUTO: 2.5 10E3/UL (ref 1.6–8.3)
NEUTROPHILS NFR BLD AUTO: 47 %
NRBC # BLD AUTO: 0 10E3/UL
NRBC BLD AUTO-RTO: 0 /100
PLATELET # BLD AUTO: 312 10E3/UL (ref 150–450)
POTASSIUM BLD-SCNC: 3.2 MMOL/L (ref 3.4–5.3)
RBC # BLD AUTO: 4.29 10E6/UL (ref 3.8–5.2)
SARS-COV-2 RNA RESP QL NAA+PROBE: POSITIVE
SODIUM SERPL-SCNC: 139 MMOL/L (ref 133–144)
WBC # BLD AUTO: 5.4 10E3/UL (ref 4–11)

## 2021-12-15 PROCEDURE — 85025 COMPLETE CBC W/AUTO DIFF WBC: CPT | Performed by: EMERGENCY MEDICINE

## 2021-12-15 PROCEDURE — 71046 X-RAY EXAM CHEST 2 VIEWS: CPT

## 2021-12-15 PROCEDURE — 99285 EMERGENCY DEPT VISIT HI MDM: CPT | Mod: 25

## 2021-12-15 PROCEDURE — 96374 THER/PROPH/DIAG INJ IV PUSH: CPT

## 2021-12-15 PROCEDURE — 93005 ELECTROCARDIOGRAM TRACING: CPT

## 2021-12-15 PROCEDURE — 87636 SARSCOV2 & INF A&B AMP PRB: CPT | Performed by: EMERGENCY MEDICINE

## 2021-12-15 PROCEDURE — 80048 BASIC METABOLIC PNL TOTAL CA: CPT | Performed by: EMERGENCY MEDICINE

## 2021-12-15 PROCEDURE — 96375 TX/PRO/DX INJ NEW DRUG ADDON: CPT

## 2021-12-15 PROCEDURE — C9803 HOPD COVID-19 SPEC COLLECT: HCPCS

## 2021-12-15 PROCEDURE — 250N000011 HC RX IP 250 OP 636: Performed by: EMERGENCY MEDICINE

## 2021-12-15 PROCEDURE — 36415 COLL VENOUS BLD VENIPUNCTURE: CPT | Performed by: EMERGENCY MEDICINE

## 2021-12-15 RX ORDER — ONDANSETRON 2 MG/ML
4 INJECTION INTRAMUSCULAR; INTRAVENOUS ONCE
Status: COMPLETED | OUTPATIENT
Start: 2021-12-15 | End: 2021-12-15

## 2021-12-15 RX ORDER — DEXAMETHASONE SODIUM PHOSPHATE 10 MG/ML
10 INJECTION, SOLUTION INTRAMUSCULAR; INTRAVENOUS ONCE
Status: COMPLETED | OUTPATIENT
Start: 2021-12-15 | End: 2021-12-15

## 2021-12-15 RX ORDER — DIPHENHYDRAMINE HYDROCHLORIDE 50 MG/ML
25 INJECTION INTRAMUSCULAR; INTRAVENOUS ONCE
Status: COMPLETED | OUTPATIENT
Start: 2021-12-15 | End: 2021-12-15

## 2021-12-15 RX ADMIN — ONDANSETRON 4 MG: 2 INJECTION INTRAMUSCULAR; INTRAVENOUS at 21:13

## 2021-12-15 RX ADMIN — DIPHENHYDRAMINE HYDROCHLORIDE 25 MG: 50 INJECTION INTRAMUSCULAR; INTRAVENOUS at 21:13

## 2021-12-15 RX ADMIN — DEXAMETHASONE SODIUM PHOSPHATE 10 MG: 10 INJECTION INTRAMUSCULAR; INTRAVENOUS at 21:12

## 2021-12-15 ASSESSMENT — MIFFLIN-ST. JEOR: SCORE: 688.53

## 2021-12-15 ASSESSMENT — ENCOUNTER SYMPTOMS
COUGH: 1
HEADACHES: 1

## 2021-12-15 NOTE — ED TRIAGE NOTES
Has had a headache and cough since Monday. Chest pain with cough and deep breath; states it feels like pressure as if something is sitting on her chest. Has had the same pain in the past when she has had pneumonia. Same symptoms the week before Thanksgiving. Diagnosed with Covid at that time. Patient is not immunized. Here today to make sure she doesn't have Covid again as these are the same symptoms as she had when she was diagnosed.

## 2021-12-16 LAB
ATRIAL RATE - MUSE: 51 BPM
DIASTOLIC BLOOD PRESSURE - MUSE: NORMAL MMHG
INTERPRETATION ECG - MUSE: NORMAL
P AXIS - MUSE: 50 DEGREES
PR INTERVAL - MUSE: 188 MS
QRS DURATION - MUSE: 104 MS
QT - MUSE: 448 MS
QTC - MUSE: 412 MS
R AXIS - MUSE: -3 DEGREES
SYSTOLIC BLOOD PRESSURE - MUSE: NORMAL MMHG
T AXIS - MUSE: 34 DEGREES
VENTRICULAR RATE- MUSE: 51 BPM

## 2021-12-16 NOTE — ED PROVIDER NOTES
"    History   Chief Complaint:  Cough and Headache       HPI   Adriane Garrett is a 46 year old female with history of Covid-19 diagnosed a couple of weeks ago who presents with headache, chest pain, and a cough since Monday.  Headache feels like her typical migraines.  She denies any recent sick exposures. She says she can't take ibuprofen due to an ulcer, but she has taken Tylenol (last dose around 1300 today) and a migraine relief medication, neither of which have helped.     Review of Systems   Respiratory: Positive for cough.    Cardiovascular: Positive for chest pain.   Neurological: Positive for headaches.   All other systems reviewed and are negative.    Allergies:  Ampicillin  Aspirin  Chantix  Ciprofloxacin  Penicillins  Tessalon    Medications:  Tylenol  Norco  Toprol  Zofran  Albuterol    Past Medical History:     IBS  Pancreatitis  Morbid obesity  Migraines  Hypertension  Hyperlipidemia  Bipolar 1 disorder  Asthma  Endometriosis  Narcotic dependence  Furuncle    Past Surgical History:    Foot surgery  Cholecystectomy  Hysterectomy and tubal ligation     Family History:    Mother: Type 2 diabetes  Sister: Diabetes, asthma  Child: Diabetes, asthma  Brother: CAD (stent at 44), asthma  Father: Aneurysm, stroke    Social History:  The patient presents alone. She is a current everyday smoker.    Physical Exam     Patient Vitals for the past 24 hrs:   BP Temp Temp src Pulse Resp SpO2 Height Weight   12/15/21 2200 129/73 -- -- (!) 49 -- 98 % -- --   12/15/21 2145 (!) 150/80 -- -- (!) 49 -- 97 % -- --   12/15/21 2130 (!) 148/84 -- -- 52 -- 96 % -- --   12/15/21 2115 (!) 142/64 -- -- 52 -- 97 % -- --   12/15/21 2030 (!) 145/92 -- -- 63 -- 96 % -- --   12/15/21 2015 133/77 -- -- 53 -- 97 % -- --   12/15/21 2000 (!) 150/81 -- -- -- -- -- -- --   12/15/21 1932 120/52 -- -- 57 18 97 % -- --   12/15/21 1343 (!) 166/124 99.9  F (37.7  C) Temporal 59 16 98 % (!) 0.05 m (1.97\") 104.3 kg (230 lb)     Physical " Exam  Constitutional: Alert, attentive, GCS 15   Eyes: EOM are normal, anicteric, conjugate gaze  CV: distal extremities warm, well perfused  Chest: Non-labored breathing on RA  GI:  non tender. No distension. No guarding or rebound.    Neurological: Alert, attentive, moving all extremities equally.   Skin: Skin is warm and dry.        Emergency Department Course   Imaging:  XR Chest 2 Views:  Negative chest. Lungs are clear.  Report per radiology    Laboratory:  CBC: WBC 5.4, HGB 12.8,       BMP: Potassium 3.2 (L), Calcium 8.3 (L), Glucose 146 (H), o/w WNL (Creatinine 0.62)    Symptomatic Influenza A/B & SARS-CoV2 (COVID19) Virus PCR Multiplex: Positive (A)    Emergency Department Course:  Reviewed:  I reviewed nursing notes, vitals, past medical history and Care Everywhere    Assessments:   I obtained history and examined the patient as noted above.    Interventions:   Decadron, 10 mg, IV   Benadryl, 25 mg, IV   Zofran, 4 mg, IV    Disposition:  The patient was discharged to home.     Impression & Plan   Medical Decision Makin-year-old woman past medical history significant for hypertension, migraines, bipolar disorder presenting for evaluation of malaise, cough body aches and headache.  She tested positive for Covid 3 weeks ago and was positive here, I suspect this is persistent shedding and not new infection.  Influenza testing was negative.  Chest x-ray was clear, I suspect likely viral etiology given her constellation of symptoms.  I have low suspicion for CNS infection, she reports this is typical of her migraines and was improved with the above medications.  No indication for EGD imaging or LP.  Return precautions reviewed and she was discharged home.    Diagnosis:    ICD-10-CM    1. Viral illness  B34.9    2. Other migraine without status migrainosus, not intractable  G43.809      Trung Morales MD  [employer]  12:42 AM 21     [place]  Disclosure:  I, Joselin Llamas, am serving as a scribe at 8:31 PM on 12/15/2021 to document services personally performed by Trung Morales MD based on my observations and the provider's statements to me.         Trung Morales MD  12/16/21 0042

## 2021-12-16 NOTE — DISCHARGE INSTRUCTIONS
I suspect your Covid test remains positive due to your prior infection 1 month ago.  I suspect you likely have a viral illness, you should continue to stay well-hydrated, take Tylenol, her usual migraine medications.  I recommend following up with your primary doctor.  She develop worsening symptoms, you should return the emergency department.

## 2022-01-14 ENCOUNTER — HOSPITAL ENCOUNTER (EMERGENCY)
Facility: CLINIC | Age: 47
Discharge: HOME | End: 2022-01-14
Attending: PHYSICIAN ASSISTANT | Admitting: PHYSICIAN ASSISTANT

## 2022-01-14 VITALS
WEIGHT: 257.94 LBS | OXYGEN SATURATION: 96 % | DIASTOLIC BLOOD PRESSURE: 87 MMHG | TEMPERATURE: 98.4 F | BODY MASS INDEX: 50.64 KG/M2 | HEART RATE: 45 BPM | HEIGHT: 60 IN | RESPIRATION RATE: 18 BRPM | SYSTOLIC BLOOD PRESSURE: 151 MMHG

## 2022-01-14 DIAGNOSIS — R10.31 RLQ ABDOMINAL PAIN: ICD-10-CM

## 2022-01-14 LAB
ALBUMIN SERPL-MCNC: 3.9 G/DL (ref 3.4–5)
ALBUMIN UR-MCNC: NEGATIVE MG/DL
ALP SERPL-CCNC: 80 U/L (ref 40–150)
ALT SERPL W P-5'-P-CCNC: 69 U/L (ref 0–50)
ANION GAP SERPL CALCULATED.3IONS-SCNC: 2 MMOL/L (ref 3–14)
APPEARANCE UR: CLEAR
AST SERPL W P-5'-P-CCNC: 50 U/L (ref 0–45)
BACTERIA #/AREA URNS HPF: ABNORMAL /HPF
BASOPHILS # BLD AUTO: 0 10E3/UL (ref 0–0.2)
BASOPHILS NFR BLD AUTO: 1 %
BILIRUB SERPL-MCNC: 0.4 MG/DL (ref 0.2–1.3)
BILIRUB UR QL STRIP: NEGATIVE
BUN SERPL-MCNC: 9 MG/DL (ref 7–30)
CALCIUM SERPL-MCNC: 9 MG/DL (ref 8.5–10.1)
CHLORIDE BLD-SCNC: 106 MMOL/L (ref 94–109)
CO2 SERPL-SCNC: 30 MMOL/L (ref 20–32)
COLOR UR AUTO: YELLOW
CREAT SERPL-MCNC: 0.6 MG/DL (ref 0.52–1.04)
EOSINOPHIL # BLD AUTO: 0.1 10E3/UL (ref 0–0.7)
EOSINOPHIL NFR BLD AUTO: 2 %
ERYTHROCYTE [DISTWIDTH] IN BLOOD BY AUTOMATED COUNT: 13.2 % (ref 10–15)
GFR SERPL CREATININE-BSD FRML MDRD: >90 ML/MIN/1.73M2
GLUCOSE BLD-MCNC: 92 MG/DL (ref 70–99)
GLUCOSE UR STRIP-MCNC: NEGATIVE MG/DL
HCT VFR BLD AUTO: 45.5 % (ref 35–47)
HGB BLD-MCNC: 13.9 G/DL (ref 11.7–15.7)
HGB UR QL STRIP: NEGATIVE
IMM GRANULOCYTES # BLD: 0 10E3/UL
IMM GRANULOCYTES NFR BLD: 0 %
KETONES UR STRIP-MCNC: NEGATIVE MG/DL
LEUKOCYTE ESTERASE UR QL STRIP: NEGATIVE
LIPASE SERPL-CCNC: 62 U/L (ref 73–393)
LYMPHOCYTES # BLD AUTO: 3 10E3/UL (ref 0.8–5.3)
LYMPHOCYTES NFR BLD AUTO: 46 %
MCH RBC QN AUTO: 29.5 PG (ref 26.5–33)
MCHC RBC AUTO-ENTMCNC: 30.5 G/DL (ref 31.5–36.5)
MCV RBC AUTO: 97 FL (ref 78–100)
MONOCYTES # BLD AUTO: 0.4 10E3/UL (ref 0–1.3)
MONOCYTES NFR BLD AUTO: 5 %
MUCOUS THREADS #/AREA URNS LPF: PRESENT /LPF
NEUTROPHILS # BLD AUTO: 3.1 10E3/UL (ref 1.6–8.3)
NEUTROPHILS NFR BLD AUTO: 46 %
NITRATE UR QL: NEGATIVE
NRBC # BLD AUTO: 0 10E3/UL
NRBC BLD AUTO-RTO: 0 /100
PH UR STRIP: 5.5 [PH] (ref 5–7)
PLATELET # BLD AUTO: 376 10E3/UL (ref 150–450)
POTASSIUM BLD-SCNC: 3.7 MMOL/L (ref 3.4–5.3)
PROT SERPL-MCNC: 7.9 G/DL (ref 6.8–8.8)
RBC # BLD AUTO: 4.71 10E6/UL (ref 3.8–5.2)
RBC URINE: 1 /HPF
SODIUM SERPL-SCNC: 138 MMOL/L (ref 133–144)
SP GR UR STRIP: 1.02 (ref 1–1.03)
SQUAMOUS EPITHELIAL: 1 /HPF
UROBILINOGEN UR STRIP-MCNC: NORMAL MG/DL
WBC # BLD AUTO: 6.5 10E3/UL (ref 4–11)
WBC URINE: <1 /HPF

## 2022-01-14 PROCEDURE — 250N000011 HC RX IP 250 OP 636: Performed by: PHYSICIAN ASSISTANT

## 2022-01-14 PROCEDURE — 85025 COMPLETE CBC W/AUTO DIFF WBC: CPT | Performed by: PHYSICIAN ASSISTANT

## 2022-01-14 PROCEDURE — 82040 ASSAY OF SERUM ALBUMIN: CPT | Performed by: PHYSICIAN ASSISTANT

## 2022-01-14 PROCEDURE — 83690 ASSAY OF LIPASE: CPT | Performed by: PHYSICIAN ASSISTANT

## 2022-01-14 PROCEDURE — 36415 COLL VENOUS BLD VENIPUNCTURE: CPT | Performed by: PHYSICIAN ASSISTANT

## 2022-01-14 PROCEDURE — 96374 THER/PROPH/DIAG INJ IV PUSH: CPT

## 2022-01-14 PROCEDURE — 99284 EMERGENCY DEPT VISIT MOD MDM: CPT | Mod: 25

## 2022-01-14 PROCEDURE — 80053 COMPREHEN METABOLIC PANEL: CPT | Performed by: PHYSICIAN ASSISTANT

## 2022-01-14 PROCEDURE — 81001 URINALYSIS AUTO W/SCOPE: CPT | Performed by: PHYSICIAN ASSISTANT

## 2022-01-14 PROCEDURE — 96375 TX/PRO/DX INJ NEW DRUG ADDON: CPT

## 2022-01-14 RX ORDER — DICYCLOMINE HCL 20 MG
20 TABLET ORAL 4 TIMES DAILY PRN
Qty: 20 TABLET | Refills: 0 | Status: SHIPPED | OUTPATIENT
Start: 2022-01-14 | End: 2022-01-24

## 2022-01-14 RX ORDER — ONDANSETRON 4 MG/1
4 TABLET, ORALLY DISINTEGRATING ORAL EVERY 8 HOURS PRN
Qty: 10 TABLET | Refills: 0 | Status: SHIPPED | OUTPATIENT
Start: 2022-01-14 | End: 2022-01-17

## 2022-01-14 RX ORDER — KETOROLAC TROMETHAMINE 15 MG/ML
15 INJECTION, SOLUTION INTRAMUSCULAR; INTRAVENOUS ONCE
Status: COMPLETED | OUTPATIENT
Start: 2022-01-14 | End: 2022-01-14

## 2022-01-14 RX ORDER — ONDANSETRON 2 MG/ML
4 INJECTION INTRAMUSCULAR; INTRAVENOUS ONCE
Status: COMPLETED | OUTPATIENT
Start: 2022-01-14 | End: 2022-01-14

## 2022-01-14 RX ADMIN — ONDANSETRON 4 MG: 2 INJECTION INTRAMUSCULAR; INTRAVENOUS at 16:31

## 2022-01-14 RX ADMIN — KETOROLAC TROMETHAMINE 15 MG: 15 INJECTION, SOLUTION INTRAMUSCULAR; INTRAVENOUS at 16:31

## 2022-01-14 ASSESSMENT — ENCOUNTER SYMPTOMS
VOMITING: 1
ABDOMINAL PAIN: 1
APPETITE CHANGE: 1
NAUSEA: 1

## 2022-01-14 ASSESSMENT — MIFFLIN-ST. JEOR: SCORE: 1736.5

## 2022-01-14 NOTE — ED PROVIDER NOTES
History   Chief Complaint:  Abdominal Pain and Nausea & Vomiting       The history is provided by the patient.      Adriane Garrett is a 46 year old female with history of cholecystectomy, hysterectomy, and chronic abdominal pain who presents with abdominal pain, nausea, and vomiting which she reports began yesterday.  She compares the pain to contractions and says that this is not similar to her chronic abdominal pain, noting that this new pain is much lower. Eating mashed potatoes last evening seemed to worsen her pain and resulted in nausea and vomiting. Subsequently she has had decreased oral intake. She tried tylenol last night around 2200 and ibuprofen around 0800 this morning for the pain but received little relief. At bedside she says that her pain is still persisting and that she has had a steady intake of fluids.  The time of the exam, the patient denies chest pain, shortness of breath, urinary symptoms, abnormal vaginal discharge, back pain, neck pain, or any other medical concerns.  Of note, the patient had a CT of her abdomen and pelvis on 10-6-2021 revealed a normal appendix.  Additionally,  [id number] point-of-care ultrasound of her kidneys which was normal.    Review of Systems   Constitutional: Positive for appetite change.   Gastrointestinal: Positive for abdominal pain, nausea and vomiting.   All other systems reviewed and are negative.    Allergies:  Ampicillin  Aspirin  Chantix [Varenicline]  Ciprofloxacin  Penicillins  Tessalon [Benzonatate]    Medications:  Albuterol     Past Medical History:     IBS  Vitamin D deficiency  Smoker  Pancreatitis   Chronic nonalcoholic liver disease  Morbid obesity  IBS  Migraine headache  HTN  Hyperlipidemia  GERD  Epigastric pain  Bipolar 1 disorder  Asthma    Past Surgical History:    Foot surgery  Cholecystectomy  Hysterectomy and tubal ligation    Family History:    Diabetes  Coronary stent with Heart disease  Cerebrovascular disease  Aneurysm    Social  History:  Patient unaccompanied  PCP: No Ref-Primary, Physician     Physical Exam     Patient Vitals for the past 24 hrs:   BP Temp Temp src Pulse Resp SpO2 Height Weight   01/14/22 1800 (!) 151/87 -- -- (!) 45 -- 96 % -- --   01/14/22 1730 (!) 141/65 -- -- (!) 47 -- 98 % -- --   01/14/22 1715 (!) 141/65 -- -- (!) 49 -- 98 % -- --   01/14/22 1700 (!) 145/73 -- -- (!) 49 -- 96 % -- --   01/14/22 1635 (!) 182/98 -- -- 53 -- 97 % -- --   01/14/22 1305 (!) 156/84 98.4  F (36.9  C) Oral 53 18 99 % 1.524 m (5') 117 kg (257 lb 15 oz)       Physical Exam  Vitals signs and nursing note reviewed.   HENT:      Nose: Nose normal. No congestion or rhinorrhea.      Mouth/Throat:      Mouth: Mucous membranes are moist.      Pharynx: Oropharynx is clear. No oropharyngeal exudate or posterior oropharyngeal erythema.   Eyes:      General: No scleral icterus.     Extraocular Movements: Extraocular movements intact.      Conjunctiva/sclera: Conjunctivae normal.      Pupils: Pupils are equal, round, and reactive to light.   Cardiovascular:      Rate and Rhythm: Regular rhythm. Normal Rate.     Pulses: Normal pulses.      Heart sounds: Normal heart sounds.   Pulmonary:      Effort: Pulmonary effort is normal.      Breath sounds: Normal breath sounds.   Abdominal:      General: Abdomen is flat. Bowel sounds are normal.      Palpations: Abdomen is soft.      Tenderness: Minimal RLQ pain. No rebound or guarding.   Musculoskeletal: Normal range of motion.   Skin:     General: Skin is warm and dry. No rashes, lesions, or open areas on exposed skin.   Neurological:      Mental Status: Alert. Speech normal. Responds appropriately to questions.   Psychiatric:         Mood and Affect: Mood normal.         Behavior: Behavior normal.       Emergency Department Course     Laboratory:  Labs Ordered and Resulted from Time of ED Arrival to Time of ED Departure   COMPREHENSIVE METABOLIC PANEL - Abnormal       Result Value    Sodium 138      Potassium  3.7      Chloride 106      Carbon Dioxide (CO2) 30      Anion Gap 2 (*)     Urea Nitrogen 9      Creatinine 0.60      Calcium 9.0      Glucose 92      Alkaline Phosphatase 80      AST 50 (*)     ALT 69 (*)     Protein Total 7.9      Albumin 3.9      Bilirubin Total 0.4      GFR Estimate >90     LIPASE - Abnormal    Lipase 62 (*)    CBC WITH PLATELETS AND DIFFERENTIAL - Abnormal    WBC Count 6.5      RBC Count 4.71      Hemoglobin 13.9      Hematocrit 45.5      MCV 97      MCH 29.5      MCHC 30.5 (*)     RDW 13.2      Platelet Count 376      % Neutrophils 46      % Lymphocytes 46      % Monocytes 5      % Eosinophils 2      % Basophils 1      % Immature Granulocytes 0      NRBCs per 100 WBC 0      Absolute Neutrophils 3.1      Absolute Lymphocytes 3.0      Absolute Monocytes 0.4      Absolute Eosinophils 0.1      Absolute Basophils 0.0      Absolute Immature Granulocytes 0.0      Absolute NRBCs 0.0     ROUTINE UA WITH MICROSCOPIC REFLEX TO CULTURE - Abnormal    Color Urine Yellow      Appearance Urine Clear      Glucose Urine Negative      Bilirubin Urine Negative      Ketones Urine Negative      Specific Gravity Urine 1.024      Blood Urine Negative      pH Urine 5.5      Protein Albumin Urine Negative      Urobilinogen Urine Normal      Nitrite Urine Negative      Leukocyte Esterase Urine Negative      Bacteria Urine Few (*)     Mucus Urine Present (*)     RBC Urine 1      WBC Urine <1      Squamous Epithelials Urine 1          Procedures  None    Emergency Department Course:    Reviewed:  I reviewed nursing notes, vitals, past medical history and Care Everywhere    Assessments/Consults:  ED Course as of 01/14/22 1751 Fri Jan 14, 2022   1547 Obtained history and examined the patient as noted above   1640 Rechecked the patient. She reports to feeling a little better.   1740 Rechecked the patient      Interventions:  1631 Toradol 15 mg, IV  1631 Zofran 4 mg, IV    Disposition:  The patient was discharged to home.      Impression & Plan     CMS Diagnoses: None    Medical Decision Making:  Adriane Garrett is a 46-year-old female who presents to the emergency department for evaluation of right lower quadrant abdominal pain.  See HPI for additional details.  Vitals were reviewed.  Patient afebrile and hemodynamically stable.  On physical exam, the patient had minimal right lower quadrant tenderness.  There is no rebound or guarding. Labs were obtained and reviewed.  CBC demonstrated no evidence of leukocytosis.  Hemoglobin stable at 13.9.  CMP overall unremarkable.  Lipase 62.  UA negative for acute infection or hematuria making my suspicion for ureteral stone though.  As noted above, the patient did have bilateral for oophorectomy and hysterectomy as well as cholecystectomy.  Appendicitis was considered however unlikely in this clinical presentation given that she is afebrile, no evidence of leukocytosis, and minimal right lower quadrant tenderness.  I did have a lengthy discussion with the patient regarding options to evaluate the appendix including ultrasound versus CT.  I did review the patient's care plan and it appears that she has had numerous CT scans over the last few years.  The patient opted to defer any imaging at this time.  While in the emergency department, the patient was given a one-time dose of Toradol and heat packs.  On recheck, she noted improvement in her presenting symptoms.  On repeat exam, the patient had no right lower quadrant abdominal tenderness therefore my suspicion for an acute intra-abdominal catastrophe is low.  Is observed tolerating oral intake without difficulty.  The patient appeared well and remained hemodynamically stable therefore I felt that she was safe for discharge home. I did discuss the sometimes vague initial constellation of symptoms early in the course of acute abdominal processes, and the need for immediate return should pain or other concerning symptoms develop.  Courage the  patient to follow-up with her primary care provider/GI specialist for further evaluation in the setting of chronic abdominal pain.  Strict return precautions were discussed.  All questions and concerns were addressed prior to discharge        Diagnosis:    ICD-10-CM    1. RLQ abdominal pain  R10.31        Discharge Medications:  Discharge Medication List as of 1/14/2022  6:01 PM      START taking these medications    Details   dicyclomine (BENTYL) 20 MG tablet Take 1 tablet (20 mg) by mouth 4 times daily as needed (Abdominal pain), Disp-20 tablet, R-0, E-Prescribe      ondansetron (ZOFRAN ODT) 4 MG ODT tab Take 1 tablet (4 mg) by mouth every 8 hours as needed for nausea, Disp-10 tablet, R-0, E-Prescribe             Scribe Disclosure:  I, Antonio Hampton, am serving as a scribe at 3:46 PM on 1/14/2022 to document services personally performed by Tawny Friedman PA-C based on my observations and the provider's statements to me.             Tawny Friedman PA-C  01/14/22 1824

## 2022-01-14 NOTE — ED TRIAGE NOTES
Arrives from home. States she is having more abdominal pain. Started yesterday morning. Lower right side. States took OTC medications without relief. States it feels like a contraction, but had a hysterectomy so no concerns for pregnancy. Last bowel movement this AM, states it was soft, states has been having regular bowel movements. States no urinary concerns.     States nauseate and vomiting.

## 2022-01-16 ENCOUNTER — HEALTH MAINTENANCE LETTER (OUTPATIENT)
Age: 47
End: 2022-01-16

## 2022-03-24 ENCOUNTER — HOSPITAL ENCOUNTER (EMERGENCY)
Facility: CLINIC | Age: 47
Discharge: HOME | End: 2022-03-24
Attending: EMERGENCY MEDICINE | Admitting: EMERGENCY MEDICINE

## 2022-03-24 VITALS
TEMPERATURE: 97.8 F | DIASTOLIC BLOOD PRESSURE: 65 MMHG | OXYGEN SATURATION: 97 % | HEART RATE: 52 BPM | SYSTOLIC BLOOD PRESSURE: 145 MMHG | RESPIRATION RATE: 18 BRPM

## 2022-03-24 DIAGNOSIS — R10.31 ABDOMINAL PAIN, RIGHT LOWER QUADRANT: ICD-10-CM

## 2022-03-24 LAB
ALBUMIN SERPL-MCNC: 3.5 G/DL (ref 3.4–5)
ALBUMIN UR-MCNC: NEGATIVE MG/DL
ALP SERPL-CCNC: 101 U/L (ref 40–150)
ALT SERPL W P-5'-P-CCNC: 49 U/L (ref 0–50)
ANION GAP SERPL CALCULATED.3IONS-SCNC: 3 MMOL/L (ref 3–14)
APPEARANCE UR: CLEAR
AST SERPL W P-5'-P-CCNC: 29 U/L (ref 0–45)
BASOPHILS # BLD AUTO: 0 10E3/UL (ref 0–0.2)
BASOPHILS NFR BLD AUTO: 0 %
BILIRUB SERPL-MCNC: 0.2 MG/DL (ref 0.2–1.3)
BILIRUB UR QL STRIP: NEGATIVE
BUN SERPL-MCNC: 7 MG/DL (ref 7–30)
CALCIUM SERPL-MCNC: 9.1 MG/DL (ref 8.5–10.1)
CHLORIDE BLD-SCNC: 106 MMOL/L (ref 94–109)
CO2 SERPL-SCNC: 30 MMOL/L (ref 20–32)
COLOR UR AUTO: ABNORMAL
CREAT SERPL-MCNC: 0.68 MG/DL (ref 0.52–1.04)
EOSINOPHIL # BLD AUTO: 0.1 10E3/UL (ref 0–0.7)
EOSINOPHIL NFR BLD AUTO: 2 %
ERYTHROCYTE [DISTWIDTH] IN BLOOD BY AUTOMATED COUNT: 13.2 % (ref 10–15)
GFR SERPL CREATININE-BSD FRML MDRD: >90 ML/MIN/1.73M2
GLUCOSE BLD-MCNC: 139 MG/DL (ref 70–99)
GLUCOSE UR STRIP-MCNC: NEGATIVE MG/DL
HCT VFR BLD AUTO: 43.2 % (ref 35–47)
HGB BLD-MCNC: 13.3 G/DL (ref 11.7–15.7)
HGB UR QL STRIP: ABNORMAL
HOLD SPECIMEN: NORMAL
HOLD SPECIMEN: NORMAL
HYALINE CASTS: 1 /LPF
IMM GRANULOCYTES # BLD: 0 10E3/UL
IMM GRANULOCYTES NFR BLD: 0 %
KETONES UR STRIP-MCNC: NEGATIVE MG/DL
LEUKOCYTE ESTERASE UR QL STRIP: NEGATIVE
LIPASE SERPL-CCNC: 74 U/L (ref 73–393)
LYMPHOCYTES # BLD AUTO: 2.6 10E3/UL (ref 0.8–5.3)
LYMPHOCYTES NFR BLD AUTO: 41 %
MCH RBC QN AUTO: 29.8 PG (ref 26.5–33)
MCHC RBC AUTO-ENTMCNC: 30.8 G/DL (ref 31.5–36.5)
MCV RBC AUTO: 97 FL (ref 78–100)
MONOCYTES # BLD AUTO: 0.4 10E3/UL (ref 0–1.3)
MONOCYTES NFR BLD AUTO: 7 %
MUCOUS THREADS #/AREA URNS LPF: PRESENT /LPF
NEUTROPHILS # BLD AUTO: 3.1 10E3/UL (ref 1.6–8.3)
NEUTROPHILS NFR BLD AUTO: 50 %
NITRATE UR QL: NEGATIVE
NRBC # BLD AUTO: 0 10E3/UL
NRBC BLD AUTO-RTO: 0 /100
PH UR STRIP: 5.5 [PH] (ref 5–7)
PLATELET # BLD AUTO: 357 10E3/UL (ref 150–450)
POTASSIUM BLD-SCNC: 3.9 MMOL/L (ref 3.4–5.3)
PROT SERPL-MCNC: 7.7 G/DL (ref 6.8–8.8)
RBC # BLD AUTO: 4.47 10E6/UL (ref 3.8–5.2)
RBC URINE: <1 /HPF
SODIUM SERPL-SCNC: 139 MMOL/L (ref 133–144)
SP GR UR STRIP: 1.02 (ref 1–1.03)
SQUAMOUS EPITHELIAL: <1 /HPF
UROBILINOGEN UR STRIP-MCNC: NORMAL MG/DL
WBC # BLD AUTO: 6.3 10E3/UL (ref 4–11)
WBC URINE: 1 /HPF

## 2022-03-24 PROCEDURE — 80053 COMPREHEN METABOLIC PANEL: CPT | Performed by: EMERGENCY MEDICINE

## 2022-03-24 PROCEDURE — 81001 URINALYSIS AUTO W/SCOPE: CPT | Performed by: EMERGENCY MEDICINE

## 2022-03-24 PROCEDURE — 99284 EMERGENCY DEPT VISIT MOD MDM: CPT | Mod: 25

## 2022-03-24 PROCEDURE — 85025 COMPLETE CBC W/AUTO DIFF WBC: CPT | Performed by: EMERGENCY MEDICINE

## 2022-03-24 PROCEDURE — 250N000011 HC RX IP 250 OP 636: Performed by: EMERGENCY MEDICINE

## 2022-03-24 PROCEDURE — 83690 ASSAY OF LIPASE: CPT | Performed by: EMERGENCY MEDICINE

## 2022-03-24 PROCEDURE — 36415 COLL VENOUS BLD VENIPUNCTURE: CPT | Performed by: EMERGENCY MEDICINE

## 2022-03-24 RX ORDER — KETOROLAC TROMETHAMINE 15 MG/ML
15 INJECTION, SOLUTION INTRAMUSCULAR; INTRAVENOUS ONCE
Status: DISCONTINUED | OUTPATIENT
Start: 2022-03-24 | End: 2022-03-24

## 2022-03-24 RX ORDER — KETOROLAC TROMETHAMINE 15 MG/ML
15 INJECTION, SOLUTION INTRAMUSCULAR; INTRAVENOUS ONCE
Status: COMPLETED | OUTPATIENT
Start: 2022-03-24 | End: 2022-03-24

## 2022-03-24 RX ADMIN — KETOROLAC TROMETHAMINE 15 MG: 15 INJECTION, SOLUTION INTRAMUSCULAR; INTRAVENOUS at 20:56

## 2022-03-24 ASSESSMENT — ENCOUNTER SYMPTOMS
NAUSEA: 1
VOMITING: 1
BLOOD IN STOOL: 0
ABDOMINAL PAIN: 1
DYSURIA: 0
DIARRHEA: 1
FEVER: 0
HEMATURIA: 0
FREQUENCY: 1

## 2022-03-25 NOTE — ED TRIAGE NOTES
Pt reports that she started having RLQ abdominal pain that started last night around 2200, pt reports that she is also having N/V/D pt states that she has a hx of gallbladder removal and hysterectomy. PT VSS and ABC's intact. PT denies blood in stool and emesis at this time

## 2022-03-25 NOTE — ED PROVIDER NOTES
History   Chief Complaint:  Abdominal Pain      HPI   Adriane Garrett is a 46 year old female with a history of IBS, chronic abdominal pain, who presents for evaluation of abdominal pain. Yesterday evening around 2000 the patient started to develop some diarrhea. Around 2200 she started to develop right-sided abdominal pain with associated nausea and vomiting. Her pain is worse with movement and after eating. Since then her pain has been present constantly, prompting her to come into the ED for evaluation tonight. She denies any fever, hematemesis, bloody stools, dysuria, or hematuria, although she notes she has had some urinary frequency. She has had a cholecystectomy and total hysterectomy.     Review of Systems   Constitutional: Negative for fever.   Gastrointestinal: Positive for abdominal pain, diarrhea, nausea and vomiting. Negative for blood in stool.   Genitourinary: Positive for frequency. Negative for dysuria and hematuria.   All other systems reviewed and are negative.      Allergies:  Ampicillin  Aspirin  Chantix   Ciprofloxacin  Penicillins  Tessalon     Medications:  Albuterol inhaler     Past Medical History:     Unique Care Plan for abdominal pain, reviewed.  Irritable bowel syndrome  Pancreatitis  Chronic nonalcoholic liver disease   Obesity   Migraine   Hypertension  Hyperlipidemia  GERD   Bipolar 1 disorder  Asthma      Past Surgical History:    Foot surgery  Cholecystectomy  Hysterectomy total abdominal, bilateral salpingo-oophorectomy, combined      Family History:    Diabetes - Mother and sister   CAD - Brother   Aneurysm - Father     Social History:  The patient presents to the ED alone.     Physical Exam     Patient Vitals for the past 24 hrs:   BP Temp Temp src Pulse Resp SpO2   03/24/22 2055 (!) 145/65 -- -- 52 -- 97 %   03/24/22 1905 (!) 190/84 -- -- -- -- --   03/24/22 1903 -- 97.8  F (36.6  C) Temporal 56 18 100 %       Physical Exam  Constitutional: Patient is well appearing. No  distress.  Head: Atraumatic.  Mouth/Throat: Oropharynx is clear and moist. No oropharyngeal exudate.  Eyes: Conjunctivae and EOM are normal. No scleral icterus.  Neck: Normal range of motion. Neck supple.   Cardiovascular: Normal rate, regular rhythm, normal heart sounds and intact distal pulses.   Pulmonary/Chest: Breath sounds normal. No respiratory distress.  Abdominal: Soft. Bowel sounds are normal. No distension. No tenderness. No rebound or guarding. Jumped at the touching of her shirt over the right lower quadrant.   Musculoskeletal: Normal range of motion. No edema or tenderness.   Neurological: Alert and orientated to person, place, and time. No observable focal neuro deficit  Skin: Warm and dry. No rash noted. Not diaphoretic.      Emergency Department Course     Laboratory:  Labs Ordered and Resulted from Time of ED Arrival to Time of ED Departure   COMPREHENSIVE METABOLIC PANEL - Abnormal       Result Value    Sodium 139      Potassium 3.9      Chloride 106      Carbon Dioxide (CO2) 30      Anion Gap 3      Urea Nitrogen 7      Creatinine 0.68      Calcium 9.1      Glucose 139 (*)     Alkaline Phosphatase 101      AST 29      ALT 49      Protein Total 7.7      Albumin 3.5      Bilirubin Total 0.2      GFR Estimate >90     ROUTINE UA WITH MICROSCOPIC REFLEX TO CULTURE - Abnormal    Color Urine Light Yellow      Appearance Urine Clear      Glucose Urine Negative      Bilirubin Urine Negative      Ketones Urine Negative      Specific Gravity Urine 1.024      Blood Urine Trace (*)     pH Urine 5.5      Protein Albumin Urine Negative      Urobilinogen Urine Normal      Nitrite Urine Negative      Leukocyte Esterase Urine Negative      Mucus Urine Present (*)     RBC Urine <1      WBC Urine 1      Squamous Epithelials Urine <1      Hyaline Casts Urine 1     CBC WITH PLATELETS AND DIFFERENTIAL - Abnormal    WBC Count 6.3      RBC Count 4.47      Hemoglobin 13.3      Hematocrit 43.2      MCV 97      MCH 29.8       MCHC 30.8 (*)     RDW 13.2      Platelet Count 357      % Neutrophils 50      % Lymphocytes 41      % Monocytes 7      % Eosinophils 2      % Basophils 0      % Immature Granulocytes 0      NRBCs per 100 WBC 0      Absolute Neutrophils 3.1      Absolute Lymphocytes 2.6      Absolute Monocytes 0.4      Absolute Eosinophils 0.1      Absolute Basophils 0.0      Absolute Immature Granulocytes 0.0      Absolute NRBCs 0.0     LIPASE - Normal    Lipase 74          Emergency Department Course:     Reviewed:  I reviewed nursing notes, vitals and past medical history    Assessments:  2041:  I obtained history and examined the patient as noted above.     2055: I updated and reassessed the patient.     Interventions:  2056 Toradol 15 mg IV     Disposition:  The patient was discharged to home.     Impression & Plan     Medical Decision Making:   Adriane Garrett is a 46 year old female who presents with abdominal pain, vomiting, and diarrhea.  She looks overall well and without a concerning etiology of abdominal pain.  A broad differential diagnosis was of course considered.    The workup in the ED is at this point negative.  No etiology for the patients pain is found at this point and my suspicion of an intraabdominal catastrophe or other worrisome etiology is very low.   Lab work are reassuring.    I will not therefore admit her for serial exams and further workup.  Patient is hemodynamically stable in ED. Plan is home with abdominal pain recheck by primary care physician or return to ED at anytime.  Return for fevers greater than 102, increasing pain, other new symptoms develop.  Abdominal pain handout given.  All questions and concerns were answered. The patient was discharged home and recommended to follow up with her primary physician and return with any new or worsening symptoms.          Diagnosis:    ICD-10-CM    1. Abdominal pain, right lower quadrant  R10.31         Scribe Disclosure:  SKYE, Yordan Queen, am serving  as a scribe at 8:41 PM on 3/24/2022 to document services personally performed by Carlos Chong MD based on my observations and the provider's statements to me.           Carlos Chong MD  03/24/22 9723

## 2022-04-19 ENCOUNTER — HOSPITAL ENCOUNTER (EMERGENCY)
Facility: CLINIC | Age: 47
Discharge: HOME | End: 2022-04-19
Attending: EMERGENCY MEDICINE | Admitting: EMERGENCY MEDICINE

## 2022-04-19 VITALS
HEART RATE: 58 BPM | TEMPERATURE: 98.1 F | DIASTOLIC BLOOD PRESSURE: 109 MMHG | OXYGEN SATURATION: 97 % | RESPIRATION RATE: 19 BRPM | SYSTOLIC BLOOD PRESSURE: 138 MMHG

## 2022-04-19 DIAGNOSIS — R03.0 ELEVATED BLOOD PRESSURE READING WITHOUT DIAGNOSIS OF HYPERTENSION: ICD-10-CM

## 2022-04-19 DIAGNOSIS — R07.9 CHEST PAIN, UNSPECIFIED TYPE: ICD-10-CM

## 2022-04-19 LAB
ANION GAP SERPL CALCULATED.3IONS-SCNC: 6 MMOL/L (ref 3–14)
BASOPHILS # BLD AUTO: 0 10E3/UL (ref 0–0.2)
BASOPHILS NFR BLD AUTO: 1 %
BUN SERPL-MCNC: 11 MG/DL (ref 7–30)
CALCIUM SERPL-MCNC: 8.8 MG/DL (ref 8.5–10.1)
CHLORIDE BLD-SCNC: 109 MMOL/L (ref 94–109)
CO2 SERPL-SCNC: 26 MMOL/L (ref 20–32)
CREAT SERPL-MCNC: 0.58 MG/DL (ref 0.52–1.04)
EOSINOPHIL # BLD AUTO: 0.1 10E3/UL (ref 0–0.7)
EOSINOPHIL NFR BLD AUTO: 2 %
ERYTHROCYTE [DISTWIDTH] IN BLOOD BY AUTOMATED COUNT: 13.1 % (ref 10–15)
GFR SERPL CREATININE-BSD FRML MDRD: >90 ML/MIN/1.73M2
GLUCOSE BLD-MCNC: 135 MG/DL (ref 70–99)
HCT VFR BLD AUTO: 39.8 % (ref 35–47)
HGB BLD-MCNC: 12.8 G/DL (ref 11.7–15.7)
HOLD SPECIMEN: NORMAL
HOLD SPECIMEN: NORMAL
IMM GRANULOCYTES # BLD: 0.1 10E3/UL
IMM GRANULOCYTES NFR BLD: 1 %
LYMPHOCYTES # BLD AUTO: 3.5 10E3/UL (ref 0.8–5.3)
LYMPHOCYTES NFR BLD AUTO: 49 %
MCH RBC QN AUTO: 30.1 PG (ref 26.5–33)
MCHC RBC AUTO-ENTMCNC: 32.2 G/DL (ref 31.5–36.5)
MCV RBC AUTO: 94 FL (ref 78–100)
MONOCYTES # BLD AUTO: 0.4 10E3/UL (ref 0–1.3)
MONOCYTES NFR BLD AUTO: 7 %
NEUTROPHILS # BLD AUTO: 2.7 10E3/UL (ref 1.6–8.3)
NEUTROPHILS NFR BLD AUTO: 40 %
NRBC # BLD AUTO: 0 10E3/UL
NRBC BLD AUTO-RTO: 0 /100
PLATELET # BLD AUTO: 362 10E3/UL (ref 150–450)
POTASSIUM BLD-SCNC: 3.7 MMOL/L (ref 3.4–5.3)
RBC # BLD AUTO: 4.25 10E6/UL (ref 3.8–5.2)
SODIUM SERPL-SCNC: 141 MMOL/L (ref 133–144)
TROPONIN I SERPL HS-MCNC: 4 NG/L
WBC # BLD AUTO: 6.8 10E3/UL (ref 4–11)

## 2022-04-19 PROCEDURE — 250N000011 HC RX IP 250 OP 636: Performed by: EMERGENCY MEDICINE

## 2022-04-19 PROCEDURE — 80048 BASIC METABOLIC PNL TOTAL CA: CPT | Performed by: EMERGENCY MEDICINE

## 2022-04-19 PROCEDURE — 84484 ASSAY OF TROPONIN QUANT: CPT | Performed by: EMERGENCY MEDICINE

## 2022-04-19 PROCEDURE — 99284 EMERGENCY DEPT VISIT MOD MDM: CPT | Mod: 25

## 2022-04-19 PROCEDURE — 85025 COMPLETE CBC W/AUTO DIFF WBC: CPT | Performed by: EMERGENCY MEDICINE

## 2022-04-19 PROCEDURE — 36415 COLL VENOUS BLD VENIPUNCTURE: CPT | Performed by: EMERGENCY MEDICINE

## 2022-04-19 PROCEDURE — 93005 ELECTROCARDIOGRAM TRACING: CPT

## 2022-04-19 RX ORDER — KETOROLAC TROMETHAMINE 15 MG/ML
15 INJECTION, SOLUTION INTRAMUSCULAR; INTRAVENOUS ONCE
Status: COMPLETED | OUTPATIENT
Start: 2022-04-19 | End: 2022-04-19

## 2022-04-19 RX ADMIN — KETOROLAC TROMETHAMINE 15 MG: 15 INJECTION, SOLUTION INTRAMUSCULAR; INTRAVENOUS at 20:01

## 2022-04-19 ASSESSMENT — ENCOUNTER SYMPTOMS
SHORTNESS OF BREATH: 0
NAUSEA: 0
COUGH: 0
BACK PAIN: 1
VOMITING: 0
FEVER: 0

## 2022-04-19 NOTE — ED TRIAGE NOTES
Pt reports that she started having CP at 5 pm  Last night that worsens with deep breathing. PT reports that she was sitting in the car when it happens. PT VSS and ABC's intact. PT states that she took tylenol and ibuprofen at noon today with no relief.

## 2022-04-20 LAB
ATRIAL RATE - MUSE: 58 BPM
DIASTOLIC BLOOD PRESSURE - MUSE: NORMAL MMHG
INTERPRETATION ECG - MUSE: NORMAL
P AXIS - MUSE: 42 DEGREES
PR INTERVAL - MUSE: 162 MS
QRS DURATION - MUSE: 104 MS
QT - MUSE: 428 MS
QTC - MUSE: 420 MS
R AXIS - MUSE: 15 DEGREES
SYSTOLIC BLOOD PRESSURE - MUSE: NORMAL MMHG
T AXIS - MUSE: 56 DEGREES
VENTRICULAR RATE- MUSE: 58 BPM

## 2022-04-20 NOTE — DISCHARGE INSTRUCTIONS
Please make an appointment to follow up with your primary care provider in 2-3 days even if entirely better.    Discharge Instructions  Chest Pain    You have been seen today for chest pain or discomfort.  At this time, your provider has found no signs that your chest pain is due to a serious or life-threatening condition, (or you have declined more testing and/or admission to the hospital). However, sometimes there is a serious problem that does not show up right away. Your evaluation today may not be complete and you may need further testing and evaluation.     Generally, every Emergency Department visit should have a follow-up clinic visit with either a primary or a specialty clinic/provider. Please follow-up as instructed by your emergency provider today.  Return to the Emergency Department if:  Your chest pain changes, gets worse, starts to happen more often, or comes with less activity.  You are newly short of breath.  You get very weak or tired.  You pass out or faint.  You have any new symptoms, like fever, cough, numb legs, or you cough up blood.  You have anything else that worries you.    Until you follow-up with your regular provider, please do the following:  Take one aspirin daily unless you have an allergy or are told not to by your provider.  If a stress test appointment has been made, go to the appointment.  If you have questions, contact your regular provider.  Follow-up with your regular provider/clinic as directed; this is very important.    If you were given a prescription for medicine here today, be sure to read all of the information (including the package insert) that comes with your prescription.  This will include important information about the medicine, its side effects, and any warnings that you need to know about.  The pharmacist who fills the prescription can provide more information and answer questions you may have about the medicine.  If you have questions or concerns that the  pharmacist cannot address, please call or return to the Emergency Department.       Remember that you can always come back to the Emergency Department if you are not able to see your regular provider in the amount of time listed above, if you get any new symptoms, or if there is anything that worries you.  Discharge Instructions  Hypertension - High Blood Pressure    During you visit to the Emergency Department, your blood pressure was higher than the recommended blood pressure.  This may be related to stress, pain, medication or other temporary conditions. In these cases, your blood pressure may return to normal on its own. If you have a history of high blood pressure, you may need to have your provider adjust your medications. Sometimes, your high measurement here may indicate that you have developed high blood pressure that will stay high unless it is treated. As a general rule, high blood pressure causes problems over years rather than days, weeks, or months. So, while it is important to treat blood pressure, it is rarely important to treat blood pressure immediately. Occasionally we will begin a medication in the Emergency Department; more often we will recommend close follow-up for medications with a primary doctor/clinic.    Generally, every Emergency Department visit should have a follow-up clinic visit with either a primary or a specialty clinic/provider. Please follow-up as instructed by your emergency provider today.    Return to the Emergency Department if you start to have:  A severe headache.  Chest pain.  Shortness of breath.  Weakness or numbness that affects one part of the body.  Confusion.  Vision changes.  Significant swelling of legs and/or eyes.  A reaction to any medication started in the Emergency Department.    What can I do to help myself?  Avoid alcohol.  Take any blood pressure medicine that you are prescribed.  Get a good night s sleep.  Lower your salt intake.  Exercise.  Lose  weight.  Manage stress.  See your doctor regularly    If blood pressure medication was started in the Emergency Department:  The medicine may not have an immediate effect. The body and brain determine what blood pressure you have. The medicine s job is to retrain the body s  thermostat  to a lower blood pressure.  You will need to follow up with your provider to see how this medicine is working for you.  If you were given a prescription for medicine here today, be sure to read all of the information (including the package insert) that comes with your prescription.  This will include important information about the medicine, its side effects, and any warnings that you need to know about.  The pharmacist who fills the prescription can provide more information and answer questions you may have about the medicine.  If you have questions or concerns that the pharmacist cannot address, please call or return to the Emergency Department.   Remember that you can always come back to the Emergency Department if you are not able to see your regular provider in the amount of time listed above, if you get any new symptoms, or if there is anything that worries you.

## 2022-04-20 NOTE — ED PROVIDER NOTES
History   Chief Complaint:  Chest Pain      HPI   Adriane Garrett is a 46 year old female with history of hypertension and hyperlipidemia who presents for evaluation of chest pain. Yesterday evening around 1700 the patient was sitting in her car when she started to develop pain in her right upper chest. This pain has been present constantly, radiates somewhat towards the back, and is worse with deep breathing. She has been taking Tylenol and ibuprofen at home with limited improvement of her pain, and she last took these around 1200 today. Due to her ongoing pain this evening she came into the ED for evaluation. She denies any recent fever, cough, shortness of breath, leg swelling, nausea, or vomiting. She is not on birth control, has not traveled or had surgery recently, and has no history of DVT, PE, hemoptysis, malignancy, recent immobilization or estrogen use.    Review of Systems   Constitutional: Negative for fever.   Respiratory: Negative for cough and shortness of breath.    Cardiovascular: Positive for chest pain. Negative for leg swelling.   Gastrointestinal: Negative for nausea and vomiting.   Musculoskeletal: Positive for back pain.   All other systems reviewed and are negative.      Allergies:  Ampicillin  Aspirin  Chantix   Ciprofloxacin  Penicillins  Tessalon     Medications:  Albuterol inhaler     Past Medical History:     Irritable bowel syndrome  Hypertension  Hyperlipidemia  Pancreatitis  Chronic nonalcoholic liver disease   Migraine  GERD   Bipolar I disorder   Asthma       Past Surgical History:    Foot surgery right  Cholecystectomy   Hysterectomy and tubal ligation     Family History:    Diabetes - Mother and sister   Heart disease - Brother   Aneurysm - Father     Social History:  The patient presents to the ED alone.   Marital status:      Physical Exam     Patient Vitals for the past 24 hrs:   BP Temp Temp src Pulse Resp SpO2   04/19/22 2000 -- -- -- 58 19 97 %   04/19/22 1945 (!)  138/109 -- -- 61 -- 97 %   04/19/22 1822 (!) 201/84 98.1  F (36.7  C) Temporal 61 18 99 %       Physical Exam      Eyes:    Conjunctiva normal  Neck:    Supple, no meningismus.     CV:     Regular rate and rhythm.      No murmurs, rubs or gallops.       No unilateral leg swelling.       2+ radial pulses bilateral.       No lower extremity edema.  PULM:    Clear to auscultation bilateral.       No respiratory distress.      Good air exchange.     No rales or wheezing.     Patient has focal moderate tenderness to right parasternal area adjacent to third and fourth ribs which reproduces her pain  ABD:    Soft, non-tender, non-distended.       No pulsatile masses.       No rebound, guarding or rigidity.  MSK:     No gross deformity to all four extremities.   LYMPH:   No cervical lymphadenopathy.  NEURO:   Alert. Good muscle tone, no atrophy.  Skin:    Warm, dry and intact.    Psych:    Mood is good and affect is appropriate.        Emergency Department Course   ECG  ECG obtained at 18:18:03, ECG read at 1941  Sinus bradycardia, Otherwise normal ECG    No significant change as compared to prior, dated 12/15/21.  Rate 58 bpm. UT interval 162 ms. QRS duration 104 ms. QT/QTc 428/420 ms. P-R-T axes 42 15 56.     Laboratory:  Labs Ordered and Resulted from Time of ED Arrival to Time of ED Departure   BASIC METABOLIC PANEL - Abnormal       Result Value    Sodium 141      Potassium 3.7      Chloride 109      Carbon Dioxide (CO2) 26      Anion Gap 6      Urea Nitrogen 11      Creatinine 0.58      Calcium 8.8      Glucose 135 (*)     GFR Estimate >90     TROPONIN I - Normal    Troponin I High Sensitivity 4     CBC WITH PLATELETS AND DIFFERENTIAL    WBC Count 6.8      RBC Count 4.25      Hemoglobin 12.8      Hematocrit 39.8      MCV 94      MCH 30.1      MCHC 32.2      RDW 13.1      Platelet Count 362      % Neutrophils 40      % Lymphocytes 49      % Monocytes 7      % Eosinophils 2      % Basophils 1      % Immature Granulocytes  1      NRBCs per 100 WBC 0      Absolute Neutrophils 2.7      Absolute Lymphocytes 3.5      Absolute Monocytes 0.4      Absolute Eosinophils 0.1      Absolute Basophils 0.0      Absolute Immature Granulocytes 0.1      Absolute NRBCs 0.0        Emergency Department Course:     Reviewed:  I reviewed nursing notes, vitals and past medical history    Assessments:  : I obtained history and examined the patient as noted above.      Interventions:   Toradol 15 mg IV     Disposition:  The patient was discharged to home.     Impression & Plan       Medical Decision Makin-year-old female presents to the ED with atypical chest pain.  Symptoms been present for 24 hours.  EKG is without ischemic changes.  Troponin within normal limits.  Given the duration of constant symptoms, no indication for serial enzymes.  She has no features concerning for aortic dissection, aortic aneurysm.  Very low suspicion for pulmonary embolism.  In accordance with patient's care plan, further advanced imaging exposes her to unnecessary radiation risk and outweighs the benefit given reassuring examination.  Given her significant focal tenderness to the chest wall, this may represent costochondritis or intercostal muscle strain.  Tylenol as needed for pain.  Close follow-up primary care physician.     Diagnosis:    ICD-10-CM    1. Chest pain, unspecified type  R07.9    2. Elevated blood pressure reading without diagnosis of hypertension  R03.0        Discharge Medications:  New Prescriptions    DICLOFENAC (VOLTAREN) 1 % TOPICAL GEL    Apply 2 g topically 4 times daily as needed for moderate pain       Scribe Disclosure:  I, Yordan Queen, am serving as a scribe at 7:36 PM on 2022 to document services personally performed by French Harmon MD based on my observations and the provider's statements to me.           French Harmon MD  22 1600

## 2022-05-17 ENCOUNTER — HOSPITAL ENCOUNTER (EMERGENCY)
Facility: CLINIC | Age: 47
Discharge: HOME | End: 2022-05-17
Attending: EMERGENCY MEDICINE | Admitting: EMERGENCY MEDICINE

## 2022-05-17 VITALS
BODY MASS INDEX: 50.38 KG/M2 | OXYGEN SATURATION: 100 % | HEART RATE: 56 BPM | SYSTOLIC BLOOD PRESSURE: 168 MMHG | RESPIRATION RATE: 20 BRPM | DIASTOLIC BLOOD PRESSURE: 87 MMHG | TEMPERATURE: 98 F | HEIGHT: 60 IN

## 2022-05-17 DIAGNOSIS — R10.12 LEFT UPPER QUADRANT PAIN: ICD-10-CM

## 2022-05-17 LAB
ALBUMIN SERPL-MCNC: 3.7 G/DL (ref 3.4–5)
ALBUMIN UR-MCNC: NEGATIVE MG/DL
ALP SERPL-CCNC: 96 U/L (ref 40–150)
ALT SERPL W P-5'-P-CCNC: 44 U/L (ref 0–50)
ANION GAP SERPL CALCULATED.3IONS-SCNC: 3 MMOL/L (ref 3–14)
APPEARANCE UR: CLEAR
AST SERPL W P-5'-P-CCNC: 26 U/L (ref 0–45)
BILIRUB SERPL-MCNC: 0.5 MG/DL (ref 0.2–1.3)
BILIRUB UR QL STRIP: NEGATIVE
BUN SERPL-MCNC: 9 MG/DL (ref 7–30)
CALCIUM SERPL-MCNC: 8.8 MG/DL (ref 8.5–10.1)
CHLORIDE BLD-SCNC: 107 MMOL/L (ref 94–109)
CO2 SERPL-SCNC: 30 MMOL/L (ref 20–32)
COLOR UR AUTO: YELLOW
CREAT SERPL-MCNC: 0.5 MG/DL (ref 0.52–1.04)
ERYTHROCYTE [DISTWIDTH] IN BLOOD BY AUTOMATED COUNT: 13.1 % (ref 10–15)
GFR SERPL CREATININE-BSD FRML MDRD: >90 ML/MIN/1.73M2
GLUCOSE BLD-MCNC: 126 MG/DL (ref 70–99)
GLUCOSE UR STRIP-MCNC: 30 MG/DL
HCT VFR BLD AUTO: 40 % (ref 35–47)
HGB BLD-MCNC: 12.8 G/DL (ref 11.7–15.7)
HGB UR QL STRIP: NEGATIVE
HOLD SPECIMEN: NORMAL
HOLD SPECIMEN: NORMAL
KETONES UR STRIP-MCNC: NEGATIVE MG/DL
LEUKOCYTE ESTERASE UR QL STRIP: NEGATIVE
LIPASE SERPL-CCNC: 63 U/L (ref 73–393)
MCH RBC QN AUTO: 29.6 PG (ref 26.5–33)
MCHC RBC AUTO-ENTMCNC: 32 G/DL (ref 31.5–36.5)
MCV RBC AUTO: 93 FL (ref 78–100)
MUCOUS THREADS #/AREA URNS LPF: PRESENT /LPF
NITRATE UR QL: NEGATIVE
PH UR STRIP: 5.5 [PH] (ref 5–7)
PLATELET # BLD AUTO: 357 10E3/UL (ref 150–450)
POTASSIUM BLD-SCNC: 3.6 MMOL/L (ref 3.4–5.3)
PROT SERPL-MCNC: 7.5 G/DL (ref 6.8–8.8)
RBC # BLD AUTO: 4.32 10E6/UL (ref 3.8–5.2)
RBC URINE: 1 /HPF
SODIUM SERPL-SCNC: 140 MMOL/L (ref 133–144)
SP GR UR STRIP: 1.02 (ref 1–1.03)
SQUAMOUS EPITHELIAL: 3 /HPF
UROBILINOGEN UR STRIP-MCNC: NORMAL MG/DL
WBC # BLD AUTO: 6.1 10E3/UL (ref 4–11)
WBC URINE: 1 /HPF

## 2022-05-17 PROCEDURE — 250N000011 HC RX IP 250 OP 636: Performed by: EMERGENCY MEDICINE

## 2022-05-17 PROCEDURE — 36415 COLL VENOUS BLD VENIPUNCTURE: CPT | Performed by: EMERGENCY MEDICINE

## 2022-05-17 PROCEDURE — 81001 URINALYSIS AUTO W/SCOPE: CPT | Performed by: EMERGENCY MEDICINE

## 2022-05-17 PROCEDURE — 85027 COMPLETE CBC AUTOMATED: CPT | Performed by: EMERGENCY MEDICINE

## 2022-05-17 PROCEDURE — 83690 ASSAY OF LIPASE: CPT | Performed by: EMERGENCY MEDICINE

## 2022-05-17 PROCEDURE — 96374 THER/PROPH/DIAG INJ IV PUSH: CPT

## 2022-05-17 PROCEDURE — 258N000003 HC RX IP 258 OP 636: Performed by: EMERGENCY MEDICINE

## 2022-05-17 PROCEDURE — 80053 COMPREHEN METABOLIC PANEL: CPT | Performed by: EMERGENCY MEDICINE

## 2022-05-17 PROCEDURE — 85014 HEMATOCRIT: CPT | Performed by: EMERGENCY MEDICINE

## 2022-05-17 PROCEDURE — 93005 ELECTROCARDIOGRAM TRACING: CPT

## 2022-05-17 PROCEDURE — 250N000013 HC RX MED GY IP 250 OP 250 PS 637: Performed by: EMERGENCY MEDICINE

## 2022-05-17 PROCEDURE — 99284 EMERGENCY DEPT VISIT MOD MDM: CPT | Mod: 25

## 2022-05-17 PROCEDURE — 96361 HYDRATE IV INFUSION ADD-ON: CPT

## 2022-05-17 RX ORDER — OLANZAPINE 5 MG/1
5 TABLET, ORALLY DISINTEGRATING ORAL ONCE
Status: COMPLETED | OUTPATIENT
Start: 2022-05-17 | End: 2022-05-17

## 2022-05-17 RX ORDER — OXYCODONE HYDROCHLORIDE 5 MG/1
5 TABLET ORAL ONCE
Status: COMPLETED | OUTPATIENT
Start: 2022-05-17 | End: 2022-05-17

## 2022-05-17 RX ORDER — OXYCODONE HYDROCHLORIDE 5 MG/1
5 TABLET ORAL
Status: COMPLETED | OUTPATIENT
Start: 2022-05-17 | End: 2022-05-17

## 2022-05-17 RX ORDER — MAGNESIUM HYDROXIDE/ALUMINUM HYDROXICE/SIMETHICONE 120; 1200; 1200 MG/30ML; MG/30ML; MG/30ML
30 SUSPENSION ORAL ONCE
Status: COMPLETED | OUTPATIENT
Start: 2022-05-17 | End: 2022-05-17

## 2022-05-17 RX ORDER — ONDANSETRON 4 MG/1
4 TABLET, ORALLY DISINTEGRATING ORAL EVERY 8 HOURS PRN
Qty: 10 TABLET | Refills: 0 | Status: SHIPPED | OUTPATIENT
Start: 2022-05-17 | End: 2022-05-20

## 2022-05-17 RX ORDER — ONDANSETRON 2 MG/ML
4 INJECTION INTRAMUSCULAR; INTRAVENOUS ONCE
Status: COMPLETED | OUTPATIENT
Start: 2022-05-17 | End: 2022-05-17

## 2022-05-17 RX ORDER — FAMOTIDINE 20 MG/1
20 TABLET, FILM COATED ORAL ONCE
Status: COMPLETED | OUTPATIENT
Start: 2022-05-17 | End: 2022-05-17

## 2022-05-17 RX ADMIN — ONDANSETRON 4 MG: 2 INJECTION INTRAMUSCULAR; INTRAVENOUS at 17:37

## 2022-05-17 RX ADMIN — OXYCODONE HYDROCHLORIDE 5 MG: 5 TABLET ORAL at 19:30

## 2022-05-17 RX ADMIN — FAMOTIDINE 20 MG: 20 TABLET, FILM COATED ORAL at 18:55

## 2022-05-17 RX ADMIN — SODIUM CHLORIDE 1000 ML: 9 INJECTION, SOLUTION INTRAVENOUS at 17:37

## 2022-05-17 RX ADMIN — OXYCODONE HYDROCHLORIDE 5 MG: 5 TABLET ORAL at 17:38

## 2022-05-17 RX ADMIN — ALUMINUM HYDROXIDE, MAGNESIUM HYDROXIDE, AND SIMETHICONE 30 ML: 200; 200; 20 SUSPENSION ORAL at 17:37

## 2022-05-17 RX ADMIN — OLANZAPINE 5 MG: 5 TABLET, ORALLY DISINTEGRATING ORAL at 18:55

## 2022-05-17 ASSESSMENT — ENCOUNTER SYMPTOMS
NAUSEA: 1
DIARRHEA: 1
VOMITING: 0
SHORTNESS OF BREATH: 0
ABDOMINAL PAIN: 1
BLOOD IN STOOL: 0
DYSURIA: 0
FEVER: 0

## 2022-05-17 NOTE — ED PROVIDER NOTES
History   Chief Complaint:  Abdominal Pain       HPI   Adriane Garrett is a 46 year old female with history of pancreatitis, hypertension, irritable bowel syndrome, GERD who presents with left-sided abdominal pain, diarrhea and nausea starting last night.  She notes chronic abdominal pain but this is worse. This is exacerbated by eating and laying on her back and alleviated by lying on her left or when leaning forward while sitting. She notes of 4 episodes of diarrhea since last night. Denies bloody stools. History of hysterectomy and cholecystectomy. She notes of being seen here before for left-sided abdominal pain and states it was likely due to pancreatitis. No fall or injury. Denies chance of pregnancy. No antibiotic use in the last month. No known sick contacts or recent travel. Denies dysuria, pain with urination, vomiting, shortness of breath or fever.    Review of Systems   Constitutional: Negative for fever.   Respiratory: Negative for shortness of breath.    Gastrointestinal: Positive for abdominal pain, diarrhea and nausea. Negative for blood in stool and vomiting.   Genitourinary: Negative for dysuria.   All other systems reviewed and are negative.      Allergies:  Ampicillin  Aspirin  Chantix [Varenicline]  Ciprofloxacin  Penicillins  Tessalon [Benzonatate]    Medications:  Albuterol inhaler  Prilosec    Past Medical History:     IBS  Pancreatitis  Asthma  GERD  Tobacco use disorder  Hyperlipidemia  Vitamin D deficiency  Nonalcoholic fatty liver disease  Migraine headache  Hypertension  COPD    Past Surgical History:    Foot surgery, right  Cholecystectomy  Hysterectomy total abdominal  Myringotomy   ECD    Family History:    Mother - diabetes, type II  Sister - diabetes  Child - diabetes  Brother - diabetes, heart disease, coronary stenting  Father - aneurysm     Social History:  The patient presents to the ED alone.    Physical Exam     Patient Vitals for the past 24 hrs:   BP Temp Temp src Pulse  Resp SpO2 Height   05/17/22 1845 (!) 168/87 -- -- -- -- 100 % --   05/17/22 1720 132/70 98  F (36.7  C) Oral 56 20 97 % 1.524 m (5')   05/17/22 1509 -- 98  F (36.7  C) Temporal -- -- -- --   05/17/22 1508 (!) 158/106 -- -- 64 20 95 % --       Physical Exam   Gen: alert  HEENT: PERRL, oropharynx clear  Neck: normal ROM  CV: RRR, no murmurs  Pulm: breath sounds equal, lungs clear  Abd: Soft, mild LUQ tenderness  Back: no evidence of injury, no cva tenderness  MSK: no deformity, moves all extremities  Skin: no rash  Neuro: alert, appropriate conversation and interaction      Emergency Department Course   ECG  ECG results from 05/17/22   EKG 12 lead     Value    Systolic Blood Pressure     Diastolic Blood Pressure     Ventricular Rate 58    Atrial Rate 58    AZ Interval 166    QRS Duration 100        QTc 435    P Axis 20    R AXIS -6    T Axis 28    Interpretation ECG      Sinus bradycardia  Moderate voltage criteria for LVH, may be normal variant  Borderline ECG  When compared with ECG of 19-APR-2022 18:18,  No significant change was found         Laboratory:  Labs Ordered and Resulted from Time of ED Arrival to Time of ED Departure   COMPREHENSIVE METABOLIC PANEL - Abnormal       Result Value    Sodium 140      Potassium 3.6      Chloride 107      Carbon Dioxide (CO2) 30      Anion Gap 3      Urea Nitrogen 9      Creatinine 0.50 (*)     Calcium 8.8      Glucose 126 (*)     Alkaline Phosphatase 96      AST 26      ALT 44      Protein Total 7.5      Albumin 3.7      Bilirubin Total 0.5      GFR Estimate >90     LIPASE - Abnormal    Lipase 63 (*)    ROUTINE UA WITH MICROSCOPIC - Abnormal    Color Urine Yellow      Appearance Urine Clear      Glucose Urine 30  (*)     Bilirubin Urine Negative      Ketones Urine Negative      Specific Gravity Urine 1.024      Blood Urine Negative      pH Urine 5.5      Protein Albumin Urine Negative      Urobilinogen Urine Normal      Nitrite Urine Negative      Leukocyte Esterase  Urine Negative      Mucus Urine Present (*)     RBC Urine 1      WBC Urine 1      Squamous Epithelials Urine 3 (*)    CBC WITH PLATELETS - Normal    WBC Count 6.1      RBC Count 4.32      Hemoglobin 12.8      Hematocrit 40.0      MCV 93      MCH 29.6      MCHC 32.0      RDW 13.1      Platelet Count 357              Emergency Department Course:       Reviewed:  I reviewed nursing notes, vitals, past medical history and Care Everywhere    Assessments:  1712 I obtained history and examined the patient as noted above.     1924 I rechecked the patient and explained findings.     Interventions:  1737 NS bolus 1L IV  1737 Zofran 4 mg IV  1737 Roxicodone 5 mg PO  1738 Maalox 30 mL PO  1855 Zyprexa 5 mg PO  1855 Pepcid 20 mg PO  1930 Roxicodone 5 mg PO    Disposition:  The patient was discharged to home.     Impression & Plan     CMS Diagnoses: None      Medical Decision Making:  Patient presents for left upper quadrant abdominal pain.  ED care plan reviewed.  Prior CT scans reviewed.  In accordance with ED care plan, laboratory studies were checked and found to be unremarkable.  Oral oxycodone was given however no IV opioids were used.  My exam, patient does not have peritonitis.  Bowel sounds are active.  Based on clinical exam, normal laboratory studies and history of repeated negative CT scans for similar presentations, I did not feel that advanced imaging was warranted today.  Rather, we will focus on symptomatic care.  Patient was able to tolerate liquids here.  Fluids are completed.  UA was bland.  No chest pain and EKG was nonischemic.  I did not feel this likely represented a cardiac equivalent.  Rather, I feel this is likely an exacerbation of chronic abdominal pain.  Recommended clear liquids x24 to 48 hours follow-up with PCP or GI.  Patient states she has not seen GI recently and therefore GI referral placed.  Signs and symptoms for which return to the emergency department discussed discharge  home.    Diagnosis:    ICD-10-CM    1. Left upper quadrant pain  R10.12 Adult Gastro Ref - Consult Only       Discharge Medications:  New Prescriptions    OMEPRAZOLE (PRILOSEC) 20 MG DR CAPSULE    Take 1 capsule (20 mg) by mouth 2 times daily for 14 days    ONDANSETRON (ZOFRAN ODT) 4 MG ODT TAB    Take 1 tablet (4 mg) by mouth every 8 hours as needed for nausea or vomiting       Scribe Disclosure:  I, Erna Siu, am serving as a scribe at 5:09 PM on 5/17/2022 to document services personally performed by Shasta Dong MD based on my observations and the provider's statements to me.              Shasta Dong MD  05/17/22 1943

## 2022-05-17 NOTE — ED TRIAGE NOTES
Patient c/o upper left abdominal pain that started around 2200 last evening. Feeling nauseas but denies vomiting. Endorses diarrhea. Denies alcohol use. States she has had this pain before and diagnosed as acute pancreatitis. ABCs intact

## 2022-05-18 LAB
ATRIAL RATE - MUSE: 58 BPM
DIASTOLIC BLOOD PRESSURE - MUSE: NORMAL MMHG
INTERPRETATION ECG - MUSE: NORMAL
P AXIS - MUSE: 20 DEGREES
PR INTERVAL - MUSE: 166 MS
QRS DURATION - MUSE: 100 MS
QT - MUSE: 444 MS
QTC - MUSE: 435 MS
R AXIS - MUSE: -6 DEGREES
SYSTOLIC BLOOD PRESSURE - MUSE: NORMAL MMHG
T AXIS - MUSE: 28 DEGREES
VENTRICULAR RATE- MUSE: 58 BPM

## 2022-05-18 NOTE — DISCHARGE INSTRUCTIONS
Clear liquids for 24-48 hours.  Use zofran as needed as directed.  Start acid lowering medication- omeprazole 20 mg 2x per day for 2 weeks.  Follow up with GI- referral sent.  See Primary care doctor later this week.      Discharge Instructions  Abdominal Pain    Abdominal pain (belly pain) can be caused by many things. Your evaluation today does not show the exact cause for your pain. Your provider today has decided that it is unlikely your pain is due to a life threatening problem, or a problem requiring surgery or hospital admission. Sometimes those problems cannot be found right away, so it is very important that you follow up as directed.  Sometimes only the changes which occur over time allow the cause of your pain to be found.    Generally, every Emergency Department visit should have a follow-up clinic visit with either a primary or a specialty clinic/provider. Please follow-up as instructed by your emergency provider today. With abdominal pain, we often recommend very close follow-up, such as the following day.    ADULTS:  Return to the Emergency Department right away if:    You get an oral temperature above 102oF or as directed by your provider.  You have blood in your stools. This may be bright red or appear as black, tarry stools.    You keep vomiting (throwing up) or cannot drink liquids.  You see blood when you vomit.   You cannot have a bowel movement or you cannot pass gas.  Your stomach gets bloated or bigger.  Your skin or the whites of your eyes look yellow.  You faint.  You have bloody, frequent or painful urination (peeing).  You have new symptoms or anything that worries you.    CHILDREN:  Return to the Emergency Department right away if your child has any of the above-listed symptoms or the following:    Pushes your hand away or screams/cries when his/her belly is touched.  You notice your child is very fussy or weak.  Your child is very tired and is too tired to eat or drink.  Your child is  dehydrated.  Signs of dehydration can be:  Significant change in the amount of wet diapers/urine.  Your infant or child starts to have dry mouth and lips, or no saliva (spit) or tears.    PREGNANT WOMEN:  Return to the Emergency Department right away if you have any of the above-listed symptoms or the following:    You have bleeding, leaking fluid or passing tissue from the vagina.  You have worse pain or cramping, or pain in your shoulder or back.  You have vomiting that will not stop.  You have a temperature of 100oF or more.  Your baby is not moving as much as usual.  You faint.  You get a bad headache with or without eye problems and abdominal pain.  You have a seizure.  You have unusual discharge from your vagina and abdominal pain.    Abdominal pain is pretty common during pregnancy.  Your pain may or may not be related to your pregnancy. You should follow-up closely with your OB provider so they can evaluate you and your baby.  Until you follow-up with your regular provider, do the following:     Avoid sex and do not put anything in your vagina.  Drink clear fluids.  Only take medications approved by your provider.    MORE INFORMATION:    Appendicitis:  A possible cause of abdominal pain in any person who still has their appendix is acute appendicitis. Appendicitis is often hard to diagnose.  Testing does not always rule out early appendicitis or other causes of abdominal pain. Close follow-up with your provider and re-evaluations may be needed to figure out the reason for your abdominal pain.    Follow-up:  It is very important that you make an appointment with your clinic and go to the appointment.  If you do not follow-up with your primary provider, it may result in missing an important development which could result in permanent injury or disability and/or lasting pain.  If there is any problem keeping your appointment, call your provider or return to the Emergency Department.    Medications:  Take your  "medications as directed by your provider today.  Before using over-the-counter medications, ask your provider and make sure to take the medications as directed.  If you have any questions about medications, ask your provider.    Diet:  Resume your normal diet as much as possible, but do not eat fried, fatty or spicy foods while you have pain.  Do not drink alcohol or have caffeine.  Do not smoke tobacco.    Probiotics: If you have been given an antibiotic, you may want to also take a probiotic pill or eat yogurt with live cultures. Probiotics have \"good bacteria\" to help your intestines stay healthy. Studies have shown that probiotics help prevent diarrhea (loose stools) and other intestine problems (including C. diff infection) when you take antibiotics. You can buy these without a prescription in the pharmacy section of the store.     If you were given a prescription for medicine here today, be sure to read all of the information (including the package insert) that comes with your prescription.  This will include important information about the medicine, its side effects, and any warnings that you need to know about.  The pharmacist who fills the prescription can provide more information and answer questions you may have about the medicine.  If you have questions or concerns that the pharmacist cannot address, please call or return to the Emergency Department.       Remember that you can always come back to the Emergency Department if you are not able to see your regular provider in the amount of time listed above, if you get any new symptoms, or if there is anything that worries you.      "

## 2022-05-23 ENCOUNTER — HOSPITAL ENCOUNTER (EMERGENCY)
Facility: CLINIC | Age: 47
Discharge: HOME | End: 2022-05-23
Attending: EMERGENCY MEDICINE | Admitting: EMERGENCY MEDICINE

## 2022-05-23 VITALS
OXYGEN SATURATION: 96 % | HEART RATE: 62 BPM | DIASTOLIC BLOOD PRESSURE: 88 MMHG | RESPIRATION RATE: 18 BRPM | SYSTOLIC BLOOD PRESSURE: 180 MMHG | TEMPERATURE: 97.2 F

## 2022-05-23 DIAGNOSIS — B37.2 CANDIDIASIS OF SKIN: ICD-10-CM

## 2022-05-23 PROCEDURE — 99283 EMERGENCY DEPT VISIT LOW MDM: CPT

## 2022-05-23 RX ORDER — NYSTATIN 100000 U/G
OINTMENT TOPICAL 2 TIMES DAILY
Qty: 15 G | Refills: 0 | Status: SHIPPED | OUTPATIENT
Start: 2022-05-23 | End: 2022-06-06

## 2022-05-23 ASSESSMENT — ENCOUNTER SYMPTOMS
FEVER: 0
VOMITING: 0
CHILLS: 0
NAUSEA: 0

## 2022-05-23 NOTE — ED TRIAGE NOTES
Pt reports noticing a rash on the right side of groin last night that is burning and itching. PT VSS and ABC's intact

## 2022-05-23 NOTE — ED PROVIDER NOTES
History   Chief Complaint:  Rash    The history is provided by the patient.      Adriane Garrett is a 46 year old female who presents with rash. Last night she noticed a rash to the right side of her groin that stings and itches. She denies fever, chills, nausea or vomiting and otherwise feels okay. She had chicken pox when she was younger.     Review of Systems   Constitutional: Negative for chills and fever.   Gastrointestinal: Negative for nausea and vomiting.   Skin: Positive for rash.   All other systems reviewed and are negative.    Allergies:  Ampicillin  Aspirin  Chantix [Varenicline]  Ciprofloxacin  Penicillins  Tessalon [Benzonatate]     Medications:  Albuterol inhaler  Prilosec     Past Medical History:     IBS  Pancreatitis  Asthma  GERD  Tobacco use disorder  Hyperlipidemia  Vitamin D deficiency  Nonalcoholic fatty liver disease  Migraine headache  Hypertension  COPD     Past Surgical History:    Foot surgery, right  Cholecystectomy  Hysterectomy total abdominal  Myringotomy   ECD     Family History:    Mother - diabetes, type II  Sister - diabetes  Child - diabetes  Brother - diabetes, heart disease, coronary stenting  Father - aneurysm       Social History:  Presents to the ED alone     Physical Exam     Patient Vitals for the past 24 hrs:   BP Temp Temp src Pulse Resp SpO2   05/23/22 1306 (!) 190/86 97.2  F (36.2  C) Temporal 54 18 96 %       Physical Exam  General: Patient is alert, awake and interactive when I enter the room  Head: The scalp, face, and head appear normal  Eyes: Conjunctivae are normal  ENT: The nose is normal, Pinnae are normal, External acoustic canals are normal  Neck: Trachea midline  CV: Pulses are normal.   Resp: No respiratory distress   Musc: Normal muscular tone, moving all extremities.  Skin: Tender, erythematous lesion along the skin fold of the right groin with evidence of numerous satellite lesions consistent with candidal infection  Neuro: Speech is normal and fluent.  Face is symmetric.   Psych: Normal affect.  Appropriate interactions.    Emergency Department Course     Emergency Department Course:    Reviewed:  I reviewed nursing notes, vitals and past medical history    Assessments:  1655 I obtained history and examined the patient as noted above.     Disposition:  The patient was discharged to home.     Impression & Plan     Medical Decision Making:  Patient is a 46-year-old obese woman who presents emergency department with rash to her right groin.  This is consistent with a candidal rash and will be treated with nystatin.  Can follow-up with her primary care physician and return the emergency department for worsening symptoms.     Diagnosis:    ICD-10-CM    1. Candidiasis of skin  B37.2        Discharge Medications:  New Prescriptions    NYSTATIN (MYCOSTATIN) [id number] UNIT/GM EXTERNAL OINTMENT    Apply topically 2 times daily for 14 days       Scribe Disclosure:  I, Ki Penn, am serving as a scribe at 4:55 PM on 5/23/2022 to document services personally performed by Liang Lawson MD based on my observations and the provider's statements to me.            Liang Lawson MD  05/23/22 [id number]

## 2022-06-02 ENCOUNTER — HOSPITAL ENCOUNTER (EMERGENCY)
Facility: CLINIC | Age: 47
Discharge: HOME | End: 2022-06-02
Attending: EMERGENCY MEDICINE | Admitting: EMERGENCY MEDICINE

## 2022-06-02 ENCOUNTER — APPOINTMENT (OUTPATIENT)
Dept: GENERAL RADIOLOGY | Facility: CLINIC | Age: 47
End: 2022-06-02
Attending: EMERGENCY MEDICINE

## 2022-06-02 VITALS
RESPIRATION RATE: 16 BRPM | DIASTOLIC BLOOD PRESSURE: 81 MMHG | TEMPERATURE: 98.1 F | SYSTOLIC BLOOD PRESSURE: 137 MMHG | HEART RATE: 67 BPM

## 2022-06-02 DIAGNOSIS — S93.602A FOOT SPRAIN, LEFT, INITIAL ENCOUNTER: ICD-10-CM

## 2022-06-02 PROCEDURE — 99283 EMERGENCY DEPT VISIT LOW MDM: CPT

## 2022-06-02 PROCEDURE — 250N000013 HC RX MED GY IP 250 OP 250 PS 637: Performed by: EMERGENCY MEDICINE

## 2022-06-02 PROCEDURE — 73630 X-RAY EXAM OF FOOT: CPT | Mod: LT

## 2022-06-02 RX ORDER — ACETAMINOPHEN 500 MG
1000 TABLET ORAL ONCE
Status: COMPLETED | OUTPATIENT
Start: 2022-06-02 | End: 2022-06-02

## 2022-06-02 RX ORDER — CYCLOBENZAPRINE HCL 10 MG
10 TABLET ORAL 3 TIMES DAILY PRN
Qty: 20 TABLET | Refills: 0 | Status: SHIPPED | OUTPATIENT
Start: 2022-06-02 | End: 2022-06-08

## 2022-06-02 RX ADMIN — ACETAMINOPHEN 1000 MG: 500 TABLET, FILM COATED ORAL at 19:57

## 2022-06-02 ASSESSMENT — ENCOUNTER SYMPTOMS
NECK PAIN: 0
MYALGIAS: 1

## 2022-06-03 NOTE — DISCHARGE INSTRUCTIONS
Return to the ER for worsening pain, discoloration or loss of sensation in the toes, or any new concerns.    Follow-up with your primary care doctor in 4 to 5 days.

## 2022-06-03 NOTE — ED TRIAGE NOTES
Sprained left ankle around 0900. Now having pain in top of left foot. Intermittent tingling. CMS intact.

## 2022-06-03 NOTE — ED PROVIDER NOTES
History   Chief Complaint:  Foot Pain       The history is provided by the patient.      Adriane Garrett is a 46 year old female who presents with left foot pain. She twisted her left ankle earlier today, but did not think much of it at the time. She did not fall to the ground and hit her head. Then later in the day, she developed pain over the dorsum of foot. She has difficulty walking due to the pain. She took a dose of ibuprofen for pain management. She denies neck pain.     Review of Systems   Musculoskeletal: Positive for myalgias (L foot). Negative for neck pain.   All other systems reviewed and are negative.    Allergies:  Ampicillin  Aspirin  Chantix [Varenicline]  Ciprofloxacin  Penicillins  Tessalon [Benzonatate]    Medications:  Flexeril   Albuterol     Past Medical History:     IBS  Darier disease   Bipolar 1 disorder   Morbid obesity   Hypovitaminosis D   HLD   Nonalcoholic fatty liver disorder   Migraine   TMJ pain   HTN   Chronic abdominal pain   Sinus bradycardia   Neuralgia of abdomen    Tongue irritation    Oral candidiasis    Endometriosis  Dysplastic Nevus  Sialoadenitis  Sebaceous cyst  Hypomagnesemia  Acute Gingivitis  Skin Abscess Of The Left Thigh  Furuncle  Moderate Recurrent Major Depression  Herpes Zoster (Shingles)  Asthma     Past Surgical History:    R subtalar arthrodesis   Cholecystectomy   Hysterectomy   Tubal ligation   Cholecystectomy   Total abdominal hysterectomy w/ bilateral salpingo-oophorectomy  Bilateral myringotomy w/ tube placement   EGD w/ biopsy       Family History:    Mother - DM   Sister - DM, asthma   Brother - heart disease   Child - DM, asthma   Father - aneurysm     Social History:  Patient presents to the ED alone via private vehicle   Hx of alcohol use and tobacco use     Physical Exam     Patient Vitals for the past 24 hrs:   BP Temp Temp src Pulse Resp   06/02/22 1954 137/81 98.1  F (36.7  C) Temporal 67 16       Physical Exam  Constitutional:       General:  She is not in acute distress.     Appearance: She is not diaphoretic.   HENT:      Head: Atraumatic.      Mouth/Throat:      Pharynx: No oropharyngeal exudate.   Eyes:      General: No scleral icterus.     Pupils: Pupils are equal, round, and reactive to light.   Cardiovascular:      Heart sounds: Normal heart sounds.   Pulmonary:      Effort: No respiratory distress.      Breath sounds: Normal breath sounds.   Abdominal:      General: Bowel sounds are normal.      Palpations: Abdomen is soft.      Tenderness: There is no abdominal tenderness.   Musculoskeletal:         General: No tenderness.   Skin:     General: Skin is warm.      Findings: No rash.       Emergency Department Course   Imaging:  Foot XR, G/E 3 views, left  Final Result  IMPRESSION: Normal joint spaces and alignment. No fracture. Small plantar and Achilles heel spurs.    Report per radiology    Emergency Department Course:    Reviewed:  I reviewed nursing notes, vitals and past medical history    Assessments:  2320 I obtained history and examined the patient as noted above. I explained findings and discussed plan for discharge home.    Interventions:  1957 Tylenol 1g PO    Disposition:  The patient was discharged to home.     Impression & Plan   Medical Decision Making:  This patient is a 46-year-old woman who says she twisted her foot and ankle earlier today.  There is no tenderness to the ankle.  She does have some tenderness over the dorsum of the midfoot.  X-ray does not show any fracture or malalignment.  No tenderness of the knee.  She requested an Ace wrap which was provided and she remains distal neurovascular intact.  She will have Flexeril for pain.  She is to follow through primary clinic and return if worse.  She was offered crutches but says she has some at home and will use them for assistance walking.    Diagnosis:    ICD-10-CM    1. Foot sprain, left, initial encounter  S93.602A        Discharge Medications:  Discharge Medication  List as of 6/2/2022 11:37 PM      START taking these medications    Details   cyclobenzaprine (FLEXERIL) 10 MG tablet Take 1 tablet (10 mg) by mouth 3 times daily as needed for muscle spasms, Disp-20 tablet, R-0, E-Prescribe             Scribe Disclosure:  I, Huang Antonio, am serving as a scribe at 11:33 PM on 6/2/2022 to document services personally performed by Luciano Temple MD based on my observations and the provider's statements to me.        Luciano Temple MD  06/03/22 0215

## 2022-07-15 ENCOUNTER — APPOINTMENT (OUTPATIENT)
Dept: CT IMAGING | Facility: CLINIC | Age: 47
End: 2022-07-15
Attending: EMERGENCY MEDICINE

## 2022-07-15 ENCOUNTER — HOSPITAL ENCOUNTER (EMERGENCY)
Facility: CLINIC | Age: 47
Discharge: HOME | End: 2022-07-15
Attending: EMERGENCY MEDICINE | Admitting: EMERGENCY MEDICINE

## 2022-07-15 VITALS
DIASTOLIC BLOOD PRESSURE: 86 MMHG | OXYGEN SATURATION: 98 % | SYSTOLIC BLOOD PRESSURE: 169 MMHG | RESPIRATION RATE: 16 BRPM | TEMPERATURE: 98 F | HEART RATE: 42 BPM

## 2022-07-15 DIAGNOSIS — R10.31 RLQ ABDOMINAL PAIN: ICD-10-CM

## 2022-07-15 LAB
ALBUMIN UR-MCNC: 10 MG/DL
ANION GAP SERPL CALCULATED.3IONS-SCNC: 7 MMOL/L (ref 3–14)
APPEARANCE UR: CLEAR
BACTERIA #/AREA URNS HPF: ABNORMAL /HPF
BASOPHILS # BLD AUTO: 0 10E3/UL (ref 0–0.2)
BASOPHILS NFR BLD AUTO: 0 %
BILIRUB UR QL STRIP: NEGATIVE
BUN SERPL-MCNC: 8 MG/DL (ref 7–30)
CALCIUM SERPL-MCNC: 9.3 MG/DL (ref 8.5–10.1)
CHLORIDE BLD-SCNC: 108 MMOL/L (ref 94–109)
CO2 SERPL-SCNC: 26 MMOL/L (ref 20–32)
COLOR UR AUTO: YELLOW
CREAT SERPL-MCNC: 0.55 MG/DL (ref 0.52–1.04)
EOSINOPHIL # BLD AUTO: 0.1 10E3/UL (ref 0–0.7)
EOSINOPHIL NFR BLD AUTO: 2 %
ERYTHROCYTE [DISTWIDTH] IN BLOOD BY AUTOMATED COUNT: 13.1 % (ref 10–15)
GFR SERPL CREATININE-BSD FRML MDRD: >90 ML/MIN/1.73M2
GLUCOSE BLD-MCNC: 116 MG/DL (ref 70–99)
GLUCOSE UR STRIP-MCNC: NEGATIVE MG/DL
HCT VFR BLD AUTO: 42.9 % (ref 35–47)
HGB BLD-MCNC: 13.2 G/DL (ref 11.7–15.7)
HGB UR QL STRIP: ABNORMAL
HYALINE CASTS: 1 /LPF
IMM GRANULOCYTES # BLD: 0 10E3/UL
IMM GRANULOCYTES NFR BLD: 0 %
KETONES UR STRIP-MCNC: NEGATIVE MG/DL
LEUKOCYTE ESTERASE UR QL STRIP: NEGATIVE
LYMPHOCYTES # BLD AUTO: 2.6 10E3/UL (ref 0.8–5.3)
LYMPHOCYTES NFR BLD AUTO: 46 %
MCH RBC QN AUTO: 28.9 PG (ref 26.5–33)
MCHC RBC AUTO-ENTMCNC: 30.8 G/DL (ref 31.5–36.5)
MCV RBC AUTO: 94 FL (ref 78–100)
MONOCYTES # BLD AUTO: 0.4 10E3/UL (ref 0–1.3)
MONOCYTES NFR BLD AUTO: 7 %
MUCOUS THREADS #/AREA URNS LPF: PRESENT /LPF
NEUTROPHILS # BLD AUTO: 2.5 10E3/UL (ref 1.6–8.3)
NEUTROPHILS NFR BLD AUTO: 45 %
NITRATE UR QL: NEGATIVE
NRBC # BLD AUTO: 0 10E3/UL
NRBC BLD AUTO-RTO: 0 /100
PH UR STRIP: 5.5 [PH] (ref 5–7)
PLATELET # BLD AUTO: 394 10E3/UL (ref 150–450)
POTASSIUM BLD-SCNC: 3.7 MMOL/L (ref 3.4–5.3)
RBC # BLD AUTO: 4.56 10E6/UL (ref 3.8–5.2)
RBC URINE: 1 /HPF
SODIUM SERPL-SCNC: 141 MMOL/L (ref 133–144)
SP GR UR STRIP: 1.03 (ref 1–1.03)
SQUAMOUS EPITHELIAL: 1 /HPF
UROBILINOGEN UR STRIP-MCNC: NORMAL MG/DL
WBC # BLD AUTO: 5.6 10E3/UL (ref 4–11)
WBC URINE: 1 /HPF

## 2022-07-15 PROCEDURE — 74177 CT ABD & PELVIS W/CONTRAST: CPT

## 2022-07-15 PROCEDURE — 250N000011 HC RX IP 250 OP 636: Performed by: EMERGENCY MEDICINE

## 2022-07-15 PROCEDURE — 85025 COMPLETE CBC W/AUTO DIFF WBC: CPT | Performed by: EMERGENCY MEDICINE

## 2022-07-15 PROCEDURE — 85004 AUTOMATED DIFF WBC COUNT: CPT | Performed by: EMERGENCY MEDICINE

## 2022-07-15 PROCEDURE — 96374 THER/PROPH/DIAG INJ IV PUSH: CPT | Mod: 59

## 2022-07-15 PROCEDURE — 36415 COLL VENOUS BLD VENIPUNCTURE: CPT | Performed by: EMERGENCY MEDICINE

## 2022-07-15 PROCEDURE — 99285 EMERGENCY DEPT VISIT HI MDM: CPT | Mod: 25

## 2022-07-15 PROCEDURE — 250N000013 HC RX MED GY IP 250 OP 250 PS 637: Performed by: EMERGENCY MEDICINE

## 2022-07-15 PROCEDURE — 80048 BASIC METABOLIC PNL TOTAL CA: CPT | Performed by: EMERGENCY MEDICINE

## 2022-07-15 PROCEDURE — 81001 URINALYSIS AUTO W/SCOPE: CPT | Performed by: EMERGENCY MEDICINE

## 2022-07-15 PROCEDURE — 96375 TX/PRO/DX INJ NEW DRUG ADDON: CPT

## 2022-07-15 RX ORDER — DICYCLOMINE HCL 20 MG
20 TABLET ORAL 4 TIMES DAILY PRN
Qty: 20 TABLET | Refills: 0 | Status: SHIPPED | OUTPATIENT
Start: 2022-07-15 | End: 2022-07-25

## 2022-07-15 RX ORDER — KETOROLAC TROMETHAMINE 30 MG/ML
30 INJECTION, SOLUTION INTRAMUSCULAR; INTRAVENOUS ONCE
Status: COMPLETED | OUTPATIENT
Start: 2022-07-15 | End: 2022-07-15

## 2022-07-15 RX ORDER — DICYCLOMINE HCL 20 MG
20 TABLET ORAL ONCE
Status: COMPLETED | OUTPATIENT
Start: 2022-07-15 | End: 2022-07-15

## 2022-07-15 RX ORDER — ONDANSETRON 2 MG/ML
4 INJECTION INTRAMUSCULAR; INTRAVENOUS
Status: DISCONTINUED | OUTPATIENT
Start: 2022-07-15 | End: 2022-07-16 | Stop reason: HOSPADM

## 2022-07-15 RX ORDER — IOPAMIDOL 755 MG/ML
500 INJECTION, SOLUTION INTRAVASCULAR ONCE
Status: COMPLETED | OUTPATIENT
Start: 2022-07-15 | End: 2022-07-15

## 2022-07-15 RX ADMIN — DICYCLOMINE HYDROCHLORIDE 20 MG: 20 TABLET ORAL at 21:26

## 2022-07-15 RX ADMIN — KETOROLAC TROMETHAMINE 30 MG: 30 INJECTION, SOLUTION INTRAMUSCULAR at 20:32

## 2022-07-15 RX ADMIN — ONDANSETRON 4 MG: 2 INJECTION INTRAMUSCULAR; INTRAVENOUS at 20:32

## 2022-07-15 RX ADMIN — IOPAMIDOL 90 ML: 755 INJECTION, SOLUTION INTRAVENOUS at 20:49

## 2022-07-15 ASSESSMENT — ENCOUNTER SYMPTOMS
NAUSEA: 1
FEVER: 0
VOMITING: 0
DIARRHEA: 1
DYSURIA: 0
BLOOD IN STOOL: 0
FREQUENCY: 0
ABDOMINAL PAIN: 1

## 2022-07-15 NOTE — ED TRIAGE NOTES
"    Pt reports \"severe right lower abdominal pain\" since Wednesday at 4pm. Pt states that pain is constant and has been getting worse. Pt also having diarrhea and is nauseated. Pt still has appendix. Denies urinary sx. ABC intact.  "

## 2022-07-16 NOTE — ED PROVIDER NOTES
History   Chief Complaint:  No chief complaint on file.       MODESTO Garrett is a 46 year old female with history of hypertension and IBS who presents with sharp right lower abdominal pain since Wednesday that is exacerbated by moving, eating and drinking. She reports her pain is now radiating to her back. The last time she had pain like this was a few months ago, she states her pain is typically in her upper abdomen and she is typically able to drink water without pain. She has nausea but denies vomiting. She notes of diarrhea which is not new for her. She took tylenol at 1100 today. She does not take medications for IBS. Denies fever, blood in stool, dysuria, urgency or frequency. She has a care plan as she has a history of recurrent abdominal pain s/p cholecystectomy and hysterectomy. She has had multiple imaging without acute findings. History of chronic pain with multiple CTs in the past.    Review of Systems   Constitutional: Negative for fever.   Gastrointestinal: Positive for abdominal pain, diarrhea and nausea. Negative for blood in stool and vomiting.   Genitourinary: Negative for dysuria, frequency and urgency.   All other systems reviewed and are negative.      Allergies:  Ampicillin  Aspirin  Chantix [Varenicline]  Ciprofloxacin  Penicillins  Tessalon [Benzonatate]    Medications:  Albuterol inhaler    Past Medical History:     Vitamin D deficiency  Smoker  Pancreatitis   Chronic nonalcoholic liver disease  Morbid obesity  Migraine headache  IBS  Hypertension  Hyperlipidemia  GERD  Bipolar 1 disorder  Asthma, mild intermittent  Ovarian cyst    Past Surgical History:    Foot surgery, right  Cholecystectomy  Hysterectomy and tubal ligation  Myringotomy, bilateral  EGD with biopsy    Family History:    Mother - diabetes  Sister - diabetes  Child - diabetes  Brother - heart disease, coronary stenting  Father - aneurysm    Social History:  The patient presents to the ED alone.    Physical Exam      Patient Vitals for the past 24 hrs:   BP Temp Temp src Pulse Resp SpO2   07/15/22 2037 (!) 169/86 -- -- (!) 42 -- 98 %   07/15/22 1417 (!) 145/92 98  F (36.7  C) Temporal 62 16 99 %       Physical Exam  General: Alert, no acute distress  HEENT:  Moist mucous membranes.  Posterior oropharynx clear, no exudates.  Conjunctiva normal.   CV:  RRR, no m/r/g, skin warm and well perfused  Pulm:  CTAB, no wheezes/ronchi/rales.  No acute distress, breathing comfortably  GI:  Soft, RLQ tenderness, nondistended.  No rebound or guarding.  Positive Rovsing  MSK:  Moving all extremities.  No focal areas of edema, erythema, or tenderness  Skin:  WWP, no rashes, no lower extremity edema, skin color normal, no diaphoresis  Psych:  Well-appearing, normal affect, regular speech    Emergency Department Course     Imaging:  Abd/pelvis CT,  IV  contrast only TRAUMA / AAA   Final Result   IMPRESSION:       1.  No acute findings to explain symptoms.      2.  Hepatic steatosis. Regions of mildly irregular liver contour. Correlate with labs.                    Report per radiology    Laboratory:  Labs Ordered and Resulted from Time of ED Arrival to Time of ED Departure   BASIC METABOLIC PANEL - Abnormal       Result Value    Sodium 141      Potassium 3.7      Chloride 108      Carbon Dioxide (CO2) 26      Anion Gap 7      Urea Nitrogen 8      Creatinine 0.55      Calcium 9.3      Glucose 116 (*)     GFR Estimate >90     ROUTINE UA WITH MICROSCOPIC - Abnormal    Color Urine Yellow      Appearance Urine Clear      Glucose Urine Negative      Bilirubin Urine Negative      Ketones Urine Negative      Specific Gravity Urine 1.026      Blood Urine Trace (*)     pH Urine 5.5      Protein Albumin Urine 10  (*)     Urobilinogen Urine Normal      Nitrite Urine Negative      Leukocyte Esterase Urine Negative      Bacteria Urine Few (*)     Mucus Urine Present (*)     RBC Urine 1      WBC Urine 1      Squamous Epithelials Urine 1      Hyaline Casts  Urine 1     CBC WITH PLATELETS AND DIFFERENTIAL - Abnormal    WBC Count 5.6      RBC Count 4.56      Hemoglobin 13.2      Hematocrit 42.9      MCV 94      MCH 28.9      MCHC 30.8 (*)     RDW 13.1      Platelet Count 394      % Neutrophils 45      % Lymphocytes 46      % Monocytes 7      % Eosinophils 2      % Basophils 0      % Immature Granulocytes 0      NRBCs per 100 WBC 0      Absolute Neutrophils 2.5      Absolute Lymphocytes 2.6      Absolute Monocytes 0.4      Absolute Eosinophils 0.1      Absolute Basophils 0.0      Absolute Immature Granulocytes 0.0      Absolute NRBCs 0.0         Emergency Department Course:    Reviewed:  I reviewed nursing notes, vitals, past medical history and Care Everywhere    Assessments:   I obtained history and examined the patient as noted above.    I rechecked the patient and explained findings.     Interventions:   Zofran 4 mg IV   Toradol 30 mg IV   Bentyl 20 mg PO    Disposition:  The patient was discharged to home.     Impression & Plan     Medical Decision Makin-year-old female with history significant for cholecystectomy, hysterectomy, IBS presenting to the ER for evaluation of right lower quadrant abdominal pain.  She is afebrile and vitally stable.  She exhibits right lower quadrant abdominal tenderness with positive Rovsing.  Initial concern for appendicitis.  Other differential included UTI/cystitis, constipation, gastroenteritis/colitis, pelvic etiology such as ovarian cyst, ovarian cyst rupture, torsion amongst other things.  UA is bland and not concerning for UTI.  Her basic lab studies above are normal.  CT abdomen/pelvis shows no acute abnormality.  Noted hepatic steatosis on CT discussed with the patient at bedside.  No upper abdominal pain to warrant further imaging.  Unclear cause for patient's symptomatology today, but I do not find any life-threatening or sinister etiology at this time.  Patient understands the uncertainty of the  diagnosis and that certain pathologies can take time to declare itself.  Given reassuring work-up thus far, with reasonable clinical certainty I feel that the patient is safe to discharge home.  We will try Bentyl for her pain.  She is comfortable with this plan.  Discussed signs and symptoms that should prompt her urgent return to the ER.  All questions answered prior to patient discharge.    Diagnosis:    ICD-10-CM    1. RLQ abdominal pain  R10.31        Discharge Medications:  Discharge Medication List as of 7/15/2022  9:54 PM      START taking these medications    Details   dicyclomine (BENTYL) 20 MG tablet Take 1 tablet (20 mg) by mouth 4 times daily as needed (Abdominal pain), Disp-20 tablet, R-0, Local Print             Scribe Disclosure:  I, Erna Siu, am serving as a scribe at 7:57 PM on 7/15/2022 to document services personally performed by Darrel Fall MD based on my observations and the provider's statements to me.              Darrel Fall MD  07/15/22 7781

## 2022-09-12 ENCOUNTER — HOSPITAL ENCOUNTER (EMERGENCY)
Facility: CLINIC | Age: 47
Discharge: HOME | End: 2022-09-12
Attending: PHYSICIAN ASSISTANT | Admitting: PHYSICIAN ASSISTANT

## 2022-09-12 VITALS
DIASTOLIC BLOOD PRESSURE: 88 MMHG | RESPIRATION RATE: 20 BRPM | TEMPERATURE: 98.3 F | OXYGEN SATURATION: 97 % | HEART RATE: 56 BPM | SYSTOLIC BLOOD PRESSURE: 162 MMHG

## 2022-09-12 DIAGNOSIS — R11.0 NAUSEA: ICD-10-CM

## 2022-09-12 DIAGNOSIS — R10.12 LUQ ABDOMINAL PAIN: ICD-10-CM

## 2022-09-12 LAB
ALBUMIN SERPL BCG-MCNC: 4.3 G/DL (ref 3.5–5.2)
ALBUMIN UR-MCNC: 20 MG/DL
ALP SERPL-CCNC: 86 U/L (ref 35–104)
ALT SERPL W P-5'-P-CCNC: 42 U/L (ref 10–35)
ANION GAP SERPL CALCULATED.3IONS-SCNC: 11 MMOL/L (ref 7–15)
APPEARANCE UR: CLEAR
AST SERPL W P-5'-P-CCNC: 41 U/L (ref 10–35)
BACTERIA #/AREA URNS HPF: ABNORMAL /HPF
BASOPHILS # BLD AUTO: 0 10E3/UL (ref 0–0.2)
BASOPHILS NFR BLD AUTO: 0 %
BILIRUB SERPL-MCNC: 0.3 MG/DL
BILIRUB UR QL STRIP: NEGATIVE
BUN SERPL-MCNC: 11.3 MG/DL (ref 6–20)
CALCIUM SERPL-MCNC: 9.4 MG/DL (ref 8.6–10)
CHLORIDE SERPL-SCNC: 104 MMOL/L (ref 98–107)
COLOR UR AUTO: YELLOW
CREAT SERPL-MCNC: 0.67 MG/DL (ref 0.51–0.95)
DEPRECATED HCO3 PLAS-SCNC: 27 MMOL/L (ref 22–29)
EOSINOPHIL # BLD AUTO: 0.1 10E3/UL (ref 0–0.7)
EOSINOPHIL NFR BLD AUTO: 2 %
ERYTHROCYTE [DISTWIDTH] IN BLOOD BY AUTOMATED COUNT: 13.6 % (ref 10–15)
GFR SERPL CREATININE-BSD FRML MDRD: >90 ML/MIN/1.73M2
GLUCOSE SERPL-MCNC: 105 MG/DL (ref 70–99)
GLUCOSE UR STRIP-MCNC: NEGATIVE MG/DL
HCT VFR BLD AUTO: 42.2 % (ref 35–47)
HGB BLD-MCNC: 13.3 G/DL (ref 11.7–15.7)
HGB UR QL STRIP: NEGATIVE
HOLD SPECIMEN: NORMAL
HYALINE CASTS: 2 /LPF
IMM GRANULOCYTES # BLD: 0 10E3/UL
IMM GRANULOCYTES NFR BLD: 0 %
KETONES UR STRIP-MCNC: ABNORMAL MG/DL
LEUKOCYTE ESTERASE UR QL STRIP: NEGATIVE
LIPASE SERPL-CCNC: 25 U/L (ref 13–60)
LYMPHOCYTES # BLD AUTO: 2.6 10E3/UL (ref 0.8–5.3)
LYMPHOCYTES NFR BLD AUTO: 43 %
MCH RBC QN AUTO: 29.6 PG (ref 26.5–33)
MCHC RBC AUTO-ENTMCNC: 31.5 G/DL (ref 31.5–36.5)
MCV RBC AUTO: 94 FL (ref 78–100)
MONOCYTES # BLD AUTO: 0.4 10E3/UL (ref 0–1.3)
MONOCYTES NFR BLD AUTO: 6 %
MUCOUS THREADS #/AREA URNS LPF: PRESENT /LPF
NEUTROPHILS # BLD AUTO: 2.8 10E3/UL (ref 1.6–8.3)
NEUTROPHILS NFR BLD AUTO: 49 %
NITRATE UR QL: NEGATIVE
NRBC # BLD AUTO: 0 10E3/UL
NRBC BLD AUTO-RTO: 0 /100
PH UR STRIP: 5.5 [PH] (ref 5–7)
PLATELET # BLD AUTO: 335 10E3/UL (ref 150–450)
POTASSIUM SERPL-SCNC: 3.8 MMOL/L (ref 3.4–5.3)
PROT SERPL-MCNC: 7 G/DL (ref 6.4–8.3)
RBC # BLD AUTO: 4.49 10E6/UL (ref 3.8–5.2)
RBC URINE: <1 /HPF
SODIUM SERPL-SCNC: 142 MMOL/L (ref 136–145)
SP GR UR STRIP: 1.03 (ref 1–1.03)
SQUAMOUS EPITHELIAL: 3 /HPF
TROPONIN T SERPL HS-MCNC: 7 NG/L
UROBILINOGEN UR STRIP-MCNC: NORMAL MG/DL
WBC # BLD AUTO: 5.9 10E3/UL (ref 4–11)
WBC URINE: 2 /HPF

## 2022-09-12 PROCEDURE — 93005 ELECTROCARDIOGRAM TRACING: CPT

## 2022-09-12 PROCEDURE — 85025 COMPLETE CBC W/AUTO DIFF WBC: CPT | Performed by: PHYSICIAN ASSISTANT

## 2022-09-12 PROCEDURE — 81001 URINALYSIS AUTO W/SCOPE: CPT | Performed by: PHYSICIAN ASSISTANT

## 2022-09-12 PROCEDURE — 82310 ASSAY OF CALCIUM: CPT | Performed by: PHYSICIAN ASSISTANT

## 2022-09-12 PROCEDURE — 36415 COLL VENOUS BLD VENIPUNCTURE: CPT | Performed by: PHYSICIAN ASSISTANT

## 2022-09-12 PROCEDURE — 84484 ASSAY OF TROPONIN QUANT: CPT | Performed by: PHYSICIAN ASSISTANT

## 2022-09-12 PROCEDURE — 99284 EMERGENCY DEPT VISIT MOD MDM: CPT

## 2022-09-12 PROCEDURE — 250N000013 HC RX MED GY IP 250 OP 250 PS 637: Performed by: PHYSICIAN ASSISTANT

## 2022-09-12 PROCEDURE — 83690 ASSAY OF LIPASE: CPT | Performed by: PHYSICIAN ASSISTANT

## 2022-09-12 RX ORDER — OXYCODONE HYDROCHLORIDE 5 MG/1
5 TABLET ORAL ONCE
Status: COMPLETED | OUTPATIENT
Start: 2022-09-12 | End: 2022-09-12

## 2022-09-12 RX ORDER — FAMOTIDINE 20 MG/1
20 TABLET, FILM COATED ORAL 2 TIMES DAILY
Qty: 28 TABLET | Refills: 0 | Status: SHIPPED | OUTPATIENT
Start: 2022-09-12 | End: 2022-09-26

## 2022-09-12 RX ADMIN — OXYCODONE HYDROCHLORIDE 5 MG: 5 TABLET ORAL at 17:27

## 2022-09-12 RX ADMIN — OXYCODONE HYDROCHLORIDE 5 MG: 5 TABLET ORAL at 18:23

## 2022-09-12 ASSESSMENT — ENCOUNTER SYMPTOMS
ABDOMINAL PAIN: 1
NAUSEA: 1
DIARRHEA: 1

## 2022-09-12 ASSESSMENT — ACTIVITIES OF DAILY LIVING (ADL): ADLS_ACUITY_SCORE: 35

## 2022-09-12 NOTE — ED PROVIDER NOTES
History     Chief Complaint:  Abdominal Pain       HPI   Adriane Garrett is a 47 year old female who presents to the ED for evaluation of abdominal pain.  Patient reports onset of LUQ abdominal pain that began around 1100 yesterday.  Pain was present upon waking and has been constant since onset.  She was eating and moving seem to make her pain worse.  She describes the pain as sharp, and states that at times it seems to radiate into her back.  Right now she rates her pain as 9/10 in severity, and notes that around 0900 she took Tylenol ibuprofen without improvement of her symptoms.  She notes associated nausea as well as diarrhea.  She denies any fevers, chills, chest pain, dyspnea, vomiting, hematochezia, melena, or urinary symptoms.    ROS:  Review of Systems   Gastrointestinal: Positive for abdominal pain, diarrhea and nausea.   All other systems reviewed and are negative.      Allergies:  Ampicillin  Aspirin  Chantix [Varenicline]  Ciprofloxacin  Penicillins  Tessalon [Benzonatate]     Medications:    albuterol (PROAIR HFA/PROVENTIL HFA/VENTOLIN HFA) 108 (90 Base) MCG/ACT inhaler  albuterol (PROAIR HFA/PROVENTIL HFA/VENTOLIN HFA) 108 (90 Base) MCG/ACT inhaler  diclofenac (VOLTAREN) 1 % topical gel        Past Medical History:    Past Medical History:   Diagnosis Date     IBS (irritable bowel syndrome)        Past Surgical History:    Past Surgical History:   Procedure Laterality Date     FOOT SURGERY Right      GI SURGERY      cholecystectomy     GYN SURGERY      hysterectomy and tubal ligation     HC REMOVAL GALLBLADDER      Description: Cholecystectomy;  Recorded: 01/18/2012;     HYSTERECTOMY TOTAL ABDOMINAL, BILATERAL SALPINGO-OOPHORECTOMY, COMBINED      multiple surgeries        Family History:    family history includes Aneurysm in her father; Cerebrovascular Disease in her paternal uncle; Coronary Stenting in her brother; Diabetes in her child, mother, nephew, and sister; Heart Disease in her  brother.    Social History:   reports that she has been smoking. She uses smokeless tobacco. She reports current alcohol use. She reports previous drug use.    PCP: No Ref-Primary, Physician     Physical Exam     Patient Vitals for the past 24 hrs:   BP Temp Pulse Resp SpO2   09/12/22 1501 (!) 162/88 98.3  F (36.8  C) 56 20 97 %        Physical Exam  Constitutional: Pleasant. Cooperative.   Eyes: Pupils equally round and reactive  HENT: Head is normal in appearance. Oropharynx is normal with moist mucus membranes.  Cardiovascular: Regular rate and rhythm and without murmurs.  Respiratory: Normal respiratory effort, lungs are clear bilaterally.  GI: Abdomen is soft, non-tender, non-distended. No guarding, rebound, or rigidity.  Musculoskeletal: No asymmetry of the lower extremities, no tenderness to palpation.   Skin: Normal, without rash.  Neurologic: Cranial nerves grossly intact, normal cognition, no focal deficits. Alert and oriented x 3.   Psychiatric: Normal affect.  Nursing notes and vital signs reviewed.      Emergency Department Course   ECG:  ECG results from 09/12/22   EKG 12-lead, tracing only     Value    Systolic Blood Pressure     Diastolic Blood Pressure     Ventricular Rate 54    Atrial Rate 54    [place] Interval 172    QRS Duration 102        QTc 415    P Axis 55    R AXIS 1    T Axis 53    Interpretation ECG      Sinus bradycardia  Otherwise normal ECG  When compared with ECG of 17-MAY-2022 17:26,  No significant change was found       Laboratory:  Labs Ordered and Resulted from Time of ED Arrival to Time of ED Departure   COMPREHENSIVE METABOLIC PANEL - Abnormal       Result Value    Sodium 142      Potassium 3.8      Creatinine 0.67      Urea Nitrogen 11.3      Chloride 104      Carbon Dioxide (CO2) 27      Anion Gap 11      Glucose 105 (*)     Calcium 9.4      Protein Total 7.0      Albumin 4.3      Bilirubin Total 0.3      Alkaline Phosphatase 86      AST 41 (*)     ALT 42 (*)     GFR  Estimate >90     ROUTINE UA WITH MICROSCOPIC REFLEX TO CULTURE - Abnormal    Color Urine Yellow      Appearance Urine Clear      Glucose Urine Negative      Bilirubin Urine Negative      Ketones Urine Trace (*)     Specific Gravity Urine 1.029      Blood Urine Negative      pH Urine 5.5      Protein Albumin Urine 20  (*)     Urobilinogen Urine Normal      Nitrite Urine Negative      Leukocyte Esterase Urine Negative      Bacteria Urine Few (*)     Mucus Urine Present (*)     RBC Urine <1      WBC Urine 2      Squamous Epithelials Urine 3 (*)     Hyaline Casts Urine 2     LIPASE - Normal    Lipase 25     TROPONIN T, HIGH SENSITIVITY - Normal    Troponin T, High Sensitivity 7     CBC WITH PLATELETS AND DIFFERENTIAL    WBC Count 5.9      RBC Count 4.49      Hemoglobin 13.3      Hematocrit 42.2      MCV 94      MCH 29.6      MCHC 31.5      RDW 13.6      Platelet Count 335      % Neutrophils 49      % Lymphocytes 43      % Monocytes 6      % Eosinophils 2      % Basophils 0      % Immature Granulocytes 0      NRBCs per 100 WBC 0      Absolute Neutrophils 2.8      Absolute Lymphocytes 2.6      Absolute Monocytes 0.4      Absolute Eosinophils 0.1      Absolute Basophils 0.0      Absolute Immature Granulocytes 0.0      Absolute NRBCs 0.0        Emergency Department Course:    Reviewed:  I reviewed nursing notes, vitals, past medical history and Care Everywhere    Assessments:   I obtained history and examined the patient as noted above.    I rechecked the patient and explained findings.     Interventions:  Medications   oxyCODONE (ROXICODONE) tablet 5 mg (has no administration in time range)   oxyCODONE (ROXICODONE) tablet 5 mg (5 mg Oral Given 9/12/22 1727)        Disposition:  The patient was discharged to home.     Impression & Plan      Medical Decision Making:  Adriane Garrett is a 47 year old female who presents to ED for evaluation of abdominal pain.  See HPI as above for additional details.  Vitals and physical exam  as above.  Differential was broad and included pancreatitis, hepatitis, gastritis, PUD, SBO, perforated viscus, atypical ACS, amongst others.  Patient has a history of recurrent presentations to the emergency department for abdominal pain and has received multiple CT scans for this.  She has care plan.  Per care plan, labs obtained as above.  Fortunately, these all returned reassuring.  With shared decision making with patient, she would like to defer on imaging.  Abdominal exam is benign.  Patient provided pain medication as above.  Will trial course of famotidine with patient.  [place] patient was safe for discharge to home.  Advise low threshold to return to ED with any new or worsening symptoms.  All questions answered.  Patient discharged home in stable condition.    Diagnosis:    ICD-10-CM    1. LUQ abdominal pain  R10.12    2. Nausea  R11.0         Discharge Medications:  New Prescriptions    FAMOTIDINE (PEPCID) 20 MG TABLET    Take 1 tablet (20 mg) by mouth 2 times daily for 14 days        9/12/2022   Gerardo Salas PA-C     This record was created at least in part using electronic voice recognition software, so please excuse any typographical errors.       Gerardo Salas PA-C  09/12/22 [id number]

## 2022-09-12 NOTE — ED TRIAGE NOTES
Patient presents to the ED reporting LUQ abdominal pain since yesterday. States pain is worse with eating. Today has nausea and diarrhea. History of pancreatitis.

## 2022-09-12 NOTE — DISCHARGE INSTRUCTIONS
The cause of your abdominal pain is unclear at this time.  Based upon your lab work, there is no suggestion for life-threatening process.  We have elected to defer on CT scan of your abdomen due to the significant number of scans that you have received in the past. If your symptoms significantly worsen, return to the emergency department for further evaluation.

## 2022-09-13 LAB
ATRIAL RATE - MUSE: 54 BPM
DIASTOLIC BLOOD PRESSURE - MUSE: NORMAL MMHG
INTERPRETATION ECG - MUSE: NORMAL
P AXIS - MUSE: 55 DEGREES
PR INTERVAL - MUSE: 172 MS
QRS DURATION - MUSE: 102 MS
QT - MUSE: 438 MS
QTC - MUSE: 415 MS
R AXIS - MUSE: 1 DEGREES
SYSTOLIC BLOOD PRESSURE - MUSE: NORMAL MMHG
T AXIS - MUSE: 53 DEGREES
VENTRICULAR RATE- MUSE: 54 BPM

## 2022-09-15 ENCOUNTER — HOSPITAL ENCOUNTER (EMERGENCY)
Facility: CLINIC | Age: 47
Discharge: HOME | End: 2022-09-15
Attending: PHYSICIAN ASSISTANT | Admitting: PHYSICIAN ASSISTANT

## 2022-09-15 VITALS
TEMPERATURE: 97.5 F | RESPIRATION RATE: 18 BRPM | HEART RATE: 64 BPM | SYSTOLIC BLOOD PRESSURE: 152 MMHG | DIASTOLIC BLOOD PRESSURE: 76 MMHG | OXYGEN SATURATION: 98 %

## 2022-09-15 DIAGNOSIS — R10.9 CHRONIC ABDOMINAL PAIN: ICD-10-CM

## 2022-09-15 DIAGNOSIS — R10.12 LUQ ABDOMINAL PAIN: ICD-10-CM

## 2022-09-15 DIAGNOSIS — G89.29 CHRONIC ABDOMINAL PAIN: ICD-10-CM

## 2022-09-15 LAB
ALBUMIN SERPL BCG-MCNC: 4.1 G/DL (ref 3.5–5.2)
ALP SERPL-CCNC: 82 U/L (ref 35–104)
ALT SERPL W P-5'-P-CCNC: 33 U/L (ref 10–35)
ANION GAP SERPL CALCULATED.3IONS-SCNC: 10 MMOL/L (ref 7–15)
AST SERPL W P-5'-P-CCNC: 26 U/L (ref 10–35)
BASOPHILS # BLD AUTO: 0 10E3/UL (ref 0–0.2)
BASOPHILS NFR BLD AUTO: 1 %
BILIRUB SERPL-MCNC: 0.4 MG/DL
BUN SERPL-MCNC: 7.2 MG/DL (ref 6–20)
CALCIUM SERPL-MCNC: 9.1 MG/DL (ref 8.6–10)
CHLORIDE SERPL-SCNC: 104 MMOL/L (ref 98–107)
CREAT SERPL-MCNC: 0.62 MG/DL (ref 0.51–0.95)
DEPRECATED HCO3 PLAS-SCNC: 27 MMOL/L (ref 22–29)
EOSINOPHIL # BLD AUTO: 0.1 10E3/UL (ref 0–0.7)
EOSINOPHIL NFR BLD AUTO: 2 %
ERYTHROCYTE [DISTWIDTH] IN BLOOD BY AUTOMATED COUNT: 13.6 % (ref 10–15)
GFR SERPL CREATININE-BSD FRML MDRD: >90 ML/MIN/1.73M2
GLUCOSE SERPL-MCNC: 102 MG/DL (ref 70–99)
HCT VFR BLD AUTO: 41.1 % (ref 35–47)
HGB BLD-MCNC: 13 G/DL (ref 11.7–15.7)
IMM GRANULOCYTES # BLD: 0 10E3/UL
IMM GRANULOCYTES NFR BLD: 0 %
LIPASE SERPL-CCNC: 13 U/L (ref 13–60)
LYMPHOCYTES # BLD AUTO: 3.1 10E3/UL (ref 0.8–5.3)
LYMPHOCYTES NFR BLD AUTO: 47 %
MCH RBC QN AUTO: 29.5 PG (ref 26.5–33)
MCHC RBC AUTO-ENTMCNC: 31.6 G/DL (ref 31.5–36.5)
MCV RBC AUTO: 93 FL (ref 78–100)
MONOCYTES # BLD AUTO: 0.4 10E3/UL (ref 0–1.3)
MONOCYTES NFR BLD AUTO: 7 %
NEUTROPHILS # BLD AUTO: 2.8 10E3/UL (ref 1.6–8.3)
NEUTROPHILS NFR BLD AUTO: 43 %
NRBC # BLD AUTO: 0 10E3/UL
NRBC BLD AUTO-RTO: 0 /100
PLATELET # BLD AUTO: 318 10E3/UL (ref 150–450)
POTASSIUM SERPL-SCNC: 3.9 MMOL/L (ref 3.4–5.3)
PROT SERPL-MCNC: 7 G/DL (ref 6.4–8.3)
RBC # BLD AUTO: 4.4 10E6/UL (ref 3.8–5.2)
SODIUM SERPL-SCNC: 141 MMOL/L (ref 136–145)
TROPONIN T SERPL HS-MCNC: 11 NG/L
WBC # BLD AUTO: 6.4 10E3/UL (ref 4–11)

## 2022-09-15 PROCEDURE — 250N000013 HC RX MED GY IP 250 OP 250 PS 637: Performed by: EMERGENCY MEDICINE

## 2022-09-15 PROCEDURE — 85018 HEMOGLOBIN: CPT | Performed by: EMERGENCY MEDICINE

## 2022-09-15 PROCEDURE — 83690 ASSAY OF LIPASE: CPT | Performed by: EMERGENCY MEDICINE

## 2022-09-15 PROCEDURE — 250N000011 HC RX IP 250 OP 636: Performed by: PHYSICIAN ASSISTANT

## 2022-09-15 PROCEDURE — 84484 ASSAY OF TROPONIN QUANT: CPT | Performed by: PHYSICIAN ASSISTANT

## 2022-09-15 PROCEDURE — 250N000009 HC RX 250: Performed by: EMERGENCY MEDICINE

## 2022-09-15 PROCEDURE — 96374 THER/PROPH/DIAG INJ IV PUSH: CPT

## 2022-09-15 PROCEDURE — 99284 EMERGENCY DEPT VISIT MOD MDM: CPT | Mod: 25

## 2022-09-15 PROCEDURE — 36415 COLL VENOUS BLD VENIPUNCTURE: CPT | Performed by: EMERGENCY MEDICINE

## 2022-09-15 PROCEDURE — 80053 COMPREHEN METABOLIC PANEL: CPT | Performed by: EMERGENCY MEDICINE

## 2022-09-15 RX ORDER — ONDANSETRON 4 MG/1
4 TABLET, ORALLY DISINTEGRATING ORAL EVERY 6 HOURS PRN
Qty: 12 TABLET | Refills: 0 | Status: SHIPPED | OUTPATIENT
Start: 2022-09-15 | End: 2023-07-03

## 2022-09-15 RX ORDER — OXYCODONE HYDROCHLORIDE 5 MG/1
5 TABLET ORAL ONCE
Status: COMPLETED | OUTPATIENT
Start: 2022-09-15 | End: 2022-09-15

## 2022-09-15 RX ORDER — DICYCLOMINE HCL 20 MG
20 TABLET ORAL 2 TIMES DAILY PRN
Qty: 20 TABLET | Refills: 0 | Status: SHIPPED | OUTPATIENT
Start: 2022-09-15 | End: 2022-09-25

## 2022-09-15 RX ORDER — ONDANSETRON 2 MG/ML
4 INJECTION INTRAMUSCULAR; INTRAVENOUS ONCE
Status: COMPLETED | OUTPATIENT
Start: 2022-09-15 | End: 2022-09-15

## 2022-09-15 RX ADMIN — LIDOCAINE HYDROCHLORIDE 30 ML: 20 SOLUTION ORAL; TOPICAL at 14:02

## 2022-09-15 RX ADMIN — OXYCODONE HYDROCHLORIDE 5 MG: 5 TABLET ORAL at 14:02

## 2022-09-15 RX ADMIN — ONDANSETRON 4 MG: 2 INJECTION INTRAMUSCULAR; INTRAVENOUS at 16:30

## 2022-09-15 ASSESSMENT — ENCOUNTER SYMPTOMS
DIARRHEA: 1
ABDOMINAL PAIN: 1
NAUSEA: 1
DYSURIA: 0
FEVER: 0
BLOOD IN STOOL: 0
HEMATURIA: 0
VOMITING: 0

## 2022-09-15 ASSESSMENT — ACTIVITIES OF DAILY LIVING (ADL)
ADLS_ACUITY_SCORE: 35
ADLS_ACUITY_SCORE: 35

## 2022-09-15 NOTE — ED PROVIDER NOTES
History   Chief Complaint:  Abdominal Pain       The history is provided by the patient.      Adriane Garrett is a 47 year old female with history of hypertension and chronic abdominal pain who presents with LUQ abdominal pain. The onset of her abdominal pain was 3 days ago and is accompanied with nausea and diarrhea. She has 4 episodes of diarrhea a day. She reported to the ED 2 days ago regarding her symptoms and was prescribed Pepcid. She has been taking Pepcid twice a day since Monday with no relief of her abdominal pain. She has omeprazole at home but refrained from taking it because she was unsure about taking it with her Pepcid. She has also taken Tylenol and ibuprofen, both of which provided no relief. She is taking imodium as well. She denies vomiting, blood in stool, fever, dysuria, or hematuria. She is not on blood thinners. She denies overnight hospital visits, antibiotic use, or travel outside of the country.  No chest pain or SOB.  No rashes or hx of Afib.    Review of Systems   Constitutional: Negative for fever.   Gastrointestinal: Positive for abdominal pain, diarrhea and nausea. Negative for blood in stool and vomiting.   Genitourinary: Negative for dysuria and hematuria.   All other systems reviewed and are negative.    Allergies:  Ampicillin  Aspirin  Chantix [Varenicline]  Ciprofloxacin  Penicillins  Tessalon [Benzonatate]     Medications:  Albuterol inhaler     Past Medical History:     Vitamin D deficiency  Smoker  Pancreatitis   Chronic nonalcoholic liver disease  Morbid obesity  Migraine headache  IBS  Hypertension  Hyperlipidemia  GERD  Bipolar 1 disorder  Asthma, mild intermittent  Ovarian cyst     Past Surgical History:    Foot surgery, right  Cholecystectomy  Hysterectomy and tubal ligation  Myringotomy, bilateral  EGD with biopsy     Family History:    Mother - diabetes  Sister - diabetes  Child - diabetes  Brother - heart disease, coronary stenting  Father - aneurysm     Social  History:  The patient presents to the ED alone.    Physical Exam     Patient Vitals for the past 24 hrs:   BP Temp Temp src Pulse Resp SpO2   09/15/22 1703 (!) 152/76 -- -- 64 18 98 %   09/15/22 1311 (!) 160/80 -- -- -- -- --   09/15/22 1309 -- 97.5  F (36.4  C) Temporal 54 18 96 %       Physical Exam   General: Awake, alert, non-toxic. No apparent distress.  Head:  Scalp is NC/AT  Eyes:  Conjunctiva normal, PERRL  ENT:  The external nose and ears are normal.   Neck:  Normal range of motion without rigidity.  CV:  Regular rate and rhythm    No pathologic murmur, rubs, or gallops.  Resp:  Breath sounds are clear bilaterally    Non-labored, no retractions or accessory muscle use  Abdomen: Abdomen is soft, no distension, mild epigastric and LUQ tenderness, no masses. No rebound or guarding.  No CVA tenderness.  MS:  No lower extremity edema or asymmetric calf swelling. No midline cervical, thoracic, or lumbar tenderness  Skin:  Warm and dry, No rash or lesions noted.  Neuro: Alert and oriented.  GCS 15 No gross motor deficits.  No facial asymmetry.  Psych:  Awake. Alert. Normal affect. Appropriate interactions.    Emergency Department Course     Laboratory:  Labs Ordered and Resulted from Time of ED Arrival to Time of ED Departure   COMPREHENSIVE METABOLIC PANEL - Abnormal       Result Value    Sodium 141      Potassium 3.9      Chloride 104      Carbon Dioxide (CO2) 27      Anion Gap 10      Urea Nitrogen 7.2      Creatinine 0.62      Calcium 9.1      Glucose 102 (*)     Alkaline Phosphatase 82      AST 26      ALT 33      Protein Total 7.0      Albumin 4.1      Bilirubin Total 0.4      GFR Estimate >90     LIPASE - Normal    Lipase 13     TROPONIN T, HIGH SENSITIVITY - Normal    Troponin T, High Sensitivity 11     CBC WITH PLATELETS AND DIFFERENTIAL    WBC Count 6.4      RBC Count 4.40      Hemoglobin 13.0      Hematocrit 41.1      MCV 93      MCH 29.5      MCHC 31.6      RDW 13.6      Platelet Count 318      %  Neutrophils 43      % Lymphocytes 47      % Monocytes 7      % Eosinophils 2      % Basophils 1      % Immature Granulocytes 0      NRBCs per 100 WBC 0      Absolute Neutrophils 2.8      Absolute Lymphocytes 3.1      Absolute Monocytes 0.4      Absolute Eosinophils 0.1      Absolute Basophils 0.0      Absolute Immature Granulocytes 0.0      Absolute NRBCs 0.0          Emergency Department Course:    Reviewed:  I reviewed nursing notes, vitals, past medical history and Care Everywhere    Assessments:  1613 I obtained history and examined the patient as noted above.   1654 I rechecked the patient and explained findings.     Interventions:  1402 GI Cocktail (Maalox/Mylanta and viscous Lidocaine), 30 mL suspension, PO   1402 Roxicodone 5 mg PO  1630 Zofran 4 mg IV    Disposition:  The patient was discharged to home.     Impression & Plan     Medical Decision Makin-year-old female with history of chronic abdominal pain presents with left upper quadrant and epigastric discomfort with some associated diarrhea.  Patient has long history of numerous presentations for chronic abdominal and chest pain with negative imaging studies.  Work-up here is reassuring.  Labs are unremarkable.  Her exam is benign.  Per care plan no indication for emergent imaging and very low suspicion for more serious causes of symptoms such as splenic infarction, bowel perforation, incarcerated hiatal hernia, abscess, bowel obstruction, diverticulitis, pancreatitis, fulminant colitis, appendicitis, mesenteric ischemia, AAA etc.  She is status postcholecystectomy and LFTs and lipase are unremarkable.  No urinary symptoms.  tRoponin is within normal limits despite ongoing symptoms for many days just had an EKG several days ago that was normal symptoms clearly reproducible and very low suspicion for referred pain from ACS, PE, thoracic dissection, pneumonia etc.  No indication for stool testing at this time and no further bouts of diarrhea during  the patient's 4 hours in the ER.  Plan will be Zofran and Bentyl.  Discussed opioid policy in this setting of chronic pain.  We decided together to forego CT imaging at this time given patient's numerous imaging studies and the risk of radiation.  She will follow-up closely with her PCP in 1 to 2 days and return immediately if increasing pain fever bloody stools chest pain shortness of breath intractable vomiting etc.    Diagnosis:    ICD-10-CM    1. Chronic abdominal pain  R10.9     G89.29    2. LUQ abdominal pain  R10.12        Discharge Medications:  Discharge Medication List as of 9/15/2022  4:57 PM      START taking these medications    Details   dicyclomine (BENTYL) 20 MG tablet Take 1 tablet (20 mg) by mouth 2 times daily as needed (Abdominal pain, diarrhea), Disp-20 tablet, R-0, E-Prescribe      ondansetron (ZOFRAN ODT) 4 MG ODT tab Take 1 tablet (4 mg) by mouth every 6 hours as needed for nausea or vomiting, Disp-12 tablet, R-0, E-Prescribe             Scribe Disclosure:  I, JHONY HEBERT, am serving as a scribe at 4:13 PM on 9/15/2022 to document services personally performed by Tj Jose PA-C based on my observations and the provider's statements to me.        Tj Jose PA-C  09/15/22 6220

## 2022-09-15 NOTE — ED NOTES
Care Management Follow Up    Length of Stay (days): 0    Expected Discharge Date:       Concerns to be Addressed:       Patient plan of care discussed at interdisciplinary rounds: No    Anticipated Discharge Disposition: Home     Anticipated Discharge Services: None  Anticipated Discharge DME: None    Patient/family educated on Medicare website which has current facility and service quality ratings: no  Education Provided on the Discharge Plan:    Patient/Family in Agreement with the Plan: yes    Referrals Placed by CM/SW:    Private pay costs discussed: Not applicable    Additional Information:  Patient has an ED tx plan and has had 15 ER visits. Patient does not have a PCP or insurance on file. SW met with patient at bedside. Patient reports she has filled out the application for insurance but then her mother in law threw it away. She has had trouble completing it online. SW provided [facility] Resource guide with information on how to apply for insurance. SW also provided information on low-cost clinics in the area for people who do not have insurance.     NAIDA Jones, [facility]  Emergency Room   [phone]-Please contact the SW on the floor in which the patient is staying for any questions or concerns

## 2022-09-15 NOTE — CONSULTS
Care Management Discharge Note    Discharge Date:         Discharge Disposition: Home    Discharge Services: None    Discharge DME: None    Additional Information:  See ED note for SW consult.     NAIDA Jones, [facility]  Emergency Room   [phone]-Please contact the SW on the floor in which the patient is staying for any questions or concerns

## 2022-09-15 NOTE — ED PROVIDER NOTES
Rapid Assessment Note    History:   Adriane Garrett is a 47 year old female who presents with abdominal pain.  LUQ abd pain x 3 days.  Seen in ED recently.  Not improving with Pepcid.  Instructed to return if worse.  No fevers or chills    Exam:   TTP to LUQ    Plan of Care:   I evaluated the patient and developed an initial plan of care. I discussed this plan and explained that I, or one of my partners, would be returning to complete the evaluation.     09/15/2022  [employer]    Portions of this medical record were completed by a scribe. UPON MY REVIEW AND AUTHENTICATION BY ELECTRONIC SIGNATURE, this confirms (a) I performed the applicable clinical services, and (b) the record is accurate.        Everett Wynne MD  09/15/22 2461

## 2022-09-15 NOTE — ED TRIAGE NOTES
Pt arrives with c/o continued abdominal pain and nausea from visit 3 days ago.      Triage Assessment     Row Name 09/15/22 5600       Triage Assessment (Adult)    Airway WDL WDL       Respiratory WDL    Respiratory WDL WDL       Cardiac WDL    Cardiac WDL WDL       Cognitive/Neuro/Behavioral WDL    Cognitive/Neuro/Behavioral WDL WDL

## 2022-10-04 ENCOUNTER — APPOINTMENT (OUTPATIENT)
Dept: CT IMAGING | Facility: CLINIC | Age: 47
End: 2022-10-04
Attending: EMERGENCY MEDICINE

## 2022-10-04 ENCOUNTER — HOSPITAL ENCOUNTER (EMERGENCY)
Facility: CLINIC | Age: 47
Discharge: HOME | End: 2022-10-05
Attending: EMERGENCY MEDICINE | Admitting: EMERGENCY MEDICINE

## 2022-10-04 DIAGNOSIS — R51.9 ACUTE NONINTRACTABLE HEADACHE, UNSPECIFIED HEADACHE TYPE: ICD-10-CM

## 2022-10-04 LAB
FLUAV RNA SPEC QL NAA+PROBE: NEGATIVE
FLUBV RNA RESP QL NAA+PROBE: NEGATIVE
HCG SERPL QL: NEGATIVE
HOLD SPECIMEN: NORMAL
HOLD SPECIMEN: NORMAL
RSV RNA SPEC NAA+PROBE: NEGATIVE
SARS-COV-2 RNA RESP QL NAA+PROBE: NEGATIVE

## 2022-10-04 PROCEDURE — 36415 COLL VENOUS BLD VENIPUNCTURE: CPT | Performed by: EMERGENCY MEDICINE

## 2022-10-04 PROCEDURE — 96376 TX/PRO/DX INJ SAME DRUG ADON: CPT

## 2022-10-04 PROCEDURE — 96366 THER/PROPH/DIAG IV INF ADDON: CPT

## 2022-10-04 PROCEDURE — 96365 THER/PROPH/DIAG IV INF INIT: CPT | Mod: 59

## 2022-10-04 PROCEDURE — 70496 CT ANGIOGRAPHY HEAD: CPT

## 2022-10-04 PROCEDURE — 250N000011 HC RX IP 250 OP 636: Performed by: EMERGENCY MEDICINE

## 2022-10-04 PROCEDURE — 250N000009 HC RX 250: Performed by: EMERGENCY MEDICINE

## 2022-10-04 PROCEDURE — C9803 HOPD COVID-19 SPEC COLLECT: HCPCS

## 2022-10-04 PROCEDURE — 87637 SARSCOV2&INF A&B&RSV AMP PRB: CPT | Performed by: EMERGENCY MEDICINE

## 2022-10-04 PROCEDURE — 99285 EMERGENCY DEPT VISIT HI MDM: CPT | Mod: 25

## 2022-10-04 PROCEDURE — 96361 HYDRATE IV INFUSION ADD-ON: CPT

## 2022-10-04 PROCEDURE — 96375 TX/PRO/DX INJ NEW DRUG ADDON: CPT

## 2022-10-04 PROCEDURE — 70450 CT HEAD/BRAIN W/O DYE: CPT

## 2022-10-04 PROCEDURE — 84703 CHORIONIC GONADOTROPIN ASSAY: CPT | Performed by: EMERGENCY MEDICINE

## 2022-10-04 PROCEDURE — 258N000003 HC RX IP 258 OP 636: Performed by: EMERGENCY MEDICINE

## 2022-10-04 PROCEDURE — 70498 CT ANGIOGRAPHY NECK: CPT

## 2022-10-04 RX ORDER — MAGNESIUM SULFATE 1 G/100ML
1 INJECTION INTRAVENOUS ONCE
Status: COMPLETED | OUTPATIENT
Start: 2022-10-04 | End: 2022-10-05

## 2022-10-04 RX ORDER — ONDANSETRON 2 MG/ML
8 INJECTION INTRAMUSCULAR; INTRAVENOUS ONCE
Status: COMPLETED | OUTPATIENT
Start: 2022-10-04 | End: 2022-10-04

## 2022-10-04 RX ORDER — KETOROLAC TROMETHAMINE 15 MG/ML
15 INJECTION, SOLUTION INTRAMUSCULAR; INTRAVENOUS ONCE
Status: COMPLETED | OUTPATIENT
Start: 2022-10-04 | End: 2022-10-04

## 2022-10-04 RX ORDER — LIDOCAINE HYDROCHLORIDE 40 MG/ML
.5-1 INJECTION, SOLUTION RETROBULBAR ONCE
Status: COMPLETED | OUTPATIENT
Start: 2022-10-04 | End: 2022-10-04

## 2022-10-04 RX ORDER — IOPAMIDOL 755 MG/ML
100 INJECTION, SOLUTION INTRAVASCULAR ONCE
Status: COMPLETED | OUTPATIENT
Start: 2022-10-04 | End: 2022-10-04

## 2022-10-04 RX ORDER — METOCLOPRAMIDE HYDROCHLORIDE 5 MG/ML
10 INJECTION INTRAMUSCULAR; INTRAVENOUS ONCE
Status: COMPLETED | OUTPATIENT
Start: 2022-10-04 | End: 2022-10-04

## 2022-10-04 RX ORDER — SUMATRIPTAN 25 MG/1
TABLET, FILM COATED ORAL
Qty: 8 TABLET | Refills: 0 | Status: SHIPPED | OUTPATIENT
Start: 2022-10-04

## 2022-10-04 RX ORDER — METOCLOPRAMIDE 10 MG/1
10 TABLET ORAL
Qty: 15 TABLET | Refills: 0 | Status: SHIPPED | OUTPATIENT
Start: 2022-10-04 | End: 2023-07-03

## 2022-10-04 RX ORDER — DEXAMETHASONE SODIUM PHOSPHATE 10 MG/ML
10 INJECTION, SOLUTION INTRAMUSCULAR; INTRAVENOUS ONCE
Status: COMPLETED | OUTPATIENT
Start: 2022-10-04 | End: 2022-10-04

## 2022-10-04 RX ADMIN — ONDANSETRON 8 MG: 2 INJECTION INTRAMUSCULAR; INTRAVENOUS at 17:54

## 2022-10-04 RX ADMIN — SODIUM CHLORIDE 80 ML: 9 INJECTION, SOLUTION INTRAVENOUS at 20:55

## 2022-10-04 RX ADMIN — KETOROLAC TROMETHAMINE 15 MG: 15 INJECTION, SOLUTION INTRAMUSCULAR; INTRAVENOUS at 19:59

## 2022-10-04 RX ADMIN — MAGNESIUM SULFATE HEPTAHYDRATE 1 G: 1 INJECTION, SOLUTION INTRAVENOUS at 22:10

## 2022-10-04 RX ADMIN — IOPAMIDOL 75 ML: 755 INJECTION, SOLUTION INTRAVENOUS at 20:53

## 2022-10-04 RX ADMIN — LIDOCAINE HYDROCHLORIDE 1 ML: 40 INJECTION, SOLUTION RETROBULBAR; TOPICAL at 20:04

## 2022-10-04 RX ADMIN — DEXAMETHASONE SODIUM PHOSPHATE 10 MG: 10 INJECTION, SOLUTION INTRAMUSCULAR; INTRAVENOUS at 20:01

## 2022-10-04 RX ADMIN — METOCLOPRAMIDE HYDROCHLORIDE 10 MG: 5 INJECTION INTRAMUSCULAR; INTRAVENOUS at 19:54

## 2022-10-04 RX ADMIN — SODIUM CHLORIDE 1000 ML: 9 INJECTION, SOLUTION INTRAVENOUS at 17:53

## 2022-10-04 RX ADMIN — KETOROLAC TROMETHAMINE 15 MG: 15 INJECTION, SOLUTION INTRAMUSCULAR; INTRAVENOUS at 17:54

## 2022-10-04 ASSESSMENT — ENCOUNTER SYMPTOMS
PHOTOPHOBIA: 1
HEADACHES: 1

## 2022-10-04 ASSESSMENT — ACTIVITIES OF DAILY LIVING (ADL)
ADLS_ACUITY_SCORE: 35

## 2022-10-04 NOTE — ED PROVIDER NOTES
Rapid Assessment Note    History:     Adriane aGrrett is a 47 year old female who presents with headache.  Patient had the onset headache 2 days prior to arrival.  Headache is worsened by lights.  She has been nauseous and vomiting.  Pain typically improves with ibuprofen and Benadryl but did not today prompting evaluation.  She otherwise denies additional symptoms including fever, nausea or vomiting    Exam:   General:  Alert, interactive  Cardiovascular:  Well perfused  Lungs:  No respiratory distress, no accessory muscle use  Neuro:  CN II-XII intact, speech is clear with no aphasia.     Strength is 5/5 in all 4 extremities.  Sensation is intact.     Normal muscular tone, no tremor.   Speech is clear  Skin:  Warm, dry  Psych:  Normal affect    Plan of Care:   I evaluated the patient and developed an initial plan of care. I discussed this plan and explained that I, or one of my partners, would be returning to complete the evaluation.  Treatment being started for suspected migraine headache.      10/4/2022  [employer]    Portions of this medical record were completed by a scribe. UPON MY REVIEW AND AUTHENTICATION BY ELECTRONIC SIGNATURE, this confirms (a) I performed the applicable clinical services, and (b) the record is accurate.        French Harmon MD  10/04/22 8706

## 2022-10-04 NOTE — ED TRIAGE NOTES
Headache since 8pm Sunday. Unresolved with OTC migraine medications, and ibuprofen.   Pain 8/10. Denies visual changes or other neuro defects.  Endorses nausea.    ABCs intact A&Ox4.     Triage Assessment     Row Name 10/04/22 0326       Triage Assessment (Adult)    Airway WDL WDL       Respiratory WDL    Respiratory WDL WDL       Cognitive/Neuro/Behavioral WDL    Cognitive/Neuro/Behavioral WDL WDL

## 2022-10-05 VITALS
RESPIRATION RATE: 18 BRPM | DIASTOLIC BLOOD PRESSURE: 88 MMHG | HEART RATE: 47 BPM | TEMPERATURE: 97.7 F | OXYGEN SATURATION: 96 % | SYSTOLIC BLOOD PRESSURE: 112 MMHG

## 2022-10-05 NOTE — DISCHARGE INSTRUCTIONS
Please follow-up with your neurologist as your headache is not found to be in the emergency room.  We have chosen to repeat a CT and CT angiogram due to your family history of headaches and you describing this is different than your regular headaches.  We have found no intracranial cause for your headache.  Please use medication as provided.  Please return with fever or other new symptoms.

## 2022-10-05 NOTE — ED PROVIDER NOTES
History   Chief Complaint:  Headache       HPI   Adriane Garrett is a 47 year old female with history of migraines and hypertension who presents with a constant headache since Sunday at 2000. She has a history of migraines but states that this is different. She typically alleviates her headache through 4 ibuprofen and 2 benadryl but this has not alleviated her symptoms today. She has photophobia associated with her headache. She has not been sleeping well either. She has a significant family history of migraines and brain aneurysms.     Review of Systems   Eyes: Positive for photophobia.   Neurological: Positive for headaches.   All other systems reviewed and are negative.    Allergies:  Ampicillin  Aspirin  Chantix [Varenicline]  Ciprofloxacin  Penicillins  Tessalon [Benzonatate]     Medications:  Albuterol inhaler     Past Medical History:     Vitamin D deficiency  Smoker  Pancreatitis   Chronic nonalcoholic liver disease  Morbid obesity  Migraine headache  IBS  Hypertension  Hyperlipidemia  GERD  Bipolar 1 disorder  Asthma, mild intermittent  Ovarian cyst     Past Surgical History:    Foot surgery, right  Cholecystectomy  Hysterectomy and tubal ligation  Myringotomy, bilateral  EGD with biopsy     Family History:    Mother - diabetes  Sister - diabetes  Child - diabetes  Brother - heart disease, coronary stenting  Father - aneurysm    Social History:  She reports to the ED alone.     Physical Exam     Patient Vitals for the past 24 hrs:   BP Temp Temp src Pulse Resp SpO2   10/04/22 1627 (!) 195/97 97.7  F (36.5  C) Temporal 56 18 97 %       Physical Exam  Vitals reviewed.   Constitutional:       Appearance: She is obese.   HENT:      Head: Normocephalic.      Mouth/Throat:      Mouth: Mucous membranes are moist.   Eyes:      Pupils: Pupils are equal, round, and reactive to light.   Cardiovascular:      Rate and Rhythm: Normal rate.   Pulmonary:      Effort: Pulmonary effort is normal.   Abdominal:      General:  Abdomen is flat.      Palpations: Abdomen is soft.   Musculoskeletal:         General: Normal range of motion.   Skin:     Capillary Refill: Capillary refill takes less than 2 seconds.   Neurological:      General: No focal deficit present.      Mental Status: She is alert and oriented to person, place, and time.   Psychiatric:         Mood and Affect: Mood normal.         Emergency Department Course     Imaging:  CT Head Neck Angio w/o & w Contrast   Final Result   IMPRESSION:    HEAD CTA:    1.  No evidence of pathologic vascular occlusion or saccular within the visualized intracranial circulation by CT angiography.      NECK CTA:   1.  No evidence of hemodynamically significant stenosis within either internal carotid artery within the neck.      CT Head w/o Contrast   Final Result   IMPRESSION:     1.  No evidence of acute intracranial hemorrhage or mass effect.        Report per radiology    Laboratory:  Labs Ordered and Resulted from Time of ED Arrival to Time of ED Departure   INFLUENZA A/B & SARS-COV2 PCR MULTIPLEX - Normal       Result Value    Influenza A PCR Negative      Influenza B PCR Negative      RSV PCR Negative      SARS CoV2 PCR Negative     HCG QUALITATIVE PREGNANCY - Normal    hCG Serum Qualitative Negative       Emergency Department Course:    Reviewed:  I reviewed nursing notes, vitals, past medical history and Care Everywhere    Assessments:  1918 I obtained history and examined the patient as noted above.   1926 I rechecked the patient and explained plan for imaging.   2127 I rechecked the patient and explained findings. She is feeling slightly better at this time.   2142 I rechecked the patient and explained imaging results. I discussed plan for discharge.   2317 I rechecked the patient and she is sitting up and texting on her cellphone. She reports that her headache is a little bit better.     Interventions:  1753 NS 1000 mL IV  1754 Toradol 15 mg IV  1754 Zofran 8 mg IV  1954 Reglan 10 mg  IV  1959 Toradol 15 mg IV  2001 Decadron 10 mg IV  2004 Lidocaine 4% 1 mL Intranasal  2210 Magnesium sulfate 1 gm in 100 mL D5W intermittent infusion    Disposition:  The patient was discharged to home.     Impression & Plan     Medical Decision Making:  Patient presents with acute headache.  Patient describes a sudden onset and severe.  Evaluation was entertained including CT and CT angiography this was normal.  Headache was mitigated using medications for migraine-like headache.  No concerns for CVT.  No concerns for meningitis.  Patient is offered reassurance and discharged home.    Diagnosis:    ICD-10-CM    1. Acute nonintractable headache, unspecified headache type  R51.9        Discharge Medications:  Discharge Medication List as of 10/5/2022 12:38 AM      START taking these medications    Details   metoclopramide (REGLAN) 10 MG tablet Take 1 tablet (10 mg) by mouth 4 times daily (before meals and nightly), Disp-15 tablet, R-0, Local PrintTAKE WITH 25-50MG OF BENADRYL FOR HEADACHE      SUMAtriptan (IMITREX) 25 MG tablet Take 25-100mg one time. May repeat 25mg every two hours up to a maximum of 200mg in 24 hours., Disp-8 tablet, R-0, Local Print             Scribe Disclosure:  I, JHONY HEBERT, am serving as a scribe at 7:18 PM on 10/4/2022 to document services personally performed by Everett Perdomo MD based on my observations and the provider's statements to me.          Everett Perdomo MD  01/17/23 8061

## 2022-11-22 ENCOUNTER — HOSPITAL ENCOUNTER (EMERGENCY)
Facility: CLINIC | Age: 47
Discharge: HOME | End: 2022-11-22
Attending: EMERGENCY MEDICINE | Admitting: EMERGENCY MEDICINE

## 2022-11-22 VITALS
HEART RATE: 51 BPM | RESPIRATION RATE: 18 BRPM | OXYGEN SATURATION: 97 % | TEMPERATURE: 98.8 F | SYSTOLIC BLOOD PRESSURE: 145 MMHG | DIASTOLIC BLOOD PRESSURE: 78 MMHG

## 2022-11-22 DIAGNOSIS — R10.12 LUQ ABDOMINAL PAIN: ICD-10-CM

## 2022-11-22 LAB
ALBUMIN SERPL BCG-MCNC: 4.5 G/DL (ref 3.5–5.2)
ALBUMIN UR-MCNC: NEGATIVE MG/DL
ALP SERPL-CCNC: 95 U/L (ref 35–104)
ALT SERPL W P-5'-P-CCNC: 30 U/L (ref 10–35)
ANION GAP SERPL CALCULATED.3IONS-SCNC: 12 MMOL/L (ref 7–15)
APPEARANCE UR: CLEAR
AST SERPL W P-5'-P-CCNC: 27 U/L (ref 10–35)
BASOPHILS # BLD AUTO: 0 10E3/UL (ref 0–0.2)
BASOPHILS NFR BLD AUTO: 0 %
BILIRUB DIRECT SERPL-MCNC: <0.2 MG/DL (ref 0–0.3)
BILIRUB SERPL-MCNC: 0.4 MG/DL
BILIRUB UR QL STRIP: NEGATIVE
BUN SERPL-MCNC: 9.9 MG/DL (ref 6–20)
CALCIUM SERPL-MCNC: 9.4 MG/DL (ref 8.6–10)
CHLORIDE SERPL-SCNC: 103 MMOL/L (ref 98–107)
COLOR UR AUTO: ABNORMAL
CREAT SERPL-MCNC: 0.58 MG/DL (ref 0.51–0.95)
DEPRECATED HCO3 PLAS-SCNC: 27 MMOL/L (ref 22–29)
EOSINOPHIL # BLD AUTO: 0.1 10E3/UL (ref 0–0.7)
EOSINOPHIL NFR BLD AUTO: 2 %
ERYTHROCYTE [DISTWIDTH] IN BLOOD BY AUTOMATED COUNT: 13.5 % (ref 10–15)
GFR SERPL CREATININE-BSD FRML MDRD: >90 ML/MIN/1.73M2
GLUCOSE SERPL-MCNC: 92 MG/DL (ref 70–99)
GLUCOSE UR STRIP-MCNC: NEGATIVE MG/DL
HCT VFR BLD AUTO: 43.3 % (ref 35–47)
HGB BLD-MCNC: 13.7 G/DL (ref 11.7–15.7)
HGB UR QL STRIP: NEGATIVE
HYALINE CASTS: 1 /LPF
IMM GRANULOCYTES # BLD: 0 10E3/UL
IMM GRANULOCYTES NFR BLD: 0 %
KETONES UR STRIP-MCNC: NEGATIVE MG/DL
LEUKOCYTE ESTERASE UR QL STRIP: NEGATIVE
LIPASE SERPL-CCNC: 18 U/L (ref 13–60)
LYMPHOCYTES # BLD AUTO: 3.3 10E3/UL (ref 0.8–5.3)
LYMPHOCYTES NFR BLD AUTO: 48 %
MCH RBC QN AUTO: 29.4 PG (ref 26.5–33)
MCHC RBC AUTO-ENTMCNC: 31.6 G/DL (ref 31.5–36.5)
MCV RBC AUTO: 93 FL (ref 78–100)
MONOCYTES # BLD AUTO: 0.4 10E3/UL (ref 0–1.3)
MONOCYTES NFR BLD AUTO: 6 %
MUCOUS THREADS #/AREA URNS LPF: PRESENT /LPF
NEUTROPHILS # BLD AUTO: 3 10E3/UL (ref 1.6–8.3)
NEUTROPHILS NFR BLD AUTO: 44 %
NITRATE UR QL: NEGATIVE
NRBC # BLD AUTO: 0 10E3/UL
NRBC BLD AUTO-RTO: 0 /100
PH UR STRIP: 5.5 [PH] (ref 5–7)
PLATELET # BLD AUTO: 370 10E3/UL (ref 150–450)
POTASSIUM SERPL-SCNC: 4.2 MMOL/L (ref 3.4–5.3)
PROT SERPL-MCNC: 7.2 G/DL (ref 6.4–8.3)
RBC # BLD AUTO: 4.66 10E6/UL (ref 3.8–5.2)
RBC URINE: 1 /HPF
SODIUM SERPL-SCNC: 142 MMOL/L (ref 136–145)
SP GR UR STRIP: 1.02 (ref 1–1.03)
SQUAMOUS EPITHELIAL: <1 /HPF
UROBILINOGEN UR STRIP-MCNC: NORMAL MG/DL
WBC # BLD AUTO: 6.9 10E3/UL (ref 4–11)
WBC URINE: 1 /HPF

## 2022-11-22 PROCEDURE — 250N000011 HC RX IP 250 OP 636: Performed by: EMERGENCY MEDICINE

## 2022-11-22 PROCEDURE — 96374 THER/PROPH/DIAG INJ IV PUSH: CPT

## 2022-11-22 PROCEDURE — 82248 BILIRUBIN DIRECT: CPT | Performed by: EMERGENCY MEDICINE

## 2022-11-22 PROCEDURE — 81001 URINALYSIS AUTO W/SCOPE: CPT | Performed by: EMERGENCY MEDICINE

## 2022-11-22 PROCEDURE — 85025 COMPLETE CBC W/AUTO DIFF WBC: CPT | Performed by: EMERGENCY MEDICINE

## 2022-11-22 PROCEDURE — 83690 ASSAY OF LIPASE: CPT | Performed by: EMERGENCY MEDICINE

## 2022-11-22 PROCEDURE — 250N000009 HC RX 250: Performed by: EMERGENCY MEDICINE

## 2022-11-22 PROCEDURE — 250N000013 HC RX MED GY IP 250 OP 250 PS 637: Performed by: EMERGENCY MEDICINE

## 2022-11-22 PROCEDURE — 80048 BASIC METABOLIC PNL TOTAL CA: CPT | Performed by: EMERGENCY MEDICINE

## 2022-11-22 PROCEDURE — 80053 COMPREHEN METABOLIC PANEL: CPT | Performed by: EMERGENCY MEDICINE

## 2022-11-22 PROCEDURE — 36415 COLL VENOUS BLD VENIPUNCTURE: CPT | Performed by: EMERGENCY MEDICINE

## 2022-11-22 PROCEDURE — 99284 EMERGENCY DEPT VISIT MOD MDM: CPT | Mod: 25

## 2022-11-22 PROCEDURE — 96375 TX/PRO/DX INJ NEW DRUG ADDON: CPT

## 2022-11-22 RX ORDER — DICYCLOMINE HCL 20 MG
20 TABLET ORAL 2 TIMES DAILY
Qty: 20 TABLET | Refills: 0 | Status: SHIPPED | OUTPATIENT
Start: 2022-11-22 | End: 2022-12-02

## 2022-11-22 RX ORDER — ONDANSETRON 2 MG/ML
4 INJECTION INTRAMUSCULAR; INTRAVENOUS ONCE
Status: COMPLETED | OUTPATIENT
Start: 2022-11-22 | End: 2022-11-22

## 2022-11-22 RX ORDER — KETOROLAC TROMETHAMINE 15 MG/ML
15 INJECTION, SOLUTION INTRAMUSCULAR; INTRAVENOUS ONCE
Status: COMPLETED | OUTPATIENT
Start: 2022-11-22 | End: 2022-11-22

## 2022-11-22 RX ORDER — FAMOTIDINE 20 MG/1
20 TABLET, FILM COATED ORAL 2 TIMES DAILY
Qty: 20 TABLET | Refills: 0 | Status: SHIPPED | OUTPATIENT
Start: 2022-11-22

## 2022-11-22 RX ADMIN — ONDANSETRON 4 MG: 2 INJECTION INTRAMUSCULAR; INTRAVENOUS at 19:21

## 2022-11-22 RX ADMIN — KETOROLAC TROMETHAMINE 15 MG: 15 INJECTION, SOLUTION INTRAMUSCULAR; INTRAVENOUS at 19:21

## 2022-11-22 RX ADMIN — ALUMINUM HYDROXIDE, MAGNESIUM HYDROXIDE, AND DIMETHICONE 30 ML: 200; 20; 200 SUSPENSION ORAL at 19:28

## 2022-11-22 ASSESSMENT — ENCOUNTER SYMPTOMS
ABDOMINAL PAIN: 1
COUGH: 0
SHORTNESS OF BREATH: 0
HEMATURIA: 0
DIARRHEA: 0
NAUSEA: 1
DYSURIA: 0
FEVER: 0
CONSTIPATION: 0
VOMITING: 0

## 2022-11-22 ASSESSMENT — ACTIVITIES OF DAILY LIVING (ADL): ADLS_ACUITY_SCORE: 35

## 2022-11-22 NOTE — ED TRIAGE NOTES
LUQ abd pain that radiates to her back, pain started 1500 yesterday afternoon, pt endorses nausea and some diarrhea. Hx of cholecystectomy and hysterectomy. Hx of pancreatitis, pt advises it feels similar     Triage Assessment     Row Name 11/22/22 6347       Triage Assessment (Adult)    Airway WDL WDL       Respiratory WDL    Respiratory WDL WDL       Skin Circulation/Temperature WDL    Skin Circulation/Temperature WDL WDL       Cardiac WDL    Cardiac WDL WDL       Peripheral/Neurovascular WDL    Peripheral Neurovascular WDL WDL       Cognitive/Neuro/Behavioral WDL    Cognitive/Neuro/Behavioral WDL WDL

## 2022-11-23 NOTE — ED PROVIDER NOTES
History     Chief Complaint:  Abdominal Pain      HPI   Adriane Garrett is a 47 year old female who presents with left upper quadrant abdominal pain that started yesterday afternoon has been constant ever since.  The pain somewhat radiates into her back.  She been nauseated but no vomiting.  She denies any diarrhea or constipation.  She denies any fevers.  She denies any cough or shortness of breath.  Denies any urinary symptoms.  She has had similar pain recurrently in the past.  Has been evaluated and etiology has been unclear.  She has been told that it might be pancreatitis, irritable bowel syndrome, etc.  She has had an endoscopy and multiple CT scans.  She tried taking Tylenol and ibuprofen for pain without relief.    Review of Systems   Constitutional: Negative for fever.   Respiratory: Negative for cough and shortness of breath.    Cardiovascular: Negative for chest pain.   Gastrointestinal: Positive for abdominal pain and nausea. Negative for constipation, diarrhea and vomiting.   Genitourinary: Negative for dysuria and hematuria.   All other systems reviewed and are negative.        Allergies:  Ampicillin  Aspirin  Chantix [Varenicline]  Ciprofloxacin  Penicillins  Tessalon [Benzonatate]    Medications:    albuterol (PROAIR HFA/PROVENTIL HFA/VENTOLIN HFA) 108 (90 Base) MCG/ACT inhaler  albuterol (PROAIR HFA/PROVENTIL HFA/VENTOLIN HFA) 108 (90 Base) MCG/ACT inhaler  diclofenac (VOLTAREN) 1 % topical gel  metoclopramide (REGLAN) 10 MG tablet  ondansetron (ZOFRAN ODT) 4 MG ODT tab  SUMAtriptan (IMITREX) 25 MG tablet         Past Medical History:   Past Surgical History:     Past Medical History:   Diagnosis Date     IBS (irritable bowel syndrome)     Past Surgical History:   Procedure Laterality Date     FOOT SURGERY Right      GI SURGERY      cholecystectomy     GYN SURGERY      hysterectomy and tubal ligation     HC REMOVAL GALLBLADDER      Description: Cholecystectomy;  Recorded: 01/18/2012;      HYSTERECTOMY TOTAL ABDOMINAL, BILATERAL SALPINGO-OOPHORECTOMY, COMBINED      multiple surgeries      Patient Active Problem List    Diagnosis Date Noted     Vitamin D deficiency 04/24/2021     Priority: Medium     Formatting of this note might be different from the original.      Replacement Utility updated for latest IMO load       Other chronic nonalcoholic liver disease 04/24/2021     Priority: Medium     Morbid obesity (H) 04/24/2021     Priority: Medium     Formatting of this note might be different from the original.       Migraine headache 04/24/2021     Priority: Medium     Formatting of this note might be different from the original.  negative mris, negative spinal taps.  Improved with reduction of mountain dew from 12 to 2.     Replacement Utility updated for latest IMO load       Hypertension 04/24/2021     Priority: Medium     Formatting of this note might be different from the original.      Replacement Utility updated for latest IMO load       Hyperlipidemia 04/24/2021     Priority: Medium     Formatting of this note might be different from the original.       Irritable bowel syndrome 06/26/2019     Priority: Medium     Epigastric pain 04/01/2019     Priority: Medium     Pancreatitis 12/12/2018     Priority: Medium     Bipolar 1 disorder (H) 12/26/2014     Priority: Medium     Asthma, mild intermittent 10/13/2010     Priority: Medium     Smoker 06/10/2010     Priority: Medium     GERD (gastroesophageal reflux disease) 01/31/2010     Priority: Medium          Family History  Family History   Problem Relation Age of Onset     Diabetes Mother      Diabetes Sister      Diabetes Nephew      Diabetes Child      Heart Disease Brother         coronary stent at 44     Coronary Stenting Brother      Cerebrovascular Disease Paternal Uncle      Aneurysm Father        Social History:  PCP: No Ref-Primary, Physician  Presents to the ED alone by private vehicle    Physical Exam     Patient Vitals for the past 24  hrs:   BP Temp Temp src Pulse Resp SpO2   11/22/22 1531 (!) 171/103 98.8  F (37.1  C) Oral 55 18 94 %       Physical Exam  Vitals and nursing note reviewed.   Constitutional:       General: She is not in acute distress.     Appearance: She is obese. She is not ill-appearing or toxic-appearing.   HENT:      Head: Normocephalic and atraumatic.      Right Ear: External ear normal.      Left Ear: External ear normal.      Nose: Nose normal.      Mouth/Throat:      Mouth: Mucous membranes are moist.   Eyes:      Extraocular Movements: Extraocular movements intact.      Conjunctiva/sclera: Conjunctivae normal.   Cardiovascular:      Rate and Rhythm: Normal rate and regular rhythm.      Heart sounds: No murmur heard.  Pulmonary:      Effort: Pulmonary effort is normal. No respiratory distress.      Breath sounds: Normal breath sounds. No wheezing, rhonchi or rales.   Abdominal:      General: Abdomen is flat. Bowel sounds are normal. There is no distension.      Palpations: Abdomen is soft.      Tenderness: There is abdominal tenderness (Very mild) in the left upper quadrant. There is no guarding or rebound. Negative signs include Valencia's sign and McBurney's sign.   Musculoskeletal:         General: No deformity or signs of injury.      Cervical back: Normal range of motion and neck supple.   Skin:     General: Skin is warm and dry.      Findings: No rash.   Neurological:      Mental Status: She is alert and oriented to person, place, and time.   Psychiatric:         Behavior: Behavior normal.           Emergency Department Course     Imaging:  No orders to display       Laboratory:  Labs Ordered and Resulted from Time of ED Arrival to Time of ED Departure   ROUTINE UA WITH MICROSCOPIC REFLEX TO CULTURE - Abnormal       Result Value    Color Urine Light Yellow      Appearance Urine Clear      Glucose Urine Negative      Bilirubin Urine Negative      Ketones Urine Negative      Specific Gravity Urine 1.025      Blood Urine  Negative      pH Urine 5.5      Protein Albumin Urine Negative      Urobilinogen Urine Normal      Nitrite Urine Negative      Leukocyte Esterase Urine Negative      Mucus Urine Present (*)     RBC Urine 1      WBC Urine 1      Squamous Epithelials Urine <1      Hyaline Casts Urine 1     BASIC METABOLIC PANEL - Normal    Sodium 142      Potassium 4.2      Chloride 103      Carbon Dioxide (CO2) 27      Anion Gap 12      Urea Nitrogen 9.9      Creatinine 0.58      Calcium 9.4      Glucose 92      GFR Estimate >90     HEPATIC FUNCTION PANEL - Normal    Protein Total 7.2      Albumin 4.5      Bilirubin Total 0.4      Alkaline Phosphatase 95      AST 27      ALT 30      Bilirubin Direct <0.20     LIPASE - Normal    Lipase 18     CBC WITH PLATELETS AND DIFFERENTIAL    WBC Count 6.9      RBC Count 4.66      Hemoglobin 13.7      Hematocrit 43.3      MCV 93      MCH 29.4      MCHC 31.6      RDW 13.5      Platelet Count 370      % Neutrophils 44      % Lymphocytes 48      % Monocytes 6      % Eosinophils 2      % Basophils 0      % Immature Granulocytes 0      NRBCs per 100 WBC 0      Absolute Neutrophils 3.0      Absolute Lymphocytes 3.3      Absolute Monocytes 0.4      Absolute Eosinophils 0.1      Absolute Basophils 0.0      Absolute Immature Granulocytes 0.0      Absolute NRBCs 0.0             Emergency Department Course:      Reviewed:  I reviewed nursing notes, vitals, past history and care everywhere    Interventions:  Medications   ketorolac (TORADOL) injection 15 mg (15 mg Intravenous Given 22)   ondansetron (ZOFRAN) injection 4 mg (4 mg Intravenous Given 22)   lidocaine (viscous) (XYLOCAINE) 2 % 15 mL, alum & mag hydroxide-simethicone (MAALOX) 15 mL GI Cocktail (30 mLs Oral Given 22)       Disposition:  The patient was discharged to home.    Impression & Plan      Medical Decision Makin-year-old female with left upper quadrant abdominal pain.  She has had similar pain in the  past without a clear etiology determined.  Her abdominal exam is benign.  Her labs show no abnormalities including no leukocytosis, normal LFTs, normal lipase, normal UA.  Suspect that this may be irritable bowel syndrome, gas, constipation, gastritis or peptic ulcer disease.  Will try Pepcid and Bentyl for home and recommend close primary care follow-up.  We discussed return precautions.    Diagnosis:    ICD-10-CM    1. LUQ abdominal pain  R10.12           Discharge Medications:  New Prescriptions    DICYCLOMINE (BENTYL) 20 MG TABLET    Take 1 tablet (20 mg) by mouth 2 times daily for 10 days    FAMOTIDINE (PEPCID) 20 MG TABLET    Take 1 tablet (20 mg) by mouth 2 times daily              Truong Worthington MD  11/22/22 1950

## 2022-12-14 ENCOUNTER — HOSPITAL ENCOUNTER (EMERGENCY)
Facility: CLINIC | Age: 47
Discharge: HOME | End: 2022-12-14
Attending: EMERGENCY MEDICINE | Admitting: EMERGENCY MEDICINE

## 2022-12-14 VITALS
SYSTOLIC BLOOD PRESSURE: 190 MMHG | DIASTOLIC BLOOD PRESSURE: 85 MMHG | TEMPERATURE: 97.2 F | RESPIRATION RATE: 22 BRPM | HEART RATE: 54 BPM | OXYGEN SATURATION: 96 %

## 2022-12-14 DIAGNOSIS — I10 ESSENTIAL HYPERTENSION: ICD-10-CM

## 2022-12-14 DIAGNOSIS — R07.9 CHEST PAIN, UNSPECIFIED TYPE: ICD-10-CM

## 2022-12-14 DIAGNOSIS — J20.8 VIRAL BRONCHITIS: ICD-10-CM

## 2022-12-14 LAB
ANION GAP SERPL CALCULATED.3IONS-SCNC: 12 MMOL/L (ref 7–15)
BASOPHILS # BLD AUTO: 0 10E3/UL (ref 0–0.2)
BASOPHILS NFR BLD AUTO: 1 %
BUN SERPL-MCNC: 10.7 MG/DL (ref 6–20)
CALCIUM SERPL-MCNC: 9.3 MG/DL (ref 8.6–10)
CHLORIDE SERPL-SCNC: 103 MMOL/L (ref 98–107)
CREAT SERPL-MCNC: 0.6 MG/DL (ref 0.51–0.95)
DEPRECATED HCO3 PLAS-SCNC: 25 MMOL/L (ref 22–29)
EOSINOPHIL # BLD AUTO: 0.1 10E3/UL (ref 0–0.7)
EOSINOPHIL NFR BLD AUTO: 2 %
ERYTHROCYTE [DISTWIDTH] IN BLOOD BY AUTOMATED COUNT: 13.4 % (ref 10–15)
FLUAV RNA SPEC QL NAA+PROBE: NEGATIVE
FLUBV RNA RESP QL NAA+PROBE: NEGATIVE
GFR SERPL CREATININE-BSD FRML MDRD: >90 ML/MIN/1.73M2
GLUCOSE SERPL-MCNC: 118 MG/DL (ref 70–99)
HCT VFR BLD AUTO: 42 % (ref 35–47)
HGB BLD-MCNC: 13.6 G/DL (ref 11.7–15.7)
IMM GRANULOCYTES # BLD: 0 10E3/UL
IMM GRANULOCYTES NFR BLD: 0 %
LYMPHOCYTES # BLD AUTO: 3.2 10E3/UL (ref 0.8–5.3)
LYMPHOCYTES NFR BLD AUTO: 53 %
MCH RBC QN AUTO: 30.2 PG (ref 26.5–33)
MCHC RBC AUTO-ENTMCNC: 32.4 G/DL (ref 31.5–36.5)
MCV RBC AUTO: 93 FL (ref 78–100)
MONOCYTES # BLD AUTO: 0.4 10E3/UL (ref 0–1.3)
MONOCYTES NFR BLD AUTO: 6 %
NEUTROPHILS # BLD AUTO: 2.2 10E3/UL (ref 1.6–8.3)
NEUTROPHILS NFR BLD AUTO: 38 %
NRBC # BLD AUTO: 0 10E3/UL
NRBC BLD AUTO-RTO: 0 /100
PLATELET # BLD AUTO: 307 10E3/UL (ref 150–450)
POTASSIUM SERPL-SCNC: 3.8 MMOL/L (ref 3.4–5.3)
RBC # BLD AUTO: 4.51 10E6/UL (ref 3.8–5.2)
RSV RNA SPEC NAA+PROBE: NEGATIVE
SARS-COV-2 RNA RESP QL NAA+PROBE: NEGATIVE
SODIUM SERPL-SCNC: 140 MMOL/L (ref 136–145)
TROPONIN T SERPL HS-MCNC: <6 NG/L
WBC # BLD AUTO: 6 10E3/UL (ref 4–11)

## 2022-12-14 PROCEDURE — 84484 ASSAY OF TROPONIN QUANT: CPT | Performed by: EMERGENCY MEDICINE

## 2022-12-14 PROCEDURE — C9803 HOPD COVID-19 SPEC COLLECT: HCPCS

## 2022-12-14 PROCEDURE — 87637 SARSCOV2&INF A&B&RSV AMP PRB: CPT | Performed by: EMERGENCY MEDICINE

## 2022-12-14 PROCEDURE — 36415 COLL VENOUS BLD VENIPUNCTURE: CPT | Performed by: EMERGENCY MEDICINE

## 2022-12-14 PROCEDURE — 85025 COMPLETE CBC W/AUTO DIFF WBC: CPT | Performed by: EMERGENCY MEDICINE

## 2022-12-14 PROCEDURE — 250N000013 HC RX MED GY IP 250 OP 250 PS 637: Performed by: EMERGENCY MEDICINE

## 2022-12-14 PROCEDURE — 93005 ELECTROCARDIOGRAM TRACING: CPT

## 2022-12-14 PROCEDURE — 99284 EMERGENCY DEPT VISIT MOD MDM: CPT | Mod: CS

## 2022-12-14 PROCEDURE — 82310 ASSAY OF CALCIUM: CPT | Performed by: EMERGENCY MEDICINE

## 2022-12-14 RX ORDER — ALBUTEROL SULFATE 90 UG/1
2 AEROSOL, METERED RESPIRATORY (INHALATION) EVERY 6 HOURS PRN
Qty: 18 G | Refills: 0 | Status: SHIPPED | OUTPATIENT
Start: 2022-12-14

## 2022-12-14 RX ORDER — ACETAMINOPHEN 500 MG
1000 TABLET ORAL ONCE
Status: COMPLETED | OUTPATIENT
Start: 2022-12-14 | End: 2022-12-14

## 2022-12-14 RX ADMIN — ACETAMINOPHEN 1000 MG: 500 TABLET ORAL at 16:28

## 2022-12-14 ASSESSMENT — ENCOUNTER SYMPTOMS
FEVER: 0
COUGH: 1
VOMITING: 0
DIARRHEA: 0

## 2022-12-14 ASSESSMENT — ACTIVITIES OF DAILY LIVING (ADL): ADLS_ACUITY_SCORE: 35

## 2022-12-14 NOTE — ED TRIAGE NOTES
Pt reports that she has had a cough and SOB for the last wk, pt reports that the she has had more CP for the last couple of days. PT reports that her chest hurts worse when taking a deep breath. PT VSS and ABC's intact

## 2022-12-14 NOTE — ED PROVIDER NOTES
"  History   Chief Complaint:  Cough and Chest Pain       The history is provided by the patient.      Adriane Garrett is a 47 year old female with history of hypertension, hyperlipidemia, and mild intermittent asthma who presents with cough and chest pain. The patient reports experiencing a cough with mild expectorant for the last week. She states that yesterday she developed sub-sternal chest pain that is worsened with coughing and deep breathing. She describes the pain as feeling \"like someone is sitting on my chest.\"  The pain has been constant for the last day with no pain-free episodes. She denies feeling febrile, vomiting, diarrhea, or any rashes. She took Dayquil today at noon.    Review of Systems   Constitutional: Negative for fever.   Respiratory: Positive for cough.    Cardiovascular: Positive for chest pain.   Gastrointestinal: Negative for diarrhea and vomiting.   Skin: Negative for rash.   All other systems reviewed and are negative.    Allergies:  Ampicillin  Aspirin  Chantix [Varenicline]  Ciprofloxacin  Penicillins  Tessalon [Benzonatate]    Medications:  Albuterol inhaler  Imitrex  Zofran ODT  Reglan  Pepcid    Past Medical History:     Vitamin D deficiency  Smoker  Pancreatitis  Chronic nonalcoholic liver disease  Morbid obesity  Migraine headache  Irritable bowel syndrome  Hypertension  Hyperlipidemia  GERD  Epigastric pain  Bipolar 1 disorder  Asthma, mild intermittent     Past Surgical History:    Foot surgery, right  Cholecystectomy  Hysterectomy and tubal ligation  Myringotomy, bilateral  EGD with biopsy     Family History:    Mother: Diabetes  Father: Aneurysm  Sister: Diabetes  Brother: Coronary stenting    Social History:  The patient presents to the ED alone. She arrived via private vehicle.    Physical Exam     Patient Vitals for the past 24 hrs:   BP Temp Temp src Pulse Resp SpO2   12/14/22 1500 (!) 190/85 97.2  F (36.2  C) Temporal 54 22 96 %       Physical Exam      Eyes: "    Conjunctiva normal  Neck:    Supple, no meningismus.     CV:     Regular rate and rhythm.      No murmurs, rubs or gallops.       No unilateral leg swelling.       2+ radial pulses bilateral.       No lower extremity edema.  PULM:    Clear to auscultation bilateral.       No respiratory distress.      Good air exchange.     No rales or wheezing.     Chest wall is tender over the sternum which reproduces the patient's pain  ABD:    Soft, non-tender, non-distended.       No pulsatile masses.       No rebound, guarding or rigidity.  MSK:     No gross deformity to all four extremities.   LYMPH:   No cervical lymphadenopathy.  NEURO:   Alert. Good muscle tone, no atrophy.  Skin:    Warm, dry and intact.    Psych:    Mood is good and affect is appropriate.        Emergency Department Course   ECG  ECG taken at 1510, ECG read at 1511  Sinus bradycardia.   No significant changes as compared to prior, dated 9/2/22.  Rate 52 bpm. LA interval 174 ms. QRS duration 104 ms. QT/QTc 430/399 ms. P-R-T axes 58 3 42.     Laboratory:  Labs Ordered and Resulted from Time of ED Arrival to Time of ED Departure   BASIC METABOLIC PANEL - Abnormal       Result Value    Sodium 140      Potassium 3.8      Chloride 103      Carbon Dioxide (CO2) 25      Anion Gap 12      Urea Nitrogen 10.7      Creatinine 0.60      Calcium 9.3      Glucose 118 (*)     GFR Estimate >90     INFLUENZA A/B & SARS-COV2 PCR MULTIPLEX - Normal    Influenza A PCR Negative      Influenza B PCR Negative      RSV PCR Negative      SARS CoV2 PCR Negative     TROPONIN T, HIGH SENSITIVITY - Normal    Troponin T, High Sensitivity <6     CBC WITH PLATELETS AND DIFFERENTIAL    WBC Count 6.0      RBC Count 4.51      Hemoglobin 13.6      Hematocrit 42.0      MCV 93      MCH 30.2      MCHC 32.4      RDW 13.4      Platelet Count 307      % Neutrophils 38      % Lymphocytes 53      % Monocytes 6      % Eosinophils 2      % Basophils 1      % Immature Granulocytes 0      NRBCs per  100 WBC 0      Absolute Neutrophils 2.2      Absolute Lymphocytes 3.2      Absolute Monocytes 0.4      Absolute Eosinophils 0.1      Absolute Basophils 0.0      Absolute Immature Granulocytes 0.0      Absolute NRBCs 0.0        Emergency Department Course:       Reviewed:  I reviewed nursing notes, vitals, past medical history and Care Everywhere    Assessments:  161 I obtained history and examined the patient as noted above.    I rechecked the patient and explained findings.     Interventions:  1628 Tylenol, 1000 mg, PO.    Disposition:  The patient was discharged to home.     Impression & Plan     Medical Decision Makin-year-old female presents to the ED with cough and secondary chest pain.  Very low suspicion for ACS.  EKG without ischemic changes.  Troponin within normal limits despite greater than 12 hours of constant symptoms thus ruling out MI.  She has reproducible chest wall tenderness indicating likely costochondritis or muscle strain related to coughing.  She has no focal findings to suggest pneumonia.  Very low suspicion for pulmonary embolism.  In accordance with patient's ED care plan, we will avoid unnecessary radiographic imaging including chest x-ray or CT scan as evaluation is not consistent with occult sinister pathology including pneumothorax, pneumonia, pulmonary embolism, aortic pathology.  Evaluation most consistent with viral bronchitis.  Supportive measures indicated.    Diagnosis:    ICD-10-CM    1. Viral bronchitis  J20.8       2. Chest pain, unspecified type  R07.9       3. Essential hypertension  I10           Discharge Medications:  Discharge Medication List as of 2022  5:45 PM          Scribe Disclosure:  I, Nikki Juanjo, am serving as a scribe at 5:39 PM on 2022 to document services personally performed by French Harmon MD based on my observations and the provider's statements to me.        French Harmon MD  [phone]

## 2022-12-14 NOTE — DISCHARGE INSTRUCTIONS
Please make an appointment to follow up with your primary care provider in 5-7 days even if entirely better for blood pressure recheck.    Discharge Instructions  Bronchitis, Pneumonia, Bronchospasm    You were seen today for a chest infection or inflammation. If your provider decided this was due to a bacterial infection, you may need an antibiotic. Sometimes these are caused by a virus, and then an antibiotic will not help.     Generally, every Emergency Department visit should have a follow-up clinic visit with either a primary or a specialty clinic/provider. Please follow-up as instructed by your emergency provider today.    Return to the Emergency Department if:  Your breathing gets much worse.  You are very weak, or feel much more ill.  You develop new symptoms, such as chest pain.  You cough up blood.  You are vomiting (throwing up) enough that you cannot keep fluids or your medicine down.    What can I do to help myself?  Fill any prescriptions the provider gave you and take them right away--especially antibiotics. Be sure to finish the whole antibiotic prescription.  You may be given a prescription for an inhaler, which can help loosen tight air passages.  Use this as needed, but not more often than directed. Inhalers work much better when used with a spacer.   You may be given a prescription for a steroid to reduce inflammation. Used long-term, these can have side effects, but for short-term use they are safe. You may notice restlessness or increased appetite.      You may use non-prescription cough or cold medicines. Cough medicines may help, but don t make the cough go away completely.   Avoid smoke, because this can make your symptoms worse. If you smoke, this may be a good time to quit! Consider using nicotine lozenges, gum, or patches to reduce cravings.   If you have a fever, Tylenol  (acetaminophen), Motrin  (ibuprofen), or Advil  (ibuprofen) may help bring fever down and may help you feel more  comfortable. Be sure to read and follow the package directions, and ask your provider if you have questions.  Be sure to get your flu shot each year.  For certain ages, the pneumonia shot can help prevent pneumonia.  If you were given a prescription for medicine here today, be sure to read all of the information (including the package insert) that comes with your prescription.  This will include important information about the medicine, its side effects, and any warnings that you need to know about.  The pharmacist who fills the prescription can provide more information and answer questions you may have about the medicine.  If you have questions or concerns that the pharmacist cannot address, please call or return to the Emergency Department.     Remember that you can always come back to the Emergency Department if you are not able to see your regular provider in the amount of time listed above, if you get any new symptoms, or if there is anything that worries you.

## 2022-12-15 LAB
ATRIAL RATE - MUSE: 52 BPM
DIASTOLIC BLOOD PRESSURE - MUSE: NORMAL MMHG
INTERPRETATION ECG - MUSE: NORMAL
P AXIS - MUSE: 58 DEGREES
PR INTERVAL - MUSE: 174 MS
QRS DURATION - MUSE: 104 MS
QT - MUSE: 430 MS
QTC - MUSE: 399 MS
R AXIS - MUSE: 3 DEGREES
SYSTOLIC BLOOD PRESSURE - MUSE: NORMAL MMHG
T AXIS - MUSE: 42 DEGREES
VENTRICULAR RATE- MUSE: 52 BPM

## 2023-01-01 NOTE — DISCHARGE INSTRUCTIONS
Baby had temp drop to 97.2. warmed on warmer for 25 mins. Brought up to 98.1  Problem: Temperature Instability ()  Goal: Temperature Stability       Problem: Infant Inpatient Plan of Care  Goal: Plan of Care Review  Outcome: Ongoing, Progressing  Goal: Patient-Specific Goal (Individualized)  Outcome: Ongoing, Progressing  Goal: Absence of Hospital-Acquired Illness or Injury  Outcome: Ongoing, Progressing  Goal: Optimal Comfort and Wellbeing  Outcome: Ongoing, Progressing  Goal: Readiness for Transition of Care  Outcome: Ongoing, Progressing     Problem: Hypoglycemia ([place])  Goal: Glucose Stability  Outcome: Ongoing, Progressing     Problem: Infection ([place])  Goal: Absence of Infection Signs and Symptoms  Outcome: Ongoing, Progressing     Problem: Oral Nutrition ()  Goal: Effective Oral Intake  Outcome: Ongoing, Progressing     Problem: Infant-Parent Attachment ()  Goal: Demonstration of Attachment Behaviors  Outcome: Ongoing, Progressing     Problem: Pain ([place])  Goal: Acceptable Level of Comfort and Activity  Outcome: Ongoing, Progressing     Problem: Respiratory Compromise ([place])  Goal: Effective Oxygenation and Ventilation  Outcome: Ongoing, Progressing     Problem: Skin Injury ()  Goal: Skin Health and Integrity  Outcome: Ongoing, Progressing      Discharge Instructions  Abdominal Pain    Abdominal pain (belly pain) can be caused by many things. Your evaluation today does not show the exact cause for your pain. Your provider today has decided that it is unlikely your pain is due to a life threatening problem, or a problem requiring surgery or hospital admission. Sometimes those problems cannot be found right away, so it is very important that you follow up as directed.  Sometimes only the changes which occur over time allow the cause of your pain to be found.    Generally, every Emergency Department visit should have a follow-up clinic visit with either a primary or a specialty clinic/provider. Please follow-up as instructed by your emergency provider today. With abdominal pain, we often recommend very close follow-up, such as the following day.    ADULTS:  Return to the Emergency Department right away if:    You get an oral temperature above 102oF or as directed by your provider.  You have blood in your stools. This may be bright red or appear as black, tarry stools.    You keep vomiting (throwing up) or cannot drink liquids.  You see blood when you vomit.   You cannot have a bowel movement or you cannot pass gas.  Your stomach gets bloated or bigger.  Your skin or the whites of your eyes look yellow.  You faint.  You have bloody, frequent or painful urination (peeing).  You have new symptoms or anything that worries you.    CHILDREN:  Return to the Emergency Department right away if your child has any of the above-listed symptoms or the following:    Pushes your hand away or screams/cries when his/her belly is touched.  You notice your child is very fussy or weak.  Your child is very tired and is too tired to eat or drink.  Your child is dehydrated.  Signs of dehydration can be:  Significant change in the amount of wet diapers/urine.  Your infant or child starts to have dry mouth and lips, or no saliva (spit) or tears.    PREGNANT WOMEN:  Return to the  Emergency Department right away if you have any of the above-listed symptoms or the following:    You have bleeding, leaking fluid or passing tissue from the vagina.  You have worse pain or cramping, or pain in your shoulder or back.  You have vomiting that will not stop.  You have a temperature of 100oF or more.  Your baby is not moving as much as usual.  You faint.  You get a bad headache with or without eye problems and abdominal pain.  You have a seizure.  You have unusual discharge from your vagina and abdominal pain.    Abdominal pain is pretty common during pregnancy.  Your pain may or may not be related to your pregnancy. You should follow-up closely with your OB provider so they can evaluate you and your baby.  Until you follow-up with your regular provider, do the following:     Avoid sex and do not put anything in your vagina.  Drink clear fluids.  Only take medications approved by your provider.    MORE INFORMATION:    Appendicitis:  A possible cause of abdominal pain in any person who still has their appendix is acute appendicitis. Appendicitis is often hard to diagnose.  Testing does not always rule out early appendicitis or other causes of abdominal pain. Close follow-up with your provider and re-evaluations may be needed to figure out the reason for your abdominal pain.    Follow-up:  It is very important that you make an appointment with your clinic and go to the appointment.  If you do not follow-up with your primary provider, it may result in missing an important development which could result in permanent injury or disability and/or lasting pain.  If there is any problem keeping your appointment, call your provider or return to the Emergency Department.    Medications:  Take your medications as directed by your provider today.  Before using over-the-counter medications, ask your provider and make sure to take the medications as directed.  If you have any questions about medications, ask your  "provider.    Diet:  Resume your normal diet as much as possible, but do not eat fried, fatty or spicy foods while you have pain.  Do not drink alcohol or have caffeine.  Do not smoke tobacco.    Probiotics: If you have been given an antibiotic, you may want to also take a probiotic pill or eat yogurt with live cultures. Probiotics have \"good bacteria\" to help your intestines stay healthy. Studies have shown that probiotics help prevent diarrhea (loose stools) and other intestine problems (including C. diff infection) when you take antibiotics. You can buy these without a prescription in the pharmacy section of the store.     If you were given a prescription for medicine here today, be sure to read all of the information (including the package insert) that comes with your prescription.  This will include important information about the medicine, its side effects, and any warnings that you need to know about.  The pharmacist who fills the prescription can provide more information and answer questions you may have about the medicine.  If you have questions or concerns that the pharmacist cannot address, please call or return to the Emergency Department.       Remember that you can always come back to the Emergency Department if you are not able to see your regular provider in the amount of time listed above, if you get any new symptoms, or if there is anything that worries you.   "

## 2023-01-14 ENCOUNTER — HEALTH MAINTENANCE LETTER (OUTPATIENT)
Age: 48
End: 2023-01-14

## 2023-01-18 ENCOUNTER — HOSPITAL ENCOUNTER (EMERGENCY)
Facility: CLINIC | Age: 48
Discharge: HOME | End: 2023-01-18
Attending: EMERGENCY MEDICINE | Admitting: EMERGENCY MEDICINE

## 2023-01-18 VITALS
OXYGEN SATURATION: 99 % | SYSTOLIC BLOOD PRESSURE: 177 MMHG | HEART RATE: 64 BPM | DIASTOLIC BLOOD PRESSURE: 92 MMHG | RESPIRATION RATE: 16 BRPM | TEMPERATURE: 98.8 F

## 2023-01-18 DIAGNOSIS — G89.29 CHRONIC ABDOMINAL PAIN: ICD-10-CM

## 2023-01-18 DIAGNOSIS — R10.9 CHRONIC ABDOMINAL PAIN: ICD-10-CM

## 2023-01-18 LAB
ALBUMIN SERPL BCG-MCNC: 4.5 G/DL (ref 3.5–5.2)
ALBUMIN UR-MCNC: 20 MG/DL
ALP SERPL-CCNC: 94 U/L (ref 35–104)
ALT SERPL W P-5'-P-CCNC: 47 U/L (ref 10–35)
ANION GAP SERPL CALCULATED.3IONS-SCNC: 13 MMOL/L (ref 7–15)
APPEARANCE UR: CLEAR
AST SERPL W P-5'-P-CCNC: 47 U/L (ref 10–35)
BACTERIA #/AREA URNS HPF: ABNORMAL /HPF
BASOPHILS # BLD AUTO: 0 10E3/UL (ref 0–0.2)
BASOPHILS NFR BLD AUTO: 1 %
BILIRUB DIRECT SERPL-MCNC: <0.2 MG/DL (ref 0–0.3)
BILIRUB SERPL-MCNC: 0.5 MG/DL
BILIRUB UR QL STRIP: NEGATIVE
BUN SERPL-MCNC: 10.4 MG/DL (ref 6–20)
CALCIUM SERPL-MCNC: 9.5 MG/DL (ref 8.6–10)
CHLORIDE SERPL-SCNC: 100 MMOL/L (ref 98–107)
COLOR UR AUTO: YELLOW
CREAT SERPL-MCNC: 0.67 MG/DL (ref 0.51–0.95)
DEPRECATED HCO3 PLAS-SCNC: 25 MMOL/L (ref 22–29)
EOSINOPHIL # BLD AUTO: 0.1 10E3/UL (ref 0–0.7)
EOSINOPHIL NFR BLD AUTO: 2 %
ERYTHROCYTE [DISTWIDTH] IN BLOOD BY AUTOMATED COUNT: 13.1 % (ref 10–15)
GFR SERPL CREATININE-BSD FRML MDRD: >90 ML/MIN/1.73M2
GLUCOSE SERPL-MCNC: 116 MG/DL (ref 70–99)
GLUCOSE UR STRIP-MCNC: 30 MG/DL
HCT VFR BLD AUTO: 42.8 % (ref 35–47)
HGB BLD-MCNC: 13.5 G/DL (ref 11.7–15.7)
HGB UR QL STRIP: NEGATIVE
HOLD SPECIMEN: NORMAL
HOLD SPECIMEN: NORMAL
IMM GRANULOCYTES # BLD: 0 10E3/UL
IMM GRANULOCYTES NFR BLD: 0 %
KETONES UR STRIP-MCNC: ABNORMAL MG/DL
LEUKOCYTE ESTERASE UR QL STRIP: NEGATIVE
LIPASE SERPL-CCNC: 17 U/L (ref 13–60)
LYMPHOCYTES # BLD AUTO: 2.8 10E3/UL (ref 0.8–5.3)
LYMPHOCYTES NFR BLD AUTO: 44 %
MCH RBC QN AUTO: 29.8 PG (ref 26.5–33)
MCHC RBC AUTO-ENTMCNC: 31.5 G/DL (ref 31.5–36.5)
MCV RBC AUTO: 95 FL (ref 78–100)
MONOCYTES # BLD AUTO: 0.4 10E3/UL (ref 0–1.3)
MONOCYTES NFR BLD AUTO: 6 %
MUCOUS THREADS #/AREA URNS LPF: PRESENT /LPF
NEUTROPHILS # BLD AUTO: 3 10E3/UL (ref 1.6–8.3)
NEUTROPHILS NFR BLD AUTO: 47 %
NITRATE UR QL: NEGATIVE
NRBC # BLD AUTO: 0 10E3/UL
NRBC BLD AUTO-RTO: 0 /100
PH UR STRIP: 5.5 [PH] (ref 5–7)
PLATELET # BLD AUTO: 356 10E3/UL (ref 150–450)
POTASSIUM SERPL-SCNC: 3.8 MMOL/L (ref 3.4–5.3)
PROT SERPL-MCNC: 7.6 G/DL (ref 6.4–8.3)
RBC # BLD AUTO: 4.53 10E6/UL (ref 3.8–5.2)
RBC URINE: 1 /HPF
SODIUM SERPL-SCNC: 138 MMOL/L (ref 136–145)
SP GR UR STRIP: 1.03 (ref 1–1.03)
SQUAMOUS EPITHELIAL: 6 /HPF
UROBILINOGEN UR STRIP-MCNC: NORMAL MG/DL
WBC # BLD AUTO: 6.3 10E3/UL (ref 4–11)
WBC URINE: 1 /HPF

## 2023-01-18 PROCEDURE — 250N000013 HC RX MED GY IP 250 OP 250 PS 637: Performed by: EMERGENCY MEDICINE

## 2023-01-18 PROCEDURE — 36415 COLL VENOUS BLD VENIPUNCTURE: CPT | Performed by: EMERGENCY MEDICINE

## 2023-01-18 PROCEDURE — 250N000009 HC RX 250: Performed by: EMERGENCY MEDICINE

## 2023-01-18 PROCEDURE — 99283 EMERGENCY DEPT VISIT LOW MDM: CPT

## 2023-01-18 PROCEDURE — 80048 BASIC METABOLIC PNL TOTAL CA: CPT | Performed by: EMERGENCY MEDICINE

## 2023-01-18 PROCEDURE — 85025 COMPLETE CBC W/AUTO DIFF WBC: CPT | Performed by: EMERGENCY MEDICINE

## 2023-01-18 PROCEDURE — 83690 ASSAY OF LIPASE: CPT | Performed by: EMERGENCY MEDICINE

## 2023-01-18 PROCEDURE — 85004 AUTOMATED DIFF WBC COUNT: CPT | Performed by: EMERGENCY MEDICINE

## 2023-01-18 PROCEDURE — 81001 URINALYSIS AUTO W/SCOPE: CPT | Performed by: EMERGENCY MEDICINE

## 2023-01-18 PROCEDURE — 80053 COMPREHEN METABOLIC PANEL: CPT | Performed by: EMERGENCY MEDICINE

## 2023-01-18 PROCEDURE — 82248 BILIRUBIN DIRECT: CPT | Performed by: EMERGENCY MEDICINE

## 2023-01-18 PROCEDURE — 250N000011 HC RX IP 250 OP 636: Performed by: EMERGENCY MEDICINE

## 2023-01-18 RX ORDER — ONDANSETRON 4 MG/1
4 TABLET, ORALLY DISINTEGRATING ORAL ONCE
Status: COMPLETED | OUTPATIENT
Start: 2023-01-18 | End: 2023-01-18

## 2023-01-18 RX ADMIN — ALUMINUM HYDROXIDE, MAGNESIUM HYDROXIDE, AND DIMETHICONE 30 ML: 200; 20; 200 SUSPENSION ORAL at 18:27

## 2023-01-18 RX ADMIN — ONDANSETRON 4 MG: 4 TABLET, ORALLY DISINTEGRATING ORAL at 18:27

## 2023-01-18 ASSESSMENT — ENCOUNTER SYMPTOMS
CONSTIPATION: 0
NAUSEA: 1
DIARRHEA: 0
BLOOD IN STOOL: 0
DYSURIA: 0
FREQUENCY: 1

## 2023-01-18 ASSESSMENT — ACTIVITIES OF DAILY LIVING (ADL)
ADLS_ACUITY_SCORE: 33
ADLS_ACUITY_SCORE: 33

## 2023-01-18 NOTE — ED PROVIDER NOTES
History     Chief Complaint:  Abdominal Pain       HPI   Adriane Garrett is a 47 year old female who presents with left upper abdominal pain. Pains started yesterday afternoon and has been constant in nature. Patient describes is as deep stabbing in nature. Has been taking naproxen and tylenol with little relief. Pain is worsened with eating. Normal BMs.   Patient also endorses more frequent urination with no pain. Denies suprapubic pain.   Patient has extensive history of upper abdominal pain visits in past. Care plan in place.    ROS:  Review of Systems   Gastrointestinal: Positive for nausea. Negative for blood in stool, constipation and diarrhea.   Genitourinary: Positive for frequency. Negative for dysuria.       Allergies:  Ampicillin  Aspirin  Chantix [Varenicline]  Ciprofloxacin  Penicillins  Tessalon [Benzonatate]     Medications:    albuterol (PROAIR HFA/PROVENTIL HFA/VENTOLIN HFA) 108 (90 Base) MCG/ACT inhaler  diclofenac (VOLTAREN) 1 % topical gel  famotidine (PEPCID) 20 MG tablet  metoclopramide (REGLAN) 10 MG tablet  ondansetron (ZOFRAN ODT) 4 MG ODT tab  SUMAtriptan (IMITREX) 25 MG tablet        Past Medical History:    Past Medical History:   Diagnosis Date     IBS (irritable bowel syndrome)        Past Surgical History:    Past Surgical History:   Procedure Laterality Date     FOOT SURGERY Right      GI SURGERY      cholecystectomy     GYN SURGERY      hysterectomy and tubal ligation     HC REMOVAL GALLBLADDER      Description: Cholecystectomy;  Recorded: 01/18/2012;     HYSTERECTOMY TOTAL ABDOMINAL, BILATERAL SALPINGO-OOPHORECTOMY, COMBINED      multiple surgeries        Family History:    family history includes Aneurysm in her father; Cerebrovascular Disease in her paternal uncle; Coronary Stenting in her brother; Diabetes in her child, mother, nephew, and sister; Heart Disease in her brother.    Social History:   reports that she has been smoking. She uses smokeless tobacco. She reports  current alcohol use. She reports that she does not currently use drugs.  PCP: No Ref-Primary, Physician     Physical Exam     Patient Vitals for the past 24 hrs:   BP Temp Temp src Pulse Resp SpO2   01/18/23 1536 (!) 177/92 98.8  F (37.1  C) Temporal 64 16 99 %        Physical Exam  Constitutional:       Appearance: She is obese.   Cardiovascular:      Heart sounds: Normal heart sounds.   Pulmonary:      Breath sounds: Normal breath sounds.   Abdominal:      General: Bowel sounds are normal.      Palpations: Abdomen is soft.      Tenderness: There is abdominal tenderness in the epigastric area and left upper quadrant.   Neurological:      Mental Status: She is oriented to person, place, and time.         Emergency Department Course   ECG:  ECG results from 12/14/22   EKG 12-lead, tracing only     Value    Systolic Blood Pressure     Diastolic Blood Pressure     Ventricular Rate 52    Atrial Rate 52    CT Interval 174    QRS Duration 104        QTc 399    P Axis 58    R AXIS 3    T Axis 42    Interpretation ECG      Sinus bradycardia  Otherwise normal ECG  When compared with ECG of 12-SEP-2022 16:38,  No significant change was found  Confirmed by - EMERGENCY ROOM, PHYSICIAN ([id number]),  WANDA MARIANO ([id number]) on 12/15/2022 6:47:47 AM         Imaging:  No orders to display           Laboratory:  Labs Ordered and Resulted from Time of ED Arrival to Time of ED Departure   BASIC METABOLIC PANEL - Abnormal       Result Value    Sodium 138      Potassium 3.8      Chloride 100      Carbon Dioxide (CO2) 25      Anion Gap 13      Urea Nitrogen 10.4      Creatinine 0.67      Calcium 9.5      Glucose 116 (*)     GFR Estimate >90     ROUTINE UA WITH MICROSCOPIC REFLEX TO CULTURE - Abnormal    Color Urine Yellow      Appearance Urine Clear      Glucose Urine 30 (*)     Bilirubin Urine Negative      Ketones Urine Trace (*)     Specific Gravity Urine 1.031      Blood Urine Negative      pH Urine 5.5      Protein Albumin  Urine 20 (*)     Urobilinogen Urine Normal      Nitrite Urine Negative      Leukocyte Esterase Urine Negative      Bacteria Urine Few (*)     Mucus Urine Present (*)     RBC Urine 1      WBC Urine 1      Squamous Epithelials Urine 6 (*)    HEPATIC FUNCTION PANEL - Abnormal    Protein Total 7.6      Albumin 4.5      Bilirubin Total 0.5      Alkaline Phosphatase 94      AST 47 (*)     ALT 47 (*)     Bilirubin Direct <0.20     LIPASE - Normal    Lipase 17     CBC WITH PLATELETS AND DIFFERENTIAL    WBC Count 6.3      RBC Count 4.53      Hemoglobin 13.5      Hematocrit 42.8      MCV 95      MCH 29.8      MCHC 31.5      RDW 13.1      Platelet Count 356      % Neutrophils 47      % Lymphocytes 44      % Monocytes 6      % Eosinophils 2      % Basophils 1      % Immature Granulocytes 0      NRBCs per 100 WBC 0      Absolute Neutrophils 3.0      Absolute Lymphocytes 2.8      Absolute Monocytes 0.4      Absolute Eosinophils 0.1      Absolute Basophils 0.0      Absolute Immature Granulocytes 0.0      Absolute NRBCs 0.0            Interventions:  Medications   lidocaine (viscous) (XYLOCAINE) 2 % 15 mL, alum & mag hydroxide-simethicone (MAALOX) 15 mL GI Cocktail (30 mLs Oral Given 1/18/23 1827)   ondansetron (ZOFRAN ODT) ODT tab 4 mg (4 mg Oral Given 1/18/23 1827)       Disposition:  The patient was discharged to home.     Impression & Plan      Medical Decision Making:  Patient is a pleasant 47 year old woman presenting for LUQ and epigastric pain. Patient has history of pancreatitis but her pancreatic enzymes are normal today. Hepatic panel showed mildly elevated AST/ALT, to be expected with her history of hepatic steatosis. Patient has care plan in place due to her numerous visits for similar complaints for her chronic abdominal pain. GI cocktail, zofran, and oral challenge here in ED. Nausea subsided but LUQ pain remained. Care plan was followed and through shared decision making, CT deemed unnecessary today. Informed  patient to follow up with her primary provider within 1 week. Return to ED should symptoms worsen.    Diagnosis:  No diagnosis found.     Discharge Medications:  New Prescriptions    No medications on file        January 18, 2023  SHAVONNE Hernandes Lauren R, PA-C  01/18/23 2260

## 2023-01-18 NOTE — ED PROVIDER NOTES
Emergency Department Attending Supervision Note  1/18/2023  5:59 PM      I evaluated this patient in conjunction with Michelle Chirinos PA-C      Briefly, the patient who presents with left upper abdominal pain. Pains started yesterday afternoon and has been constant in nature. Patient describes is as deep stabbing in nature. Has been taking naproxen and tylenol with little relief. Pain is worsened with eating. Normal BMs. Patient also endorses more frequent urination with no pain. Denies suprapubic pain. Patient has extensive history of upper abdominal pain visits in past. Care plan in place.     On my exam,   Vitals: reviewed by me  General: Pt seen on hospital [facility], pleasant, cooperative, and alert to conversation  Eyes: Tracking well, clear conjunctiva BL  ENT: MMM, midline trachea.   Lungs: No tachypnea, no accessory muscle use. No respiratory distress.   CV: Rate as above  Abd: Soft, midepigastric and left upper quadrant tenderness to palpation, no guarding, no rebound however.  This exam was repeated at time of discharge and was unchanged.  I am able to rock her abdomen back and forth from left to right without any grimacing or pain however.  MSK: no joint effusion.  No evidence of trauma  Skin: No rash  Neuro: Clear speech and no facial droop.  Psych: Not RIS, no e/o AH/VH      Results:    ED course:    My impression is that this is a pleasant 47-year-old female who presents to the emergency room with abdominal pain.  She does have a benign abdomen, with some areas of tenderness, but she states that this is similar to previous episodes of pain that she has had.  She is relieved that her lipase is normal.  She does have a care plan for chronic abdominal pain, and does tell me that this feels like her typical abdominal pain.  Because her abdomen is benign and because of her care plan and her vital signs and lab work-up being with IR, as a team we decided against doing a CT scan which I do think is in the  patient's best interest given a low pretest probability for actionable pathology at this time.  There may be some element of epigastric discomfort from gastritis, although the GI cocktail did not have any effect.  Patient thinks that this is possibly chronic pancreatitis but denies any recent alcohol use.  I do think that she stable for discharge home and she knows that no clear cause of her pain was found today and that she needs to contact the ER immediately if anything changes or if she cannot manage her pain and symptoms at home.  Red flags for when to come back to the ER discussed in detail        Diagnosis    ICD-10-CM    1. Chronic abdominal pain  R10.9     G89.29           William Green MD Fitzgerald, Michael Maxwell Kreofsky, MD  01/18/23 2345

## 2023-01-18 NOTE — ED TRIAGE NOTES
left upper abdominal pain that started two days ago. Hx of pancreatitis. Patient also reports urinary frequency.

## 2023-01-19 NOTE — ED NOTES
Pt given medications still c/o nausea and pain. Pt able to eat and drink ok. Pt sitting in bed calm, playing on phone when RN in room.

## 2023-02-16 ENCOUNTER — APPOINTMENT (OUTPATIENT)
Dept: GENERAL RADIOLOGY | Facility: CLINIC | Age: 48
End: 2023-02-16
Attending: EMERGENCY MEDICINE

## 2023-02-16 ENCOUNTER — HOSPITAL ENCOUNTER (EMERGENCY)
Facility: CLINIC | Age: 48
Discharge: HOME | End: 2023-02-16
Attending: EMERGENCY MEDICINE | Admitting: EMERGENCY MEDICINE

## 2023-02-16 VITALS
OXYGEN SATURATION: 100 % | DIASTOLIC BLOOD PRESSURE: 85 MMHG | RESPIRATION RATE: 18 BRPM | HEART RATE: 50 BPM | SYSTOLIC BLOOD PRESSURE: 207 MMHG | TEMPERATURE: 98.5 F

## 2023-02-16 DIAGNOSIS — S47.1XXA: ICD-10-CM

## 2023-02-16 PROCEDURE — 73060 X-RAY EXAM OF HUMERUS: CPT | Mod: RT

## 2023-02-16 PROCEDURE — 99284 EMERGENCY DEPT VISIT MOD MDM: CPT

## 2023-02-16 PROCEDURE — 99283 EMERGENCY DEPT VISIT LOW MDM: CPT

## 2023-02-16 RX ORDER — CYCLOBENZAPRINE HCL 10 MG
10 TABLET ORAL 3 TIMES DAILY PRN
Qty: 10 TABLET | Refills: 0 | Status: SHIPPED | OUTPATIENT
Start: 2023-02-16 | End: 2023-07-03

## 2023-02-16 ASSESSMENT — ENCOUNTER SYMPTOMS: ARTHRALGIAS: 1

## 2023-02-16 NOTE — ED PROVIDER NOTES
History     Chief Complaint:  Arm Pain     The history is provided by the patient.      Adriane Garrett is a 47 year old female with a history of morbid obesity and hypertension who presents with persisting right arm pain that began after a mechanical fall 2 days ago. The patient reports that she was walking outside with her  when she fell, landing hard on her right side. She is unsure of what exactly caused her fall, but she suspects that her ankle gave out as she has a history of ankle issues. She did not hear anything pop but has had pain to her mid upper arm since and decided to present to the ED for evaluation. Presently, she endorses also having some right shoulder pain with movement and notes sustaining a fracture in her humerus when she was a child.    Independent Historian:   None - Patient Only    Review of External Notes: reviewed outside ED visit from 5/14/22    ROS:  Review of Systems   Musculoskeletal: Positive for arthralgias.   All other systems reviewed and are negative.    Allergies:  Ampicillin  Aspirin  Chantix [Varenicline]  Ciprofloxacin  Penicillins  Tessalon [Benzonatate]     Medications:    Flexeril  Albuterol inhaler  Pepcid  Reglan  Imitrex    Past Medical History:    IBS  GERD  Asthma  Chronic abdominal pain   Depression  Bipolar disorder  Ovarian cyst  Hypertension   Darier disease  Morbid obesity  Hyperlipidemia  Non alcoholic fatty liver disease  Community acquired pneumonia  Tobacco use disorder  Narcotic dependence  Neuralgia of abdomen  Oral candidiasis  Chronic bronchitis  Dysplastic nevus  Postcholecystectomy syndrome  Sialoadenitis  Chronic back pain  Hypomagnesemia  Herpes zoster  Nicotine dependence   COPD    Past Surgical History:    Right foot surgery  Cholecystectomy  LEBRON and BSO  Subtalar arthroereisis  Diagnostic abdomen laparoscopy   Myringotomy w/ tube placement, bilateral  EGD    Family History:    Aneurysm  Coronary stenting  Diabetes mellitus x3  Heart  diease  Stroke  Asthma x3    Social History:  The patient presents to the ED alone.  The patient arrived to the ED in a private vehicle.   The patient has a history of homelessness.    Physical Exam     Patient Vitals for the past 24 hrs:   BP Temp Temp src Pulse Resp SpO2   02/16/23 1155 (!) 207/85 98.5  F (36.9  C) Temporal 50 18 100 %      Physical Exam  Constitutional:  Alert, attentive, GCS 15  Cardiovascular:  2+ radial and ulnar pulses to the bilateral upper extremities   Neurological:  5/5 strength to the radian, ulnar and median motor distributions;      sensation intact to light touch to the radian, ulnar and median distributions  MSK:   Normal inspection to the right upper arm, tender to the right mid humerus.  No pain out of portion of exam.  Soft compartments throughout.  Limited abduction of the right shoulder due to pain.  No focal tenderness in the shoulder, clavicle, AC joint, or scapula.  No tenderness to the elbow, forearm, wrist, hand, or digits  Skin:    Skin is warm and dry.       Emergency Department Course     Imaging:  Humerus XR, G/E 2 views, right   Final Result   IMPRESSION: No acute fracture or dislocation.      ALIYA ZAMUDIO MD            SYSTEM ID:  [id number]         Report per radiology    Emergency Department Course & Assessments:  Independent Interpretation (X-rays, CTs, rhythm strip):  No fracture or dislocation on x-ray    Assessments:  1420: I performed an exam of the patient and obtained history, as documented above. I believe that she is safe for discharge at this time.    Disposition:  The patient was discharged to home.     Impression & Plan      Medical Decision Making:  This is a pleasant 47-year-old female presents for evaluation of right mid humerus pain after a mechanical fall yesterday.  X-ray shows no evidence of fracture or dislocation.  No pain on proportion or exam findings to suggest compartment syndrome.  No asymmetric erythema or warmth to suggest infectious  process.  Plan continue supportive cares with ice, Tylenol, and sling.  Orthopedic follow-up in 3 to 5 days for repeat evaluation in case this represents occult fracture.  Return precautions for worse pain or any other concerns.    Diagnosis:    ICD-10-CM    1. Crushing injury of right upper arm, initial encounter  S47.1XXA          Discharge Medications:  New Prescriptions    CYCLOBENZAPRINE (FLEXERIL) 10 MG TABLET    Take 1 tablet (10 mg) by mouth 3 times daily as needed for muscle spasms      Scribe Disclosure:  I, Heidi Patel, am serving as a scribe at 2:39 PM on 2/16/2023 to document services personally performed by Trung Ayala MD based on my observations and the provider's statements to me.      2/16/2023   Trung Ayala MD [place]ough[place], John Eric, MD  02/16/23 2003

## 2023-02-16 NOTE — ED TRIAGE NOTES
Slip and fall on ice Tuesday night. C/o right upper arm pain. Worse with movement. CMS intact. VSS

## 2023-04-23 ENCOUNTER — HEALTH MAINTENANCE LETTER (OUTPATIENT)
Age: 48
End: 2023-04-23

## 2023-04-25 ENCOUNTER — HOSPITAL ENCOUNTER (EMERGENCY)
Facility: CLINIC | Age: 48
Discharge: HOME | End: 2023-04-25
Attending: PHYSICIAN ASSISTANT | Admitting: PHYSICIAN ASSISTANT

## 2023-04-25 VITALS
DIASTOLIC BLOOD PRESSURE: 76 MMHG | OXYGEN SATURATION: 99 % | RESPIRATION RATE: 18 BRPM | HEART RATE: 66 BPM | SYSTOLIC BLOOD PRESSURE: 142 MMHG | TEMPERATURE: 98.2 F

## 2023-04-25 DIAGNOSIS — R10.13 ABDOMINAL PAIN, EPIGASTRIC: ICD-10-CM

## 2023-04-25 DIAGNOSIS — R19.7 VOMITING AND DIARRHEA: ICD-10-CM

## 2023-04-25 DIAGNOSIS — R11.10 VOMITING AND DIARRHEA: ICD-10-CM

## 2023-04-25 LAB
ALBUMIN SERPL BCG-MCNC: 4.3 G/DL (ref 3.5–5.2)
ALBUMIN UR-MCNC: NEGATIVE MG/DL
ALP SERPL-CCNC: 97 U/L (ref 35–104)
ALT SERPL W P-5'-P-CCNC: 28 U/L (ref 10–35)
ANION GAP SERPL CALCULATED.3IONS-SCNC: 11 MMOL/L (ref 7–15)
APPEARANCE UR: CLEAR
AST SERPL W P-5'-P-CCNC: 21 U/L (ref 10–35)
BASOPHILS # BLD AUTO: 0 10E3/UL (ref 0–0.2)
BASOPHILS NFR BLD AUTO: 1 %
BILIRUB SERPL-MCNC: 0.4 MG/DL
BILIRUB UR QL STRIP: NEGATIVE
BUN SERPL-MCNC: 10.1 MG/DL (ref 6–20)
CALCIUM SERPL-MCNC: 9.3 MG/DL (ref 8.6–10)
CHLORIDE SERPL-SCNC: 103 MMOL/L (ref 98–107)
COLOR UR AUTO: YELLOW
CREAT SERPL-MCNC: 0.59 MG/DL (ref 0.51–0.95)
DEPRECATED HCO3 PLAS-SCNC: 24 MMOL/L (ref 22–29)
EOSINOPHIL # BLD AUTO: 0.1 10E3/UL (ref 0–0.7)
EOSINOPHIL NFR BLD AUTO: 1 %
ERYTHROCYTE [DISTWIDTH] IN BLOOD BY AUTOMATED COUNT: 13.2 % (ref 10–15)
GFR SERPL CREATININE-BSD FRML MDRD: >90 ML/MIN/1.73M2
GLUCOSE SERPL-MCNC: 128 MG/DL (ref 70–99)
GLUCOSE UR STRIP-MCNC: NEGATIVE MG/DL
HCT VFR BLD AUTO: 42.3 % (ref 35–47)
HGB BLD-MCNC: 13.8 G/DL (ref 11.7–15.7)
HGB UR QL STRIP: NEGATIVE
HOLD SPECIMEN: NORMAL
HOLD SPECIMEN: NORMAL
IMM GRANULOCYTES # BLD: 0 10E3/UL
IMM GRANULOCYTES NFR BLD: 0 %
KETONES UR STRIP-MCNC: NEGATIVE MG/DL
LEUKOCYTE ESTERASE UR QL STRIP: NEGATIVE
LIPASE SERPL-CCNC: 18 U/L (ref 13–60)
LYMPHOCYTES # BLD AUTO: 3 10E3/UL (ref 0.8–5.3)
LYMPHOCYTES NFR BLD AUTO: 46 %
MCH RBC QN AUTO: 29.7 PG (ref 26.5–33)
MCHC RBC AUTO-ENTMCNC: 32.6 G/DL (ref 31.5–36.5)
MCV RBC AUTO: 91 FL (ref 78–100)
MONOCYTES # BLD AUTO: 0.4 10E3/UL (ref 0–1.3)
MONOCYTES NFR BLD AUTO: 6 %
MUCOUS THREADS #/AREA URNS LPF: PRESENT /LPF
NEUTROPHILS # BLD AUTO: 3 10E3/UL (ref 1.6–8.3)
NEUTROPHILS NFR BLD AUTO: 46 %
NITRATE UR QL: NEGATIVE
NRBC # BLD AUTO: 0 10E3/UL
NRBC BLD AUTO-RTO: 0 /100
PH UR STRIP: 5.5 [PH] (ref 5–7)
PLATELET # BLD AUTO: 378 10E3/UL (ref 150–450)
POTASSIUM SERPL-SCNC: 4 MMOL/L (ref 3.4–5.3)
PROT SERPL-MCNC: 7.3 G/DL (ref 6.4–8.3)
RBC # BLD AUTO: 4.65 10E6/UL (ref 3.8–5.2)
RBC URINE: <1 /HPF
SODIUM SERPL-SCNC: 138 MMOL/L (ref 136–145)
SP GR UR STRIP: 1.02 (ref 1–1.03)
SQUAMOUS EPITHELIAL: 1 /HPF
UROBILINOGEN UR STRIP-MCNC: NORMAL MG/DL
WBC # BLD AUTO: 6.6 10E3/UL (ref 4–11)
WBC URINE: <1 /HPF

## 2023-04-25 PROCEDURE — 81001 URINALYSIS AUTO W/SCOPE: CPT | Performed by: PHYSICIAN ASSISTANT

## 2023-04-25 PROCEDURE — 85004 AUTOMATED DIFF WBC COUNT: CPT | Performed by: EMERGENCY MEDICINE

## 2023-04-25 PROCEDURE — 96375 TX/PRO/DX INJ NEW DRUG ADDON: CPT

## 2023-04-25 PROCEDURE — 80053 COMPREHEN METABOLIC PANEL: CPT | Performed by: PHYSICIAN ASSISTANT

## 2023-04-25 PROCEDURE — 250N000013 HC RX MED GY IP 250 OP 250 PS 637: Performed by: PHYSICIAN ASSISTANT

## 2023-04-25 PROCEDURE — 96361 HYDRATE IV INFUSION ADD-ON: CPT

## 2023-04-25 PROCEDURE — 93005 ELECTROCARDIOGRAM TRACING: CPT

## 2023-04-25 PROCEDURE — 36415 COLL VENOUS BLD VENIPUNCTURE: CPT | Performed by: EMERGENCY MEDICINE

## 2023-04-25 PROCEDURE — 258N000003 HC RX IP 258 OP 636: Performed by: PHYSICIAN ASSISTANT

## 2023-04-25 PROCEDURE — 83690 ASSAY OF LIPASE: CPT | Performed by: PHYSICIAN ASSISTANT

## 2023-04-25 PROCEDURE — 96374 THER/PROPH/DIAG INJ IV PUSH: CPT

## 2023-04-25 PROCEDURE — 85025 COMPLETE CBC W/AUTO DIFF WBC: CPT | Performed by: PHYSICIAN ASSISTANT

## 2023-04-25 PROCEDURE — 250N000011 HC RX IP 250 OP 636: Performed by: PHYSICIAN ASSISTANT

## 2023-04-25 PROCEDURE — 250N000009 HC RX 250: Performed by: PHYSICIAN ASSISTANT

## 2023-04-25 PROCEDURE — 99284 EMERGENCY DEPT VISIT MOD MDM: CPT | Mod: 25

## 2023-04-25 PROCEDURE — 83690 ASSAY OF LIPASE: CPT | Performed by: EMERGENCY MEDICINE

## 2023-04-25 PROCEDURE — 80053 COMPREHEN METABOLIC PANEL: CPT | Performed by: EMERGENCY MEDICINE

## 2023-04-25 RX ORDER — ONDANSETRON 2 MG/ML
4 INJECTION INTRAMUSCULAR; INTRAVENOUS ONCE
Status: COMPLETED | OUTPATIENT
Start: 2023-04-25 | End: 2023-04-25

## 2023-04-25 RX ORDER — DICYCLOMINE HCL 20 MG
20 TABLET ORAL 3 TIMES DAILY PRN
Qty: 30 TABLET | Refills: 0 | Status: SHIPPED | OUTPATIENT
Start: 2023-04-25 | End: 2023-05-05

## 2023-04-25 RX ADMIN — SODIUM CHLORIDE 1000 ML: 9 INJECTION, SOLUTION INTRAVENOUS at 17:18

## 2023-04-25 RX ADMIN — FAMOTIDINE 20 MG: 10 INJECTION, SOLUTION INTRAVENOUS at 17:18

## 2023-04-25 RX ADMIN — ALUMINUM HYDROXIDE, MAGNESIUM HYDROXIDE, AND DIMETHICONE: 200; 20; 200 SUSPENSION ORAL at 17:14

## 2023-04-25 RX ADMIN — ONDANSETRON 4 MG: 2 INJECTION INTRAMUSCULAR; INTRAVENOUS at 17:17

## 2023-04-25 ASSESSMENT — ACTIVITIES OF DAILY LIVING (ADL): ADLS_ACUITY_SCORE: 33

## 2023-04-25 ASSESSMENT — ENCOUNTER SYMPTOMS
DIARRHEA: 1
ABDOMINAL PAIN: 1
NAUSEA: 1
VOMITING: 1
BLOOD IN STOOL: 0
FREQUENCY: 1

## 2023-04-25 NOTE — ED TRIAGE NOTES
Upper left abdominal pain since last night. N/V/D. Hx of similar. Has been told its pancreatitis and IBS in the past.

## 2023-04-25 NOTE — ED PROVIDER NOTES
History     Chief Complaint:  Abdominal Pain       The history is provided by the patient.      Adriane Garrett is a 47 year old female with a history of LEBRON and BSO, cholecystectomy, chronic abdominal pain, IBS, and pancreatitis who presents with abdominal pain, nausea, vomiting, and diarrhea. She provides that since 2200 last night, she has had a LUQ abdominal pain and epigastric pain that she has had before. She notes that she has been told in the past that she had IBS and pancreatitis with this type of pain. Associated with the pain, she complains of nausea, vomiting, diarrhea, and urinary frequency. Denies any hematemesis, blood in stool, or fever. She tried taking Tylenol PM last night for the pain, but has not taken anything today. Nothing taken for the nausea. She denies any NSAIDs and rarely drinks alcohol. No known sick contacts, recent travel out of the [place], or recent antibiotic use. No chest pain, cough or sob.      Independent Historian:   None - Patient Only    Review of External Notes: I reviewed the patient's care plan and note numerous visits for abdominal pain in the past.    ROS:  Review of Systems   Gastrointestinal: Positive for abdominal pain (LUQ), diarrhea, nausea and vomiting (no blood). Negative for blood in stool.   Genitourinary: Positive for frequency.   All other systems reviewed and are negative.      Allergies:  Ampicillin  Aspirin  Chantix [Varenicline]  Ciprofloxacin  Penicillins  Tessalon [Benzonatate]   Lamotrigine  Ranitidine  Quinolones  Citalopram    Medications:    Albuterol  Flexeril  Pepcid  Reglan  Zofran  Imitrex  Pamelor    Past Medical History:    IBS  Pancreatitis  Darier disease  Bipolar I disorder  Morbid obesity  Vitamin D deficiency  Hyperlipidemia  Nonalcoholic fatty liver disease  Migraine  Hypertension  Chronic abdominal pain  TMJ pain  Community acquired pneumonia  Sinus bradycardia  Narcotic dependence  Neuralgia of abdomen  Tongue irritation  Oral  candidiasis  Chronic bronchitis  Endometriosis  GERD  Depression    Past Surgical History:    Foot surgery  Cholecystectomy  Tubal ligation  LEBRON and BSO  Subtalar arthroereisis  Myringotomy with tube placement  EGD combined    Family History:    Aneurysm  Coronary stenting  Diabetes  Heart disease    Social History:  Patient came from home.  Patient is unaccompanied in the ED.  Patient rarely drinks alcohol.  PCP: No Ref-Primary, Physician     Physical Exam     Patient Vitals for the past 24 hrs:   BP Temp Temp src Pulse Resp SpO2   04/25/23 1435 139/84 98.2  F (36.8  C) Temporal 60 20 97 %        Physical Exam  General: Awake, alert, non-toxic.  Head:  Scalp is NC/AT  Eyes:  Conjunctiva normal, PERRL  ENT:  The external nose and ears are normal.     Oropharynx clear, uvula midline.  Neck:  Normal range of motion without rigidity.  CV:  Regular rate and rhythm    No pathologic murmur, rubs, or gallops.  Resp:  Breath sounds are clear bilaterally    Non-labored, no retractions or accessory muscle use  Abdomen: Abdomen is soft, no distension, mild epigastric tenderness, no masses. No rebound or guarding. No CVA tenderness.  MS:  No lower extremity edema/swelling. No midline cervical, thoracic, or lumbar tenderness.  Extremities without joint swelling or redness.  Skin:  Warm and dry, No rash or lesions noted.  Neuro: Alert and oriented.  GCS 15 Moves all extremities normal.  No facial asymmetry. Gait normal.  Psych:  Awake. Alert. Normal affect. Appropriate interactions.      Emergency Department Course     Laboratory:  Labs Ordered and Resulted from Time of ED Arrival to Time of ED Departure   COMPREHENSIVE METABOLIC PANEL - Abnormal       Result Value    Sodium 138      Potassium 4.0      Chloride 103      Carbon Dioxide (CO2) 24      Anion Gap 11      Urea Nitrogen 10.1      Creatinine 0.59      Calcium 9.3      Glucose 128 (*)     Alkaline Phosphatase 97      AST 21      ALT 28      Protein Total 7.3      Albumin  4.3      Bilirubin Total 0.4      GFR Estimate >90     ROUTINE UA WITH MICROSCOPIC REFLEX TO CULTURE - Abnormal    Color Urine Yellow      Appearance Urine Clear      Glucose Urine Negative      Bilirubin Urine Negative      Ketones Urine Negative      Specific Gravity Urine 1.023      Blood Urine Negative      pH Urine 5.5      Protein Albumin Urine Negative      Urobilinogen Urine Normal      Nitrite Urine Negative      Leukocyte Esterase Urine Negative      Mucus Urine Present (*)     RBC Urine <1      WBC Urine <1      Squamous Epithelials Urine 1     LIPASE - Normal    Lipase 18     CBC WITH PLATELETS AND DIFFERENTIAL    WBC Count 6.6      RBC Count 4.65      Hemoglobin 13.8      Hematocrit 42.3      MCV 91      MCH 29.7      MCHC 32.6      RDW 13.2      Platelet Count 378      % Neutrophils 46      % Lymphocytes 46      % Monocytes 6      % Eosinophils 1      % Basophils 1      % Immature Granulocytes 0      NRBCs per 100 WBC 0      Absolute Neutrophils 3.0      Absolute Lymphocytes 3.0      Absolute Monocytes 0.4      Absolute Eosinophils 0.1      Absolute Basophils 0.0      Absolute Immature Granulocytes 0.0      Absolute NRBCs 0.0        Emergency Department Course & Assessments:       Interventions:  Medications   0.9% sodium chloride BOLUS (1,000 mLs Intravenous $New Bag 4/25/23 1718)   ondansetron (ZOFRAN) injection 4 mg (4 mg Intravenous $Given 4/25/23 1717)   famotidine (PEPCID) injection 20 mg (20 mg Intravenous $Given 4/25/23 1718)   lidocaine (viscous) (XYLOCAINE) 2 % 15 mL, alum & mag hydroxide-simethicone (MAALOX) 15 mL GI Cocktail ( Oral $Given 4/25/23 1714)        Assessments:  1653 I obtained history and examined the patient as noted above.  1811 I rechecked the patient and explained findings. She felt better, tolerating PO.    Independent Interpretation (X-rays, CTs, rhythm strip):  None    Consultations/Discussion of Management or Tests:  None     Social Determinants of Health affecting care:    None    Disposition:  The patient was discharged to home.     Impression & Plan      CMS Diagnoses: None    Medical Decision Making:  Adriane Garrett is a 47 year old female with hx chronic abdominal pain and diarrhea presents for evaluation epigastric LUQ pain started this am w/vomiting and diarrhea. She has a benign abdominal exam without focal RLQ or LLQ tenderness to suggest diverticulitis or appendicitis, respectively, or other acute abdominal process.  Lipase and LFTs are reassuringly normal pointing against retained common bile duct stone or pancreatitis.  Considered splenic infarct but I feel this is very unlikely given symptoms and presentation and risk factors.  I did discuss the sometimes vague initial constellation of symptoms early in the course of acute abdominal processes, and the need for immediate return should pain or other concerning symptoms develop.  Symptoms are improved after IV fluids and symptomatic treatment.  The patient is status postcholecystectomy and hysterectomy. There is no evidence of urinary tract infection. There has been no travel or antibiotic exposure to suggest more concerning cause of diarrhea, and there has been no hematemesis or BRBPR/melena.  Vital signs have remained stable throughout the ED course, and the abdominal re-exam remains benign. No symptoms to suggest referred pain from chest or atypical ACS.      Patient felt better after symptomatic treatment here.  I reviewed the patient's care plan in detail and given her reassuring work-up benign exam normal white blood cell count I do not feel advanced imaging is indicated at this time and the patient is in agreement with this plan.  Suspect gastroenteritis versus gastritis/PUD.  Nothing to suggest GI bleed or perforation.  I believe patient is safe for discharge in improved condition at this time with strict return precautions for recurrent vomiting, pain, fever or any other concerning symptoms.      Diagnosis:     ICD-10-CM    1. Vomiting and diarrhea  R11.10     R19.7       2. Abdominal pain, epigastric  R10.13            Discharge Medications:  New Prescriptions    No medications on file        Scribe Disclosure:  I, Jayson Toth, am serving as a scribe at 4:49 PM on 4/25/2023 to document services personally performed by Tj Jose PA-C based on my observations and the provider's statements to me.       Tj Jose PA-C  04/25/23 3055

## 2023-05-23 ENCOUNTER — APPOINTMENT (OUTPATIENT)
Dept: GENERAL RADIOLOGY | Facility: CLINIC | Age: 48
End: 2023-05-23
Attending: EMERGENCY MEDICINE

## 2023-05-23 ENCOUNTER — HOSPITAL ENCOUNTER (EMERGENCY)
Facility: CLINIC | Age: 48
Discharge: HOME | End: 2023-05-23
Attending: EMERGENCY MEDICINE | Admitting: EMERGENCY MEDICINE

## 2023-05-23 VITALS
DIASTOLIC BLOOD PRESSURE: 92 MMHG | RESPIRATION RATE: 16 BRPM | HEART RATE: 60 BPM | TEMPERATURE: 97.9 F | SYSTOLIC BLOOD PRESSURE: 180 MMHG | OXYGEN SATURATION: 97 %

## 2023-05-23 DIAGNOSIS — M79.644 THUMB PAIN, RIGHT: ICD-10-CM

## 2023-05-23 PROCEDURE — 99284 EMERGENCY DEPT VISIT MOD MDM: CPT

## 2023-05-23 PROCEDURE — 73140 X-RAY EXAM OF FINGER(S): CPT | Mod: RT

## 2023-05-23 PROCEDURE — 29125 APPL SHORT ARM SPLINT STATIC: CPT | Mod: RT

## 2023-05-23 ASSESSMENT — ACTIVITIES OF DAILY LIVING (ADL): ADLS_ACUITY_SCORE: 33

## 2023-05-23 NOTE — ED PROVIDER NOTES
History     Chief Complaint:  Hand Pain       The history is provided by the patient.      Adriane Garrett is a 47 year old female who presents with hand pain. The patient reports that she woke up this morning with pain with movement of her right thumb. She denies suffering any known trauma or injuries. She states that she usually moves a lot in her sleep.     Independent Historian:   None - Patient Only    Review of External Notes:   Care plan    Medications:    Aluminum-magnesium hydroxide-simethicone  Benzonatate  Diphenhydramine   Hydroxyzine  Lisinopril  Nortriptyline  Omeprazole  Polyethylene glycol    Past Medical History:    Asthma, mild intermittent  Bipolar 1 disorder  Chronic nonalcoholic liver disease  GERD  Hyperlipidemia   Hypertension  Irritable bowel syndrome  Morbid obesity  Pancreatitis  Tobacco use disorder  Vitamin D deficiency    Past Surgical History:   Cholecystectomy  Foot surgery, right  Hysterectomy, total abdominal, bilateral salpingo-oophorectomy    Physical Exam     Patient Vitals for the past 24 hrs:   BP Temp Temp src Pulse Resp SpO2   05/23/23 1751 -- -- -- 60 16 97 %   05/23/23 1543 (!) 180/92 97.9  F (36.6  C) Temporal 62 16 95 %        Physical Exam  General: Patient is well appearing. No distress.  Head: Atraumatic.  Neck: Normal range of motion. Neck supple.   Cardiovascular: Normal rate, regular rhythm, normal heart sounds and intact distal pulses.   Pulmonary/Chest: Breath sounds normal. No respiratory distress.  Abdominal: Soft. Bowel sounds are normal. No distension. No tenderness. No rebound or guarding.   Musculoskeletal: Pain in the base of the right thumb without redness swelling or deformities. Is able to extend without pain, abduction, adduction, and flexion of thumb produces minor pain.  Skin: Warm and dry. No rash noted. Not diaphoretic.     Emergency Department Course     Imaging:  XR Finger Right G/E 2 Views   Final Result   IMPRESSION: There is no evidence of an  acute fracture, with attention to the thumb. No significant joint space narrowing.            Report per radiology    Emergency Department Course & Assessments:    Interventions:  None    Assessments:  1637 I obtained history and examined the patient.    Independent Interpretation (X-rays, CTs, rhythm strip):  I reviewed the patient's hand X-ray, I note no fracture.    Consultations/Discussion of Management or Tests:  None     Social Determinants of Health affecting care:   None    Disposition:  The patient was discharged to home.     Impression & Plan      Medical Decision Makin year old female presents with right thumb pain. Patient history and records reviewed. Broad differential was considered. Patient is well appearing with stable vitals. X-rays were obtained which demonstrate no evidence of fracture or dislocation. Examination of joint above and below does not suggest referred pain and do not feel radiographs of these joints are indicated at this time. Neurovascular status is intact distally and no signs of compartment syndrome. I did consider occult scaphoid fracture, however I feel this is unlikely given examination findings and absence of tenderness or pain in anatomic snuffbox.  No signs tendonous defect in fingers or hand. I doubt septic arthritis or inflammatory gout/pseudogout given absence of effusion, joint redness, afebrile, normal active and passive ROM. No clinical evidence of overlying skin or soft tissue infection such as cellulitis or necrotizing soft tissue infection.   Thumb spica applied velcro.    Recommended conservative treatment with NSAIDS, ice, stretching and follow-up in clinic if symptoms not improving.      All questions and concerns were answered. The patient was discharged home and recommended to follow up with his primary physician and return with any new or worsening symptoms.     Diagnosis:    ICD-10-CM    1. Thumb pain, right  M79.644            Discharge  Medications:  Discharge Medication List as of 5/23/2023  5:48 PM           Scribe Disclosure:  I, DONNY Pendleton am serving as a scribe at 4:39 PM on 5/23/2023 to document services personally performed by Carlos Chong MD based on my observations and the provider's statements to me.        Carlos Chong MD  05/23/23 2543

## 2023-05-23 NOTE — ED TRIAGE NOTES
Patient reports that she woke up this morning with right thumb pain. Denies injury to thumb. No deformity, swelling, bruising or injury noted to thumb. CMS intact. Patient states that her thumb only hurts when she moves it.

## 2023-07-03 ENCOUNTER — HOSPITAL ENCOUNTER (EMERGENCY)
Facility: CLINIC | Age: 48
Discharge: HOME | End: 2023-07-04
Attending: EMERGENCY MEDICINE | Admitting: EMERGENCY MEDICINE

## 2023-07-03 DIAGNOSIS — R10.9 CHRONIC ABDOMINAL PAIN: ICD-10-CM

## 2023-07-03 DIAGNOSIS — R19.7 DIARRHEA, UNSPECIFIED TYPE: ICD-10-CM

## 2023-07-03 DIAGNOSIS — G89.29 CHRONIC ABDOMINAL PAIN: ICD-10-CM

## 2023-07-03 LAB
ALBUMIN SERPL BCG-MCNC: 4.5 G/DL (ref 3.5–5.2)
ALP SERPL-CCNC: 83 U/L (ref 35–104)
ALT SERPL W P-5'-P-CCNC: 30 U/L (ref 0–50)
ANION GAP SERPL CALCULATED.3IONS-SCNC: 14 MMOL/L (ref 7–15)
AST SERPL W P-5'-P-CCNC: 28 U/L (ref 0–45)
BASOPHILS # BLD AUTO: 0 10E3/UL (ref 0–0.2)
BASOPHILS NFR BLD AUTO: 1 %
BILIRUB SERPL-MCNC: 0.5 MG/DL
BUN SERPL-MCNC: 8.8 MG/DL (ref 6–20)
CALCIUM SERPL-MCNC: 9.7 MG/DL (ref 8.6–10)
CHLORIDE SERPL-SCNC: 102 MMOL/L (ref 98–107)
CREAT SERPL-MCNC: 0.69 MG/DL (ref 0.51–0.95)
DEPRECATED HCO3 PLAS-SCNC: 24 MMOL/L (ref 22–29)
EOSINOPHIL # BLD AUTO: 0.1 10E3/UL (ref 0–0.7)
EOSINOPHIL NFR BLD AUTO: 1 %
ERYTHROCYTE [DISTWIDTH] IN BLOOD BY AUTOMATED COUNT: 13.2 % (ref 10–15)
GFR SERPL CREATININE-BSD FRML MDRD: >90 ML/MIN/1.73M2
GLUCOSE SERPL-MCNC: 100 MG/DL (ref 70–99)
HCG SERPL QL: NEGATIVE
HCT VFR BLD AUTO: 42.3 % (ref 35–47)
HGB BLD-MCNC: 13.7 G/DL (ref 11.7–15.7)
HOLD SPECIMEN: NORMAL
IMM GRANULOCYTES # BLD: 0 10E3/UL
IMM GRANULOCYTES NFR BLD: 0 %
LYMPHOCYTES # BLD AUTO: 3.7 10E3/UL (ref 0.8–5.3)
LYMPHOCYTES NFR BLD AUTO: 46 %
MCH RBC QN AUTO: 29.7 PG (ref 26.5–33)
MCHC RBC AUTO-ENTMCNC: 32.4 G/DL (ref 31.5–36.5)
MCV RBC AUTO: 92 FL (ref 78–100)
MONOCYTES # BLD AUTO: 0.5 10E3/UL (ref 0–1.3)
MONOCYTES NFR BLD AUTO: 7 %
NEUTROPHILS # BLD AUTO: 3.5 10E3/UL (ref 1.6–8.3)
NEUTROPHILS NFR BLD AUTO: 45 %
NRBC # BLD AUTO: 0 10E3/UL
NRBC BLD AUTO-RTO: 0 /100
PLATELET # BLD AUTO: 367 10E3/UL (ref 150–450)
POTASSIUM SERPL-SCNC: 4.2 MMOL/L (ref 3.4–5.3)
PROT SERPL-MCNC: 7.6 G/DL (ref 6.4–8.3)
RBC # BLD AUTO: 4.61 10E6/UL (ref 3.8–5.2)
SODIUM SERPL-SCNC: 140 MMOL/L (ref 136–145)
WBC # BLD AUTO: 7.7 10E3/UL (ref 4–11)

## 2023-07-03 PROCEDURE — 85025 COMPLETE CBC W/AUTO DIFF WBC: CPT | Performed by: EMERGENCY MEDICINE

## 2023-07-03 PROCEDURE — 99283 EMERGENCY DEPT VISIT LOW MDM: CPT

## 2023-07-03 PROCEDURE — 36415 COLL VENOUS BLD VENIPUNCTURE: CPT | Performed by: EMERGENCY MEDICINE

## 2023-07-03 PROCEDURE — 84703 CHORIONIC GONADOTROPIN ASSAY: CPT | Performed by: EMERGENCY MEDICINE

## 2023-07-03 PROCEDURE — 80053 COMPREHEN METABOLIC PANEL: CPT | Performed by: EMERGENCY MEDICINE

## 2023-07-03 PROCEDURE — 81001 URINALYSIS AUTO W/SCOPE: CPT | Performed by: EMERGENCY MEDICINE

## 2023-07-04 VITALS
RESPIRATION RATE: 18 BRPM | OXYGEN SATURATION: 97 % | DIASTOLIC BLOOD PRESSURE: 99 MMHG | HEART RATE: 62 BPM | SYSTOLIC BLOOD PRESSURE: 172 MMHG | BODY MASS INDEX: 52.61 KG/M2 | TEMPERATURE: 98.7 F | WEIGHT: 269.4 LBS

## 2023-07-04 LAB
ALBUMIN UR-MCNC: 10 MG/DL
APPEARANCE UR: CLEAR
BILIRUB UR QL STRIP: NEGATIVE
COLOR UR AUTO: YELLOW
GLUCOSE UR STRIP-MCNC: NEGATIVE MG/DL
HGB UR QL STRIP: NEGATIVE
HYALINE CASTS: 1 /LPF
KETONES UR STRIP-MCNC: NEGATIVE MG/DL
LEUKOCYTE ESTERASE UR QL STRIP: NEGATIVE
MUCOUS THREADS #/AREA URNS LPF: PRESENT /LPF
NITRATE UR QL: NEGATIVE
PH UR STRIP: 5 [PH] (ref 5–7)
RBC URINE: 1 /HPF
SP GR UR STRIP: 1.03 (ref 1–1.03)
SQUAMOUS EPITHELIAL: 1 /HPF
UROBILINOGEN UR STRIP-MCNC: NORMAL MG/DL
WBC URINE: 1 /HPF

## 2023-07-04 ASSESSMENT — ACTIVITIES OF DAILY LIVING (ADL): ADLS_ACUITY_SCORE: 35

## 2023-07-04 NOTE — ED TRIAGE NOTES
CC: RLQ abd pain that began at 11am. Hadn't ate anything for breakfast. Tried some toast, that made it worse.  Associated S/S: Diarrhea started at 10am. No vomiting.     Triage Assessment     Row Name 07/03/23 1940       Triage Assessment (Adult)    Airway WDL WDL       Cognitive/Neuro/Behavioral WDL    Cognitive/Neuro/Behavioral WDL WDL

## 2023-07-04 NOTE — ED PROVIDER NOTES
History     Chief Complaint:  Abdominal Pain and Diarrhea     HPI   Adriane Garrett is a 47 year old female with a history of IBS who presents with lower abdominal pain, which onset at 1100, about 13 hours ago. Her pain is worse with moment as well. She did not eat anything before this onset, but she tried eating some toast after, which only made her pain worse. She has been having associated diarrhea as well that began before her pain; has has been having nausea as well. No vomiting or fever. No recent travel or antibiotics.     Patient has an emergency department care plan initiated October 2021 which was reviewed    Independent Historian:   None - Patient Only    Review of External Notes:   Care everywhere reviewed in epic updated    Medications:    albuterol (PROAIR HFA/PROVENTIL HFA/VENTOLIN HFA) 108 (90 Base) MCG/ACT inhaler  diclofenac (VOLTAREN) 1 % topical gel  famotidine (PEPCID) 20 MG tablet  SUMAtriptan (IMITREX) 25 MG tablet      Past Medical History:    Past Medical History:   Diagnosis Date     Asthma      Bipolar disorder      Gastroesophageal reflux disease      Hyperlipidemia      IBS (irritable bowel syndrome)      Migraine      Obesity      Past Surgical History:    Past Surgical History:   Procedure Laterality Date     CHOLECYSTECTOMY       FOOT SURGERY Right      HYSTERECTOMY TOTAL ABDOMINAL, BILATERAL SALPINGO-OOPHORECTOMY, COMBINED      multiple surgeries        Physical Exam     Patient Vitals for the past 24 hrs:   BP Temp Temp src Pulse Resp SpO2 Weight   07/04/23 0020 172/99 -- -- 62 18 97 % --   07/03/23 1942 165/91 98.7  F (37.1  C) Oral 81 22 96 % --   07/03/23 1940 -- -- -- -- -- -- 122.2 kg (269 lb 6.4 oz)      Physical Exam  Nursing note and vitals reviewed.  Constitutional: Cooperative. Sitting up comfortably in bed  HENT:   Mouth/Throat: Mucous membranes are normal.   Cardiovascular: Normal rate, regular rhythm and normal heart sounds.  No murmur.  Pulmonary/Chest: Effort normal  and breath sounds normal. No respiratory distress. No wheezes. No rales.   Abdominal: Soft. Normal appearance and bowel sounds are normal. No distension.  There is no rigidity and no guarding. Mild lower abdominal tenderness.   Neurological: Alert. Oriented x4  Skin: Skin is warm and dry.   Psychiatric: Normal mood and affect.      Emergency Department Course     Laboratory:  Labs Ordered and Resulted from Time of ED Arrival to Time of ED Departure   ROUTINE UA WITH MICROSCOPIC REFLEX TO CULTURE - Abnormal       Result Value    Color Urine Yellow      Appearance Urine Clear      Glucose Urine Negative      Bilirubin Urine Negative      Ketones Urine Negative      Specific Gravity Urine 1.030      Blood Urine Negative      pH Urine 5.0      Protein Albumin Urine 10 (*)     Urobilinogen Urine Normal      Nitrite Urine Negative      Leukocyte Esterase Urine Negative      Mucus Urine Present (*)     RBC Urine 1      WBC Urine 1      Squamous Epithelials Urine 1      Hyaline Casts Urine 1     COMPREHENSIVE METABOLIC PANEL - Abnormal    Sodium 140      Potassium 4.2      Chloride 102      Carbon Dioxide (CO2) 24      Anion Gap 14      Urea Nitrogen 8.8      Creatinine 0.69      Calcium 9.7      Glucose 100 (*)     Alkaline Phosphatase 83      AST 28      ALT 30      Protein Total 7.6      Albumin 4.5      Bilirubin Total 0.5      GFR Estimate >90     HCG QUALITATIVE PREGNANCY - Normal    hCG Serum Qualitative Negative     CBC WITH PLATELETS AND DIFFERENTIAL    WBC Count 7.7      RBC Count 4.61      Hemoglobin 13.7      Hematocrit 42.3      MCV 92      MCH 29.7      MCHC 32.4      RDW 13.2      Platelet Count 367      % Neutrophils 45      % Lymphocytes 46      % Monocytes 7      % Eosinophils 1      % Basophils 1      % Immature Granulocytes 0      NRBCs per 100 WBC 0      Absolute Neutrophils 3.5      Absolute Lymphocytes 3.7      Absolute Monocytes 0.5      Absolute Eosinophils 0.1      Absolute Basophils 0.0       Absolute Immature Granulocytes 0.0      Absolute NRBCs 0.0        Interventions:  None    Assessments:  0000 I obtained the history and examined the patient as noted above.   0104 I rechecked the patient and discussed discharge.     Independent Interpretation (X-rays, CTs, rhythm strip):  None    Consultations/Discussion of Management or Tests:  None     Social Determinants of Health affecting care:   Stress/Adjustment Disorders    Disposition:  The patient was discharged to home.     Impression & Plan      Medical Decision Makin-year-old female who presents with diarrhea for less than a day.  No recent travel or antibiotics.  No blood in the stool.  No fevers.  She also has lower abdominal tenderness.  Abdominal exam is reassuring without guarding or rigidity.  She does have chronic abdominal pain with an emergency department care policy in place.  Given normal laboratory evaluation and reassuring exam I would hold on advanced imaging of the abdomen per her care plan recommendations.  The patient is comfortable with this discussion and will be discharged home.  Clinical concern for an acute surgical or life-threatening process in the abdomen is minimal    Diagnosis:    ICD-10-CM    1. Chronic abdominal pain  R10.9     G89.29       2. Diarrhea, unspecified type  R19.7          Scribe Disclosure:  I, Joshua Kjer, am serving as a scribe at 12:00 AM on 2023 to document services personally performed by Trung Becerril MD based on my observations and the provider's statements to me.   7/3/2023   Trung Becerril MD Amdahl, John, MD  [phone]

## 2023-07-11 ENCOUNTER — HOSPITAL ENCOUNTER (EMERGENCY)
Facility: CLINIC | Age: 48
Discharge: HOME | End: 2023-07-11
Attending: PHYSICIAN ASSISTANT | Admitting: PHYSICIAN ASSISTANT

## 2023-07-11 VITALS
TEMPERATURE: 98.1 F | HEART RATE: 50 BPM | OXYGEN SATURATION: 97 % | DIASTOLIC BLOOD PRESSURE: 89 MMHG | SYSTOLIC BLOOD PRESSURE: 144 MMHG | RESPIRATION RATE: 18 BRPM

## 2023-07-11 DIAGNOSIS — A46 ERYSIPELAS: ICD-10-CM

## 2023-07-11 LAB
ANION GAP SERPL CALCULATED.3IONS-SCNC: 12 MMOL/L (ref 7–15)
BASOPHILS # BLD AUTO: 0 10E3/UL (ref 0–0.2)
BASOPHILS NFR BLD AUTO: 1 %
BUN SERPL-MCNC: 7.9 MG/DL (ref 6–20)
CALCIUM SERPL-MCNC: 9.2 MG/DL (ref 8.6–10)
CHLORIDE SERPL-SCNC: 103 MMOL/L (ref 98–107)
CREAT SERPL-MCNC: 0.65 MG/DL (ref 0.51–0.95)
DEPRECATED HCO3 PLAS-SCNC: 25 MMOL/L (ref 22–29)
EOSINOPHIL # BLD AUTO: 0.1 10E3/UL (ref 0–0.7)
EOSINOPHIL NFR BLD AUTO: 1 %
ERYTHROCYTE [DISTWIDTH] IN BLOOD BY AUTOMATED COUNT: 13.2 % (ref 10–15)
GFR SERPL CREATININE-BSD FRML MDRD: >90 ML/MIN/1.73M2
GLUCOSE SERPL-MCNC: 102 MG/DL (ref 70–99)
HCT VFR BLD AUTO: 41.2 % (ref 35–47)
HGB BLD-MCNC: 13.4 G/DL (ref 11.7–15.7)
IMM GRANULOCYTES # BLD: 0 10E3/UL
IMM GRANULOCYTES NFR BLD: 0 %
LYMPHOCYTES # BLD AUTO: 2.5 10E3/UL (ref 0.8–5.3)
LYMPHOCYTES NFR BLD AUTO: 47 %
MCH RBC QN AUTO: 30 PG (ref 26.5–33)
MCHC RBC AUTO-ENTMCNC: 32.5 G/DL (ref 31.5–36.5)
MCV RBC AUTO: 92 FL (ref 78–100)
MONOCYTES # BLD AUTO: 0.4 10E3/UL (ref 0–1.3)
MONOCYTES NFR BLD AUTO: 9 %
NEUTROPHILS # BLD AUTO: 2.2 10E3/UL (ref 1.6–8.3)
NEUTROPHILS NFR BLD AUTO: 42 %
NRBC # BLD AUTO: 0 10E3/UL
NRBC BLD AUTO-RTO: 0 /100
PLATELET # BLD AUTO: 313 10E3/UL (ref 150–450)
POTASSIUM SERPL-SCNC: 3.8 MMOL/L (ref 3.4–5.3)
RBC # BLD AUTO: 4.47 10E6/UL (ref 3.8–5.2)
SODIUM SERPL-SCNC: 140 MMOL/L (ref 136–145)
WBC # BLD AUTO: 5.2 10E3/UL (ref 4–11)

## 2023-07-11 PROCEDURE — 82310 ASSAY OF CALCIUM: CPT | Performed by: PHYSICIAN ASSISTANT

## 2023-07-11 PROCEDURE — 99283 EMERGENCY DEPT VISIT LOW MDM: CPT

## 2023-07-11 PROCEDURE — 36415 COLL VENOUS BLD VENIPUNCTURE: CPT | Performed by: PHYSICIAN ASSISTANT

## 2023-07-11 PROCEDURE — 250N000013 HC RX MED GY IP 250 OP 250 PS 637: Performed by: PHYSICIAN ASSISTANT

## 2023-07-11 PROCEDURE — 85025 COMPLETE CBC W/AUTO DIFF WBC: CPT | Performed by: PHYSICIAN ASSISTANT

## 2023-07-11 RX ORDER — CLINDAMYCIN HCL 150 MG
450 CAPSULE ORAL 3 TIMES DAILY
Qty: 63 CAPSULE | Refills: 0 | Status: SHIPPED | OUTPATIENT
Start: 2023-07-11

## 2023-07-11 RX ORDER — ACETAMINOPHEN 325 MG/1
650 TABLET ORAL ONCE
Status: COMPLETED | OUTPATIENT
Start: 2023-07-11 | End: 2023-07-11

## 2023-07-11 RX ADMIN — ACETAMINOPHEN 650 MG: 325 TABLET, FILM COATED ORAL at 17:30

## 2023-07-11 ASSESSMENT — ACTIVITIES OF DAILY LIVING (ADL): ADLS_ACUITY_SCORE: 35

## 2023-07-11 NOTE — ED PROVIDER NOTES
History     Chief Complaint:  Otalgia     The history is provided by the patient.      Adriane Garrett is a 47 year old female with a history of hypertension, hyperlipidemia, morbid obesity, COPD, and tobacco use disorder who presents after waking up with right ear redness, pain, and swelling this morning. The patient denies any associated ear drainage and has had no injuries to the ear. She also denies any fever, chills, cough, sore throat, or vomiting and has had no recent illness. She has been eating normally. She has no primary care provider.    Independent Historian:   None - Patient Only    Review of External Notes:   None    Medications:    Albuterol inhaler  Pepcid  Imitrex    Past Medical History:    Asthma  Bipolar 1 disorder  GERD  Hyperlipidemia  IBS  Migraines  Vitamin D deficiency  Pancreatitis   Chronic nonalcoholic liver disease  Morbid obesity  Hypertension  Depression  Ovarian cyst  Homelessness  Chronic abdominal pain   CAP  Tobacco use disorder  Narcotic dependence  Cigarette nicotine dependence in remission  Chronic bronchitis  Postcholecystectomy syndrome  Chronic low back pain  Hypomagnesemia  Herpes zoster   COPD    Past Surgical History:    Cholecystectomy  Right foot surgery  LEBRON w/ BSO   Tubal ligation  Subaltar arthroereisis  Laparoscopy abdomen diagnostic  Myringotomy w/ tube placement  EGD    Physical Exam     Patient Vitals for the past 24 hrs:   BP Temp Temp src Pulse Resp SpO2   07/11/23 1647 (!) 144/89 -- -- 50 18 97 %   07/11/23 1343 (!) 175/91 98.1  F (36.7  C) Oral 56 17 98 %      Physical Exam  Constitutional: Alert, attentive, GCS 15  HENT:    Nose: Nose normal.    Mouth/Throat: Oropharynx is clear, mucous membranes are moist    Ears: Redness, warmth, and edema to right pinna. Right external canal and TM appear normal.  Left external ear, external canal, and TM appear normal.  No evidence of mastoid tenderness, bogginess, erythema, fluctuance, or swelling.  Eyes: EOM are  normal.   CV: regular rate and rhythm; no murmurs, rubs or gallups  Chest: Effort normal and breath sounds normal.   GI:  There is no tenderness. No distension. Normal bowel sounds  MSK: Normal range of motion.   Neurological: Alert, attentive  Skin: Skin is warm and dry.    Emergency Department Course     Laboratory:  Labs Ordered and Resulted from Time of ED Arrival to Time of ED Departure   BASIC METABOLIC PANEL - Abnormal       Result Value    Sodium 140      Potassium 3.8      Chloride 103      Carbon Dioxide (CO2) 25      Anion Gap 12      Urea Nitrogen 7.9      Creatinine 0.65      Calcium 9.2      Glucose 102 (*)     GFR Estimate >90     CBC WITH PLATELETS AND DIFFERENTIAL    WBC Count 5.2      RBC Count 4.47      Hemoglobin 13.4      Hematocrit 41.2      MCV 92      MCH 30.0      MCHC 32.5      RDW 13.2      Platelet Count 313      % Neutrophils 42      % Lymphocytes 47      % Monocytes 9      % Eosinophils 1      % Basophils 1      % Immature Granulocytes 0      NRBCs per 100 WBC 0      Absolute Neutrophils 2.2      Absolute Lymphocytes 2.5      Absolute Monocytes 0.4      Absolute Eosinophils 0.1      Absolute Basophils 0.0      Absolute Immature Granulocytes 0.0      Absolute NRBCs 0.0        Emergency Department Course & Assessments:  Interventions:  Medications   acetaminophen (TYLENOL) tablet 650 mg (650 mg Oral $Given 7/11/23 1730)      Assessments:  1610: I performed an exam of the patient and obtained history, as documented above.   1655: I rechecked the patient and explained findings. I believe that she is safe for discharge at this time.    Independent Interpretation (X-rays, CTs, rhythm strip):  None    Consultations/Discussion of Management or Tests:  None     Social Determinants of Health affecting care:   None    Disposition:  The patient was discharged to home.     Impression & Plan      Medical Decision Making:  Patient is a well-appearing 47-year-old female who presents with right ear  pinna redness, warmth, and mild edema since waking up this morning.  She is afebrile, slightly hypertensive at 144/89, nonhypoxic.  Physical examination reveals right ear as described above without evidence of otitis media or otitis externa bilaterally.  There is no evidence of mastoiditis.  Symptoms are suspicious for erysipelas.  Patient denies recent injury or skin wound to this area.  There is no evidence of drainable abscess at this time.  Preliminary labs checked and there is no evidence of leukocytosis.  Electrolytes are grossly normal.  I have low suspicion for systemic illness or sepsis.  She is otherwise well-appearing and tolerating oral intake.  She may be managed as an outpatient.  Patient will be treated for erysipelas of the right ear with antibiotics.  She does have a penicillin allergy and states she cannot take any type of penicillin. Patient also has a benzonatate allergy making Bactrim unsuitable.  Considering this patient will be sent home with clindamycin. I recommend close follow-up with primary care within the next 2 to 3 days to ensure resolution of symptoms.  Primary care referral was placed.  Supportive cares encouraged including rest, ice or heat packs to ear, and Tylenol or ibuprofen for pain.  Patient request pain medications however upon chart review, patient has history of drug-seeking behavior.  I do not feel comfortable sending patient home with opioids.  I recommend OTC medication and Tylenol was offered to patient.  Return precautions to the ED were discussed and patient was ready for discharge.    Diagnosis:    ICD-10-CM    1. Erysipelas  A46 Primary Care Referral    right ear         Discharge Medications:  Discharge Medication List as of 7/11/2023  5:24 PM      START taking these medications    Details   clindamycin (CLEOCIN) 150 MG capsule Take 3 capsules (450 mg) by mouth 3 times daily, Disp-63 capsule, R-0, Local Print           Scribe Disclosure:  Heidi CHEUNG am  serving as a scribe at 4:07 PM on 7/11/2023 to document services personally performed by Yamel Garcia PA-C based on my observations and the provider's statements to me.      7/11/2023   Yamel Garcia PA-C Steinbrueck, Emily, PA-C  07/11/23 7956

## 2023-07-11 NOTE — ED TRIAGE NOTES
"Pt complains of R ear pain and R ear swelling that started around 0300 this AM and woke pt up. Pt states \"it feels like there is a lump around my ear.\" No fevers. Pt does not recall trauma to ear.       "

## 2023-07-11 NOTE — DISCHARGE INSTRUCTIONS
You will be treated with antibiotics for a skin infection called erysipelas. Pleas take as prescribed and follow-up with primary care in 2-3 days for a recheck. You may take Tylenol or ibuprofen for pain. For any new or worsening symptoms, present back to ED.

## 2023-10-05 ENCOUNTER — HOSPITAL ENCOUNTER (EMERGENCY)
Facility: CLINIC | Age: 48
Discharge: HOME | End: 2023-10-05

## 2023-10-05 VITALS
RESPIRATION RATE: 18 BRPM | DIASTOLIC BLOOD PRESSURE: 98 MMHG | OXYGEN SATURATION: 97 % | HEART RATE: 63 BPM | TEMPERATURE: 97.5 F | SYSTOLIC BLOOD PRESSURE: 184 MMHG

## 2023-10-05 DIAGNOSIS — M54.40 BACK PAIN OF LUMBAR REGION WITH SCIATICA: Primary | ICD-10-CM

## 2023-10-05 DIAGNOSIS — R03.0 ELEVATED BLOOD PRESSURE READING WITHOUT DIAGNOSIS OF HYPERTENSION: ICD-10-CM

## 2023-10-05 PROCEDURE — 99283 EMERGENCY DEPT VISIT LOW MDM: CPT

## 2023-10-05 PROCEDURE — 250N000013 HC RX MED GY IP 250 OP 250 PS 637

## 2023-10-05 RX ORDER — IBUPROFEN 600 MG/1
600 TABLET, FILM COATED ORAL ONCE
Status: COMPLETED | OUTPATIENT
Start: 2023-10-05 | End: 2023-10-05

## 2023-10-05 RX ORDER — ACETAMINOPHEN 325 MG/1
650 TABLET ORAL ONCE
Status: COMPLETED | OUTPATIENT
Start: 2023-10-05 | End: 2023-10-05

## 2023-10-05 RX ORDER — METHOCARBAMOL 750 MG/1
750 TABLET, FILM COATED ORAL 4 TIMES DAILY PRN
Qty: 15 TABLET | Refills: 0 | Status: SHIPPED | OUTPATIENT
Start: 2023-10-05

## 2023-10-05 RX ORDER — METHOCARBAMOL 750 MG/1
750 TABLET, FILM COATED ORAL ONCE
Status: COMPLETED | OUTPATIENT
Start: 2023-10-05 | End: 2023-10-05

## 2023-10-05 RX ORDER — LIDOCAINE 4 G/G
2 PATCH TOPICAL
Status: DISCONTINUED | OUTPATIENT
Start: 2023-10-05 | End: 2023-10-05 | Stop reason: HOSPADM

## 2023-10-05 RX ADMIN — ACETAMINOPHEN 650 MG: 325 TABLET, FILM COATED ORAL at 16:36

## 2023-10-05 RX ADMIN — IBUPROFEN 600 MG: 600 TABLET ORAL at 16:36

## 2023-10-05 RX ADMIN — LIDOCAINE 2 PATCH: 4 PATCH TOPICAL at 16:37

## 2023-10-05 RX ADMIN — METHOCARBAMOL 750 MG: 750 TABLET ORAL at 16:36

## 2023-10-05 ASSESSMENT — ACTIVITIES OF DAILY LIVING (ADL): ADLS_ACUITY_SCORE: 33

## 2023-10-05 NOTE — ED PROVIDER NOTES
History     Chief Complaint:  Lower back pain       The history is provided by the patient.      Adriane Garrett is a 48 year old female with a history of asthma, bipoar, GERD, HLD, and migraines who presents to the ED for evaluation of back pain.  The patient states that 1 week ago she was rearranging things in a trailer home and doing a lot of cleaning.  Afterwards she experienced discomfort in her right lower back that occasionally radiates down the right leg.  She has been trying naproxen, Tylenol and ibuprofen but the pain has not resolved.  Her last dose of ibuprofen was at 10 this morning.  She does not have an established PCP.  She denies any falls or heavy impact.  She has no history of previous back surgeries.  There have been no fevers.  She denies IV drug abuse, diabetes, immunocompromise or cancer.  She has been walking okay but states that walking makes the pain worse.  She denies any urinary incontinence, urinary retention, bowel incontinence or numbness or tingling in the groin.  She states she has a safe ride home from a relative.  She denies any dysuria, urgency or frequency.  No chest pain, shortness of breath or abdominal pain associated with any of this.      Independent Historian:    Patient only    Review of External Notes:  Patient is a frequent flyer to the emergency department and this is her ninth visit this year.  She has a chronic abdominal pain care plan in place.    Medications:    Albuterol   Cleocin  Pepcid  Imitrex      Past Medical History:    Asthma  Bipolar disorder  Gastroesophageal reflux disease  Hyperlipidemia  IBS   Migraine  Obesity  Pancreatitis   Tobacco use disorder   Depression   Darier disease   Chronic abdominal pain   Endometriosis       Past Surgical History:    Cholecystectomy   Foot surgery   Hysterectomy          Physical Exam   Patient Vitals for the past 24 hrs:   BP Temp Temp src Pulse Resp SpO2   10/05/23 1710 (!) 184/98 -- -- -- -- --   10/05/23 1226 (!)  199/90 97.5  F (36.4  C) Temporal 63 18 97 %        Physical Exam  General: Nontoxic appearing middle aged female.  Elevated BMI.  HENT:   Head: Atraumatic.  Mouth/Throat: Oropharynx clear and moist.  Eyes: Conjunctive and EOM normal. PERRLA.  Neck: Normal ROM. No rigidity.  CV: Regular rate and rhythm. Normal S1, S2. No appreciable murmurs.  Resp: Lungs clear to auscultation bilaterally. Normal respiratory effort. No stridor or cough observed.  GI: Abdomen soft, non distended and nontender. No rebound or guarding.  MSK: Patient ambulatory.  She has no C-spine, T-spine or L-spine midline tenderness.  She is tender over the right lumbar paraspinal musculature.  5/5 strength with hip flexion and extension, knee flexion and extension and ankle plantarflexion and dorsiflexion.  Skin: Warm and dry.  No rash.  Neuro: Awake, alert, oriented x 3.  Sensation intact to light touch bilateral lower extremities.  Normal patellar reflexes.  Psych: Normal mood and affect.      Emergency Department Course       Emergency Department Course & Assessments:         Interventions:  Medications   Lidocaine (LIDOCARE) 4 % Patch 2 patch (2 patches Transdermal $Patch/Med Applied 10/5/23 1637)   acetaminophen (TYLENOL) tablet 650 mg (650 mg Oral $Given 10/5/23 1636)   ibuprofen (ADVIL/MOTRIN) tablet 600 mg (600 mg Oral $Given 10/5/23 1636)   methocarbamol (ROBAXIN) tablet 750 mg (750 mg Oral $Given 10/5/23 1636)        Assessments:  4:15pm I saw and evaluated the patient.  1726 I rechecked and updated the patient. Patient states pain levels improved to a 3/10. Patient is ready for discharge.       Social Determinants of Health affecting care:  Patient does not have established PCP and needs referral.     Disposition:  The patient was discharged to home.     Impression & Plan      Medical Decision Making:  Adriane is a 48-year-old female who came into the ED tonight for evaluation of right-sided back pain after she rearranged things in a  trailer home and did a lot of cleaning.  On arrival here she is afebrile and nontoxic-appearing.  There was no trauma and she has no midline tenderness to concern me for acute fracture.  There is no fever, history of IV drug abuse, immunocompromise, HIV or diabetes to make me think this represented spinal infection such as osteomyelitis, spinal epidural abscess or discitis.  Patient has no urinary retention, urinary incontinence, bowel incontinence or saddle paresthesia to concern me for acute cauda equina syndrome and there is no indication here for any emergent MRI.  Patient on exam has some right paraspinal musculature tenderness in the lumbar region.  I provided her with Tylenol, ibuprofen, Robaxin and lidocaine patches.  Her pain far improved to a 3/10 and she felt ready for discharge home.  We will try supportive cares at home and I have given her a referral for a primary care physician.  If back pain does not improve she also does have the number for [facility] for further evaluation, physical therapy and massage resources.  Return precautions were discussed at the time of discharge and provided to her in writing.  Of note, the patient's blood pressure was elevated at 184 systolic.  She does not have a formal diagnosis of hypertension.  She denied any severe headache, chest pain or shortness of breath.  She will follow-up with the PCP resource that I provided to her for recheck.  Return precautions discussed and an educational handout on lifestyle modifications to help control blood pressure was provided to her in writing.        Diagnosis:    ICD-10-CM    1. Back pain of lumbar region with sciatica  M54.40 Primary Care Referral      2. Elevated blood pressure reading without diagnosis of hypertension  R03.0            Discharge Medications:  New Prescriptions    METHOCARBAMOL (ROBAXIN) 750 MG TABLET    Take 1 tablet (750 mg) by mouth 4 times daily as needed for muscle spasms          Natasha Spears,  SHAVONNE  10/5/2023   Natasha Spears, Natasha Rodriguez PA-C  10/05/23 1738

## 2023-10-05 NOTE — ED TRIAGE NOTES
Pt arrives ambulatory for right/low back pain that began Tuesday/Wednesday when pt was rearranging things in her home. Reports no urinary symptoms. Naproxen, ibuprofen, and tylenol have not helped. Last ibuprofen taken 10am.

## 2023-10-05 NOTE — DISCHARGE INSTRUCTIONS
Discharge Instructions  Back Pain  You were seen today for back pain. Back pain can have many causes, but most will get better without surgery or other specific treatment. Sometimes there is a herniated ( slipped ) disc. We do not usually do MRI scans to look for these right away, since most herniated discs will get better on their own with time.  Today, we did not find any evidence that your back pain was caused by a serious condition. However, sometimes symptoms develop over time and cannot be found during an emergency visit, so it is very important that you follow up with your primary provider.  Generally, every Emergency Department visit should have a follow-up clinic visit with either a primary or a specialty clinic/provider. Please follow-up as instructed by your emergency provider today.    Return to the Emergency Department if:  You develop a fever with your back pain.   You have weakness or change in sensation in one or both legs.  You lose control of your bowels or bladder, or cannot empty your bladder (cannot pee).  Your pain gets much worse.     Follow-up with your provider:  Unless your pain has completely gone away, please make an appointment with your provider within one week. Most of the routine care for back pain is available in a clinic and not the Emergency Department. You may need further management of your back pain, such as more pain medication, imaging such as an X-ray or MRI, or physical therapy.    What can I do to help myself?  Remain Active -- People are often afraid that they will hurt their back further or delay recovery by remaining active, but this is one of the best things you can do for your back. In fact, staying in bed for a long time to rest is not recommended. Studies have shown that people with low back pain recover faster when they remain active. Movement helps to bring blood flow to the muscles and relieve muscle spasms as well as preventing loss of muscle strength.  Heat --  Using a heating pad can help with low back pain during the first few weeks. Do not sleep with a heating pad, as you can be burned.   Pain medications - You may take a pain medication such as Tylenol  (acetaminophen), Advil , Motrin  (ibuprofen) or Aleve  (naproxen).  If you were given a prescription for medicine here today, be sure to read all of the information (including the package insert) that comes with your prescription.  This will include important information about the medicine, its side effects, and any warnings that you need to know about.  The pharmacist who fills the prescription can provide more information and answer questions you may have about the medicine.  If you have questions or concerns that the pharmacist cannot address, please call or return to the Emergency Department.   Remember that you can always come back to the Emergency Department if you are not able to see your regular provider in the amount of time listed above, if you get any new symptoms, or if there is anything that worries you.    Discharge Instructions  Hypertension - High Blood Pressure    During you visit to the Emergency Department, your blood pressure was higher than the recommended blood pressure.  This may be related to stress, pain, medication or other temporary conditions. In these cases, your blood pressure may return to normal on its own. If you have a history of high blood pressure, you may need to have your provider adjust your medications. Sometimes, your high measurement here may indicate that you have developed high blood pressure that will stay high unless it is treated. As a general rule, high blood pressure causes problems over years rather than days, weeks, or months. So, while it is important to treat blood pressure, it is rarely important to treat blood pressure immediately. Occasionally we will begin a medication in the Emergency Department; more often we will recommend close follow-up for medications with a  primary doctor/clinic.    Generally, every Emergency Department visit should have a follow-up clinic visit with either a primary or a specialty clinic/provider. Please follow-up as instructed by your emergency provider today.    Return to the Emergency Department if you start to have:  A severe headache.  Chest pain.  Shortness of breath.  Weakness or numbness that affects one part of the body.  Confusion.  Vision changes.  Significant swelling of legs and/or eyes.  A reaction to any medication started in the Emergency Department.    What can I do to help myself?  Avoid alcohol.  Take any blood pressure medicine that you are prescribed.  Get a good night s sleep.  Lower your salt intake.  Exercise.  Lose weight.  Manage stress.  See your doctor regularly    If blood pressure medication was started in the Emergency Department:  The medicine may not have an immediate effect. The body and brain determine what blood pressure you have. The medicine s job is to retrain the body s  thermostat  to a lower blood pressure.  You will need to follow up with your provider to see how this medicine is working for you.  If you were given a prescription for medicine here today, be sure to read all of the information (including the package insert) that comes with your prescription.  This will include important information about the medicine, its side effects, and any warnings that you need to know about.  The pharmacist who fills the prescription can provide more information and answer questions you may have about the medicine.  If you have questions or concerns that the pharmacist cannot address, please call or return to the Emergency Department.   Remember that you can always come back to the Emergency Department if you are not able to see your regular provider in the amount of time listed above, if you get any new symptoms, or if there is anything that worries you.

## 2023-10-09 ENCOUNTER — HOSPITAL ENCOUNTER (EMERGENCY)
Facility: CLINIC | Age: 48
Discharge: HOME | End: 2023-10-09
Attending: EMERGENCY MEDICINE | Admitting: EMERGENCY MEDICINE

## 2023-10-09 ENCOUNTER — APPOINTMENT (OUTPATIENT)
Dept: GENERAL RADIOLOGY | Facility: CLINIC | Age: 48
End: 2023-10-09
Attending: EMERGENCY MEDICINE

## 2023-10-09 VITALS
RESPIRATION RATE: 18 BRPM | OXYGEN SATURATION: 100 % | TEMPERATURE: 97.5 F | DIASTOLIC BLOOD PRESSURE: 75 MMHG | SYSTOLIC BLOOD PRESSURE: 160 MMHG | HEART RATE: 43 BPM

## 2023-10-09 DIAGNOSIS — R07.9 CHEST PAIN, UNSPECIFIED TYPE: ICD-10-CM

## 2023-10-09 LAB
ANION GAP SERPL CALCULATED.3IONS-SCNC: 9 MMOL/L (ref 7–15)
ATRIAL RATE - MUSE: 49 BPM
BASO+EOS+MONOS # BLD AUTO: NORMAL 10*3/UL
BASO+EOS+MONOS NFR BLD AUTO: NORMAL %
BASOPHILS # BLD AUTO: 0 10E3/UL (ref 0–0.2)
BASOPHILS NFR BLD AUTO: 1 %
BUN SERPL-MCNC: 10 MG/DL (ref 6–20)
CALCIUM SERPL-MCNC: 9.3 MG/DL (ref 8.6–10)
CHLORIDE SERPL-SCNC: 103 MMOL/L (ref 98–107)
CREAT SERPL-MCNC: 0.56 MG/DL (ref 0.51–0.95)
D DIMER PPP FEU-MCNC: 0.37 UG/ML FEU (ref 0–0.5)
DEPRECATED HCO3 PLAS-SCNC: 28 MMOL/L (ref 22–29)
DIASTOLIC BLOOD PRESSURE - MUSE: NORMAL MMHG
EGFRCR SERPLBLD CKD-EPI 2021: >90 ML/MIN/1.73M2
EOSINOPHIL # BLD AUTO: 0.1 10E3/UL (ref 0–0.7)
EOSINOPHIL NFR BLD AUTO: 1 %
ERYTHROCYTE [DISTWIDTH] IN BLOOD BY AUTOMATED COUNT: 13.3 % (ref 10–15)
GLUCOSE SERPL-MCNC: 127 MG/DL (ref 70–99)
HCG SERPL QL: NEGATIVE
HCT VFR BLD AUTO: 40.4 % (ref 35–47)
HGB BLD-MCNC: 13 G/DL (ref 11.7–15.7)
HOLD SPECIMEN: NORMAL
HOLD SPECIMEN: NORMAL
IMM GRANULOCYTES # BLD: 0 10E3/UL
IMM GRANULOCYTES NFR BLD: 0 %
INTERPRETATION ECG - MUSE: NORMAL
LIPASE SERPL-CCNC: 20 U/L (ref 13–60)
LYMPHOCYTES # BLD AUTO: 3.2 10E3/UL (ref 0.8–5.3)
LYMPHOCYTES NFR BLD AUTO: 48 %
MCH RBC QN AUTO: 30.2 PG (ref 26.5–33)
MCHC RBC AUTO-ENTMCNC: 32.2 G/DL (ref 31.5–36.5)
MCV RBC AUTO: 94 FL (ref 78–100)
MONOCYTES # BLD AUTO: 0.5 10E3/UL (ref 0–1.3)
MONOCYTES NFR BLD AUTO: 8 %
NEUTROPHILS # BLD AUTO: 2.7 10E3/UL (ref 1.6–8.3)
NEUTROPHILS NFR BLD AUTO: 42 %
NRBC # BLD AUTO: 0 10E3/UL
NRBC BLD AUTO-RTO: 0 /100
P AXIS - MUSE: 41 DEGREES
PLATELET # BLD AUTO: 366 10E3/UL (ref 150–450)
POTASSIUM SERPL-SCNC: 3.6 MMOL/L (ref 3.4–5.3)
PR INTERVAL - MUSE: 170 MS
QRS DURATION - MUSE: 114 MS
QT - MUSE: 454 MS
QTC - MUSE: 410 MS
R AXIS - MUSE: -3 DEGREES
RBC # BLD AUTO: 4.31 10E6/UL (ref 3.8–5.2)
SODIUM SERPL-SCNC: 140 MMOL/L (ref 135–145)
SYSTOLIC BLOOD PRESSURE - MUSE: NORMAL MMHG
T AXIS - MUSE: 49 DEGREES
TROPONIN T SERPL HS-MCNC: <6 NG/L
TROPONIN T SERPL HS-MCNC: <6 NG/L
VENTRICULAR RATE- MUSE: 49 BPM
WBC # BLD AUTO: 6.5 10E3/UL (ref 4–11)

## 2023-10-09 PROCEDURE — 99285 EMERGENCY DEPT VISIT HI MDM: CPT | Mod: 25

## 2023-10-09 PROCEDURE — 250N000013 HC RX MED GY IP 250 OP 250 PS 637: Performed by: EMERGENCY MEDICINE

## 2023-10-09 PROCEDURE — 84703 CHORIONIC GONADOTROPIN ASSAY: CPT | Performed by: EMERGENCY MEDICINE

## 2023-10-09 PROCEDURE — 80048 BASIC METABOLIC PNL TOTAL CA: CPT | Performed by: EMERGENCY MEDICINE

## 2023-10-09 PROCEDURE — 93005 ELECTROCARDIOGRAM TRACING: CPT

## 2023-10-09 PROCEDURE — 85379 FIBRIN DEGRADATION QUANT: CPT | Performed by: EMERGENCY MEDICINE

## 2023-10-09 PROCEDURE — 85025 COMPLETE CBC W/AUTO DIFF WBC: CPT | Performed by: EMERGENCY MEDICINE

## 2023-10-09 PROCEDURE — 84484 ASSAY OF TROPONIN QUANT: CPT | Performed by: EMERGENCY MEDICINE

## 2023-10-09 PROCEDURE — 83690 ASSAY OF LIPASE: CPT | Performed by: EMERGENCY MEDICINE

## 2023-10-09 PROCEDURE — 71046 X-RAY EXAM CHEST 2 VIEWS: CPT

## 2023-10-09 PROCEDURE — 36415 COLL VENOUS BLD VENIPUNCTURE: CPT | Performed by: EMERGENCY MEDICINE

## 2023-10-09 RX ORDER — ACETAMINOPHEN 500 MG
1000 TABLET ORAL ONCE
Status: COMPLETED | OUTPATIENT
Start: 2023-10-09 | End: 2023-10-09

## 2023-10-09 RX ADMIN — ACETAMINOPHEN 1000 MG: 500 TABLET, FILM COATED ORAL at 05:46

## 2023-10-09 ASSESSMENT — ACTIVITIES OF DAILY LIVING (ADL)
ADLS_ACUITY_SCORE: 35
ADLS_ACUITY_SCORE: 35

## 2023-10-09 NOTE — ED TRIAGE NOTES
Pt presents to triage with c/o chest pain since 0200. Woke pt from sleep. Denies SOB. Denies cardiac history.

## 2023-10-09 NOTE — ED PROVIDER NOTES
History     Chief Complaint:  Chest Pain       HPI   Adriane Garrett is a 48 year old female with history of asthma, bipolar disorder, pancreatitis, nonalcoholic liver disease, GERD who presents to the ER for evaluation of chest pain.  Patient reports left lower chest discomfort since around 2 to 3 AM.  The pain awoke her from sleep.  It is a pressure sensation that is worse with deep breathing.  Sometimes radiates to the left shoulder.  No other clear modifying or precipitating factors.  No fever, cough, vomiting, diarrhea, abdominal pain. Patient denies immobilization for >3 days or surgery within the previous 4 weeks, history of DVT or PE, hemoptysis, malignancy with treatment within previous 6 months or palliative therapy, or exogenous estrogen use.     Review of External Notes:     Care Coordination Note   EMERGENCY CARE PLAN     At each Emergency Department visit, we will evaluate this patient to determine if an emergency medical condition is present.      Brief History of Condition: Adriane Garrett has a history of chronic/recurrent abdominal pain who presents for multiple site pain concerns (abdominal, headache, and chest pain most frequently).  She is s/p cholecystectomy and hysterectomy.  She presents to multiple health systems and has not followed with a PCP consistently.      ED care plan:   Chronic/Recurrent Chest & Abdominal Pain:   Patient has previously received multiple imaging studies without identification of acute pathology.  The department s chronic pain policy will apply.   No IV/IM opioids provided nor prescribed opioids.  Non-opiate pain management will be offered. Orally administered opiates may be provided at the clinician s discretion.   Advanced Imaging Utilization: Frequent radiographic imaging represents a significant health risk to the patient. Recommend imaging only if acute exam findings, significant laboratory abnormality, or significant concern for new or acute finding.   Recurrent  ED Utilization: Consider consultation with ED Care Coordinator if available to assist with outpatient follow-up and to encourage more consistent primary care.   Relevant Medical History (at time Care Plan initiated):  History of chronic pain, IBS, hypertension, migraine headaches, bipolar disorder, and a report of pancreatitis.  Relevant Surgical History (at time Care Plan initiated):  Cholecystectomy, Hysterectomy  Past imaging:  CT:  7 in the 12 months prior to initiation of care plan.   MRI: 0 in the 12 months prior to initiation of care plan.   Ultrasound: 2 in the 12 months prior to initiation of care plan.   [facility] ED visits in 12 months prior to initiation of Care Plan: 12 (Has received care in various [facility], [place] [facility] [facility] in past 12 months)  [facility] Hospital Admissions in 12 months prior to initiation of Care Plan: 0   General Concepts:  Chronic Pain  The department s chronic pain policy will apply. This states that no parenteral (intravenous/IV or intramuscular/IM) opiates will be given in the Emergency Department and no prescriptions for opiates will be provided. It is suggested that all opiate prescriptions come from a single provider, usually the primary physician or a pain clinic. This applies to chronic pain including acute exacerbations of chronic pain and new sites of pain in those with migratory chronic pain syndromes. Non-opiate pain management will be offered. Orally administered opiates may be provided at the clinician s discretion.  Advanced Imaging Utilization: Frequent radiographic imaging represents a significant health risk to the patient. Recommend imaging only if acute exam findings, significant laboratory abnormality, or significant concern for new or acute finding.   PCP:  None currently established     Care Coordination: Please involve care coordinator if available to assist with follow-up and discharge planning.     Follow up plan  after an ED visit:  Encourage patient to follow-up closely with primary care provider and assist with providing resources or involving care coordinator as able.        Initiated:10/1/21      Medications:    albuterol (PROAIR HFA/PROVENTIL HFA/VENTOLIN HFA) 108 (90 Base) MCG/ACT inhaler  clindamycin (CLEOCIN) 150 MG capsule  diclofenac (VOLTAREN) 1 % topical gel  famotidine (PEPCID) 20 MG tablet  methocarbamol (ROBAXIN) 750 MG tablet  SUMAtriptan (IMITREX) 25 MG tablet        Past Medical History:    Past Medical History:   Diagnosis Date    Asthma     Bipolar disorder     Gastroesophageal reflux disease     Hyperlipidemia     IBS (irritable bowel syndrome)     Migraine     Obesity        Past Surgical History:    Past Surgical History:   Procedure Laterality Date    CHOLECYSTECTOMY      FOOT SURGERY Right     HYSTERECTOMY TOTAL ABDOMINAL, BILATERAL SALPINGO-OOPHORECTOMY, COMBINED      multiple surgeries        Physical Exam   Patient Vitals for the past 24 hrs:   BP Temp Pulse Resp SpO2   10/09/23 0638 -- -- -- -- 100 %   10/09/23 0633 -- -- -- -- 100 %   10/09/23 0632 -- -- -- -- 100 %   10/09/23 0631 -- -- -- -- 100 %   10/09/23 0630 (!) 160/75 -- (!) 43 -- 100 %   10/09/23 0606 -- -- -- -- 99 %   10/09/23 0605 -- -- -- -- 98 %   10/09/23 0604 -- -- -- -- 100 %   10/09/23 0603 -- -- -- -- 100 %   10/09/23 0602 -- -- -- -- 99 %   10/09/23 0601 -- -- -- -- 100 %   10/09/23 0600 (!) 161/79 -- (!) 45 -- 100 %   10/09/23 0440 (!) 140/78 97.5  F (36.4  C) 51 18 100 %        Physical Exam  VS: Reviewed per above  HENT: Mucous membranes moist  EYES: sclera anicteric  CV: Rate as noted,  regular rhythm.   RESP: Effort normal. Breath sounds are normal bilaterally.  GI: no tenderness/rebound/guarding, not distended.  NEURO: Alert, moving all extremities  MSK: No deformity of the extremities  SKIN: Warm and dry    Emergency Department Course   ECG  ECG results from 10/09/23   EKG 12 lead     Value    Systolic Blood Pressure      Diastolic Blood Pressure     Ventricular Rate 49    Atrial Rate 49    AK Interval 170    QRS Duration 114        QTc 410    P Axis 41    R AXIS -3    T Axis 49    Interpretation ECG      Sinus bradycardia with sinus arrhythmia  Otherwise normal ECG  When compared with ECG of 14-DEC-2022 15:10,  No significant change was found  Confirmed by - EMERGENCY ROOM, PHYSICIAN ([id number]),  ROSIO CARREON ([id number]) on 10/9/2023 7:06:39 AM           Imaging:  XR CHEST 2 VIEWS 10/9/2023 7:10 AM     HISTORY: chest pain     COMPARISON: 12/15/2021     FINDINGS: No consolidation. No pleural effusions or pneumothorax.  Normal cardiac size.     THOMAS CAI MD   Report per radiology    Laboratory:  Labs Ordered and Resulted from Time of ED Arrival to Time of ED Departure   BASIC METABOLIC PANEL - Abnormal       Result Value    Sodium 140      Potassium 3.6      Chloride 103      Carbon Dioxide (CO2) 28      Anion Gap 9      Urea Nitrogen 10.0      Creatinine 0.56      GFR Estimate >90      Calcium 9.3      Glucose 127 (*)    TROPONIN T, HIGH SENSITIVITY - Normal    Troponin T, High Sensitivity <6     HCG QUALITATIVE PREGNANCY - Normal    hCG Serum Qualitative Negative     TROPONIN T, HIGH SENSITIVITY - Normal    Troponin T, High Sensitivity <6     D DIMER QUANTITATIVE - Normal    D-Dimer Quantitative 0.37     LIPASE - Normal    Lipase 20     CBC WITH PLATELETS AND DIFFERENTIAL    WBC Count 6.5      RBC Count 4.31      Hemoglobin 13.0      Hematocrit 40.4      MCV 94      MCH 30.2      MCHC 32.2      RDW 13.3      Platelet Count 366      % Neutrophils 42      % Lymphocytes 48      % Monocytes 8      Mids % (Monos, Eos, Basos)        % Eosinophils 1      % Basophils 1      % Immature Granulocytes 0      NRBCs per 100 WBC 0      Absolute Neutrophils 2.7      Absolute Lymphocytes 3.2      Absolute Monocytes 0.5      Mids Abs (Monos, Eos, Basos)        Absolute Eosinophils 0.1      Absolute Basophils 0.0      Absolute  Immature Granulocytes 0.0      Absolute NRBCs 0.0        Emergency Department Course & Assessments:           Interventions:  Medications   acetaminophen (TYLENOL) tablet 1,000 mg (1,000 mg Oral $Given 10/9/23 0546)        Disposition:  The patient was discharged to home.     Impression & Plan        Medical Decision Making:  Adriane Garrett presented to the ER with chest pain. CXR did not reveal wide mediastinum and nature of pain not c/w aortic dissection. No radiographic evidence of pneumothorax nor PNA either. Hx and lack of pneumomediastinum on CXR make boerhaave's syndrome unlikely. Based on normal D-dimer and lower pretest probability, PE was also deemed unlikely. ECG did not show signs of STEMI nor other specific signs of ischemia. Serial troponin testing was negative, thus no signs of acute MI. Hx is also not consistent with unstable angina. Nature of pain in conjunction with reassuring ECG and negative troponin makes pericarditis and myocarditis unlikely as well.  No evidence pancreatitis.  Abdominal exam benign.  Recommended close primary care follow-up.  Discussed trialing anti-inflammatories such as ibuprofen to see if this helps.  Return precautions discussed prior to discharge.    Diagnosis:    ICD-10-CM    1. Chest pain, unspecified type  R07.9            Discharge Medications:  Discharge Medication List as of 10/9/2023  7:43 AM            Hal Rivera MD  10/09/23 1984

## 2023-11-07 ENCOUNTER — APPOINTMENT (OUTPATIENT)
Dept: GENERAL RADIOLOGY | Facility: CLINIC | Age: 48
End: 2023-11-07
Attending: EMERGENCY MEDICINE

## 2023-11-07 ENCOUNTER — HOSPITAL ENCOUNTER (EMERGENCY)
Facility: CLINIC | Age: 48
Discharge: HOME | End: 2023-11-07
Attending: STUDENT IN AN ORGANIZED HEALTH CARE EDUCATION/TRAINING PROGRAM | Admitting: STUDENT IN AN ORGANIZED HEALTH CARE EDUCATION/TRAINING PROGRAM

## 2023-11-07 VITALS
RESPIRATION RATE: 14 BRPM | DIASTOLIC BLOOD PRESSURE: 74 MMHG | SYSTOLIC BLOOD PRESSURE: 138 MMHG | TEMPERATURE: 97.5 F | HEART RATE: 61 BPM | OXYGEN SATURATION: 100 %

## 2023-11-07 DIAGNOSIS — R22.42 FOOT MASS, LEFT: ICD-10-CM

## 2023-11-07 PROCEDURE — 73630 X-RAY EXAM OF FOOT: CPT | Mod: LT

## 2023-11-07 PROCEDURE — 99284 EMERGENCY DEPT VISIT MOD MDM: CPT

## 2023-11-07 ASSESSMENT — ACTIVITIES OF DAILY LIVING (ADL)
ADLS_ACUITY_SCORE: 35
ADLS_ACUITY_SCORE: 35

## 2023-11-07 NOTE — ED TRIAGE NOTES
Presents ambulatory to triage with c/o pain/mass on the bottom of L foot. Patient stated she first noticed the mass on Sunday and that she previously had one on the bottom of her R foot that had to be surgically removed. Mass is tender to palpation and about 2 cm in length. CMS intact.

## 2023-11-08 NOTE — DISCHARGE INSTRUCTIONS
It is okay to bear weight on your foot.  Use the walker and wrap/support shoe as needed.  Call orthopedics to make a follow-up.  You may need more advanced imaging.  If the swelling gets bigger, you notice redness on your skin, numbness or weakness in your toes, return to the ER.  You can take Tylenol or Motrin for pain.

## 2023-11-08 NOTE — ED PROVIDER NOTES
History     Chief Complaint:  Foot Pain     The history is provided by the patient.     Adriane Garrett is a 48 year old female with a history of hyperlipidemia, pancreatitis,and hypertension who presents to the emergency department for foot pain. The patient states that since Sunday, she noticed she has a mass on the bottom side of her left foot. She reports that she previously had a mass on her right foot that was surgically removed. She notes that she is experiencing foot pain due to this mass and describes her pain as a sharp and stabbing pain. She adds that her pain is exacerbated upon standing and ambulating on this foot. She also experiences pain with plantarflexion and dorsiflexion. Denies new shoes or injury to the foot. Denies new activities. Denies fever or rash. Denies any travel into wooded areas. Denies numbness, tingling, weakness in her toes. Denies ankle pain.    Independent Historian:   None - Patient Only    Review of External Notes:   None     Medications:    Albuterol  Clindamycin  Famotidine  Methocarbamol  Sumatriptan    Past Medical History:    Asthma  Bipolar 1 disorder  GERD  Hypertension  Hyperlipidemia  IBS  Migraines  Obesity  Pancreatitis    Past Surgical History:    Cholecystectomy  Foot surgery  Tubal ligation  Salpingo-oophorectomy    Physical Exam   Patient Vitals for the past 24 hrs:   BP Temp Temp src Pulse Resp SpO2   11/07/23 2130 138/74 -- -- 61 14 100 %   11/07/23 1702 -- 97.5  F (36.4  C) Temporal 53 18 98 %      Physical Exam  General: Awake, alert, in no acute distress   HEENT: Atraumatic   EOM normal   External ears normal   Trachea midline  Neck: Supple, normal ROM  CV: Regular rate, regular rhythm   No murmur   No lower extremity edema  2+ radial and DP pulses  PULM: Breath sounds normal bilaterally  No wheezes or rales  ABD: Soft, non-tender, non-distended  Normal bowel sounds   No rebound or guarding   MSK: 2 cm x 1 cm firm, tender nodule left plantar foot without  erythema. Pain with toe extension. No pain with ankle flexion or extension  NEURO: Alert, no focal deficits  Skin: Warm, dry and intact    Emergency Department Course     Imaging:  Foot XR, G/E 3 views, left   Final Result   IMPRESSION: No acute displaced left foot fracture or dislocation is identified. Joint spaces are unchanged. No localizing left foot soft tissue swelling. Unchanged small heel spur with distal Achilles insertional enthesophyte. No interval change in the    radiographic appearance of the left foot. Left foot MRI could further assess for mass.         Read by radiologist.    Emergency Department Course & Assessments:    Interventions:  None     Assessments:  1941 I obtained history and examined the patient as noted above.   2100 I discussed findings and discharge with the patient. All questions answered.     Independent Interpretation (X-rays, CTs, rhythm strip):  I independently interpreted the patient's foot X-ray. No bony involvement and no foreign bodies.    Consultations/Discussion of Management or Tests:  None     Social Determinants of Health affecting care:   None    Disposition:  The patient was discharged to home.     Impression & Plan      Medical Decision Making:  Vitals WNL on arrival  This patient presents with atraumatic bump on the plantar aspect of her left foot.  She has history of this in the opposite foot in childhood that was surgically removed.  This bump has been present for several months and only became painful over the past few days  Neurovascular intact distally  X-ray does not show any evidence of bony involvement  Bedside ultrasound performed by myself shows a 2 x 1 cm circular lesion.  There is no Doppler to suggest atypical aneurysm nor evidence of foreign body.  The lesion is firm, nonfluctuant.  There is no overlying cellulitis  She tolerates walking on it with walker and Ortho shoe.  She can weight-bear as tolerated.  She needs orthopedic follow-up -give her the  number for [facility] orthopedics  She can take Tylenol or Motrin for pain  If the lesion should grow rapidly in size, she developed redness around her foot, neurological compromise, she should return to the ER.  She voices understanding and agreement of this plan    Diagnosis:    ICD-10-CM    1. Foot mass, left  R22.42          Scribe Disclosure:  I, Roderick Griffith, am serving as a scribe at 7:51 PM on 11/7/2023 to document services personally performed by Romy Walters DO based on my observations and the provider's statements to me.     11/7/2023   Romy Walters DO Pappas Richter, Ellen, DO  11/08/23 0014

## 2023-12-29 ENCOUNTER — HOSPITAL ENCOUNTER (EMERGENCY)
Facility: CLINIC | Age: 48
Discharge: HOME | End: 2023-12-29
Attending: EMERGENCY MEDICINE | Admitting: EMERGENCY MEDICINE

## 2023-12-29 VITALS
RESPIRATION RATE: 22 BRPM | DIASTOLIC BLOOD PRESSURE: 81 MMHG | OXYGEN SATURATION: 99 % | TEMPERATURE: 97.6 F | HEART RATE: 47 BPM | SYSTOLIC BLOOD PRESSURE: 149 MMHG

## 2023-12-29 DIAGNOSIS — I10 HYPERTENSION, UNSPECIFIED TYPE: ICD-10-CM

## 2023-12-29 DIAGNOSIS — R10.9 CHRONIC ABDOMINAL PAIN: Primary | ICD-10-CM

## 2023-12-29 DIAGNOSIS — R11.2 NAUSEA AND VOMITING, UNSPECIFIED VOMITING TYPE: ICD-10-CM

## 2023-12-29 DIAGNOSIS — G89.29 CHRONIC ABDOMINAL PAIN: Primary | ICD-10-CM

## 2023-12-29 LAB
ALBUMIN SERPL BCG-MCNC: 4.3 G/DL (ref 3.5–5.2)
ALBUMIN UR-MCNC: NEGATIVE MG/DL
ALP SERPL-CCNC: 94 U/L (ref 40–150)
ALT SERPL W P-5'-P-CCNC: 30 U/L (ref 0–50)
ANION GAP SERPL CALCULATED.3IONS-SCNC: 11 MMOL/L (ref 7–15)
APPEARANCE UR: CLEAR
AST SERPL W P-5'-P-CCNC: 21 U/L (ref 0–45)
BACTERIA #/AREA URNS HPF: ABNORMAL /HPF
BASOPHILS # BLD AUTO: 0 10E3/UL (ref 0–0.2)
BASOPHILS NFR BLD AUTO: 1 %
BILIRUB SERPL-MCNC: 0.2 MG/DL
BILIRUB UR QL STRIP: NEGATIVE
BUN SERPL-MCNC: 8.8 MG/DL (ref 6–20)
CALCIUM SERPL-MCNC: 8.7 MG/DL (ref 8.6–10)
CHLORIDE SERPL-SCNC: 102 MMOL/L (ref 98–107)
COLOR UR AUTO: ABNORMAL
CREAT SERPL-MCNC: 0.6 MG/DL (ref 0.51–0.95)
DEPRECATED HCO3 PLAS-SCNC: 26 MMOL/L (ref 22–29)
EGFRCR SERPLBLD CKD-EPI 2021: >90 ML/MIN/1.73M2
EOSINOPHIL # BLD AUTO: 0.1 10E3/UL (ref 0–0.7)
EOSINOPHIL NFR BLD AUTO: 1 %
ERYTHROCYTE [DISTWIDTH] IN BLOOD BY AUTOMATED COUNT: 13.1 % (ref 10–15)
GLUCOSE SERPL-MCNC: 134 MG/DL (ref 70–99)
GLUCOSE UR STRIP-MCNC: 50 MG/DL
HCT VFR BLD AUTO: 41.1 % (ref 35–47)
HGB BLD-MCNC: 13.3 G/DL (ref 11.7–15.7)
HGB UR QL STRIP: NEGATIVE
HOLD SPECIMEN: NORMAL
HOLD SPECIMEN: NORMAL
IMM GRANULOCYTES # BLD: 0 10E3/UL
IMM GRANULOCYTES NFR BLD: 0 %
KETONES UR STRIP-MCNC: NEGATIVE MG/DL
LEUKOCYTE ESTERASE UR QL STRIP: NEGATIVE
LIPASE SERPL-CCNC: 18 U/L (ref 13–60)
LYMPHOCYTES # BLD AUTO: 3.2 10E3/UL (ref 0.8–5.3)
LYMPHOCYTES NFR BLD AUTO: 45 %
MCH RBC QN AUTO: 29.7 PG (ref 26.5–33)
MCHC RBC AUTO-ENTMCNC: 32.4 G/DL (ref 31.5–36.5)
MCV RBC AUTO: 92 FL (ref 78–100)
MONOCYTES # BLD AUTO: 0.5 10E3/UL (ref 0–1.3)
MONOCYTES NFR BLD AUTO: 6 %
MUCOUS THREADS #/AREA URNS LPF: PRESENT /LPF
NEUTROPHILS # BLD AUTO: 3.3 10E3/UL (ref 1.6–8.3)
NEUTROPHILS NFR BLD AUTO: 47 %
NITRATE UR QL: NEGATIVE
NRBC # BLD AUTO: 0 10E3/UL
NRBC BLD AUTO-RTO: 0 /100
PH UR STRIP: 5.5 [PH] (ref 5–7)
PLATELET # BLD AUTO: 357 10E3/UL (ref 150–450)
POTASSIUM SERPL-SCNC: 3.9 MMOL/L (ref 3.4–5.3)
PROT SERPL-MCNC: 7.3 G/DL (ref 6.4–8.3)
RBC # BLD AUTO: 4.48 10E6/UL (ref 3.8–5.2)
RBC URINE: 1 /HPF
SODIUM SERPL-SCNC: 139 MMOL/L (ref 135–145)
SP GR UR STRIP: 1.02 (ref 1–1.03)
SQUAMOUS EPITHELIAL: 1 /HPF
TROPONIN T SERPL HS-MCNC: <6 NG/L
UROBILINOGEN UR STRIP-MCNC: NORMAL MG/DL
WBC # BLD AUTO: 7.1 10E3/UL (ref 4–11)
WBC URINE: <1 /HPF

## 2023-12-29 PROCEDURE — 85004 AUTOMATED DIFF WBC COUNT: CPT | Performed by: STUDENT IN AN ORGANIZED HEALTH CARE EDUCATION/TRAINING PROGRAM

## 2023-12-29 PROCEDURE — 84484 ASSAY OF TROPONIN QUANT: CPT | Performed by: EMERGENCY MEDICINE

## 2023-12-29 PROCEDURE — 36415 COLL VENOUS BLD VENIPUNCTURE: CPT | Performed by: STUDENT IN AN ORGANIZED HEALTH CARE EDUCATION/TRAINING PROGRAM

## 2023-12-29 PROCEDURE — 80053 COMPREHEN METABOLIC PANEL: CPT | Performed by: EMERGENCY MEDICINE

## 2023-12-29 PROCEDURE — 250N000013 HC RX MED GY IP 250 OP 250 PS 637: Performed by: EMERGENCY MEDICINE

## 2023-12-29 PROCEDURE — 81001 URINALYSIS AUTO W/SCOPE: CPT | Performed by: EMERGENCY MEDICINE

## 2023-12-29 PROCEDURE — 93005 ELECTROCARDIOGRAM TRACING: CPT | Mod: RTG

## 2023-12-29 PROCEDURE — 83690 ASSAY OF LIPASE: CPT | Performed by: STUDENT IN AN ORGANIZED HEALTH CARE EDUCATION/TRAINING PROGRAM

## 2023-12-29 PROCEDURE — 83690 ASSAY OF LIPASE: CPT | Performed by: EMERGENCY MEDICINE

## 2023-12-29 PROCEDURE — 80053 COMPREHEN METABOLIC PANEL: CPT | Performed by: STUDENT IN AN ORGANIZED HEALTH CARE EDUCATION/TRAINING PROGRAM

## 2023-12-29 PROCEDURE — 85025 COMPLETE CBC W/AUTO DIFF WBC: CPT | Performed by: EMERGENCY MEDICINE

## 2023-12-29 PROCEDURE — 99284 EMERGENCY DEPT VISIT MOD MDM: CPT

## 2023-12-29 RX ORDER — SUCRALFATE ORAL 1 G/10ML
1 SUSPENSION ORAL ONCE
Status: COMPLETED | OUTPATIENT
Start: 2023-12-29 | End: 2023-12-29

## 2023-12-29 RX ORDER — DIPHENHYDRAMINE HYDROCHLORIDE 50 MG/ML
25 INJECTION INTRAMUSCULAR; INTRAVENOUS ONCE
Status: DISCONTINUED | OUTPATIENT
Start: 2023-12-29 | End: 2023-12-29

## 2023-12-29 RX ORDER — SUCRALFATE ORAL 1 G/10ML
1 SUSPENSION ORAL 4 TIMES DAILY PRN
Qty: 280 ML | Refills: 0 | Status: SHIPPED | OUTPATIENT
Start: 2023-12-29 | End: 2024-01-05

## 2023-12-29 RX ORDER — FAMOTIDINE 10 MG
10 TABLET ORAL ONCE
Status: COMPLETED | OUTPATIENT
Start: 2023-12-29 | End: 2023-12-29

## 2023-12-29 RX ORDER — MAGNESIUM HYDROXIDE/ALUMINUM HYDROXICE/SIMETHICONE 120; 1200; 1200 MG/30ML; MG/30ML; MG/30ML
15 SUSPENSION ORAL ONCE
Status: COMPLETED | OUTPATIENT
Start: 2023-12-29 | End: 2023-12-29

## 2023-12-29 RX ORDER — FAMOTIDINE 10 MG
10 TABLET ORAL 2 TIMES DAILY
Qty: 60 TABLET | Refills: 0 | Status: SHIPPED | OUTPATIENT
Start: 2023-12-29 | End: 2024-01-28

## 2023-12-29 RX ORDER — METOCLOPRAMIDE HYDROCHLORIDE 5 MG/ML
10 INJECTION INTRAMUSCULAR; INTRAVENOUS ONCE
Status: DISCONTINUED | OUTPATIENT
Start: 2023-12-29 | End: 2023-12-29

## 2023-12-29 RX ORDER — DICYCLOMINE HCL 20 MG
20 TABLET ORAL 4 TIMES DAILY PRN
Qty: 20 TABLET | Refills: 0 | Status: SHIPPED | OUTPATIENT
Start: 2023-12-29 | End: 2024-01-08

## 2023-12-29 RX ADMIN — SUCRALFATE 1 G: 1 SUSPENSION ORAL at 18:23

## 2023-12-29 RX ADMIN — FAMOTIDINE 10 MG: 10 TABLET, FILM COATED ORAL at 18:23

## 2023-12-29 RX ADMIN — ALUMINUM HYDROXIDE, MAGNESIUM HYDROXIDE, AND SIMETHICONE 15 ML: 200; 200; 20 SUSPENSION ORAL at 18:23

## 2023-12-29 ASSESSMENT — ENCOUNTER SYMPTOMS
ABDOMINAL PAIN: 1
HEADACHES: 0
CHILLS: 0
RHINORRHEA: 0
DIZZINESS: 0
BLOOD IN STOOL: 0
FEVER: 0
DYSURIA: 0
HEMATURIA: 0
COUGH: 0
NECK PAIN: 0
SHORTNESS OF BREATH: 0
NECK STIFFNESS: 0
VOMITING: 1
DIARRHEA: 0
NAUSEA: 1

## 2023-12-29 ASSESSMENT — ACTIVITIES OF DAILY LIVING (ADL)
ADLS_ACUITY_SCORE: 35
ADLS_ACUITY_SCORE: 35
ADLS_ACUITY_SCORE: 33

## 2023-12-29 NOTE — ED PROVIDER NOTES
History     Chief Complaint:  Abdominal Pain       HPI   Adriane Garrett is a 48 year old female presents to the emergency department for frequent abdominal pain.  Patient states that her symptoms started around 7-8 o'clock this morning.  Patient states that the pain feels similar to her prior cholecystitis requiring cholecystectomy and similar to her prior multiple abdominal pains in which the care plan was originated.  Patient states that the pain is sharp in nature with occasional radiation to the back, but this is similar to her prior presentation.  Patient states that the pain is intermittent and fluctuating and worse with eating or drinking.  Patient states that the pain is especially worsened when she eats fatty diet.  Patient has associated nausea and and nonbloody vomitus this morning.  Patient states that her emesis was a very pale yellow this morning.  Patient denies any fever or chills.  No black stools or bloody stools.  Normal bowel movements.  She states however that she regularly takes ibuprofen roughly 200 mg tablets daily.  She reports that she has been told in the past before for potential peptic ulcer disease/gastritis, but she has never seen a gastroenterologist or had an EGD done in the past.    Review of Systems   Constitutional:  Negative for chills and fever.   HENT:  Negative for congestion and rhinorrhea.    Respiratory:  Negative for cough and shortness of breath.    Cardiovascular:  Negative for chest pain.   Gastrointestinal:  Positive for abdominal pain, nausea and vomiting. Negative for blood in stool and diarrhea.   Genitourinary:  Negative for dysuria and hematuria.   Musculoskeletal:  Negative for neck pain and neck stiffness.   Neurological:  Negative for dizziness and headaches.     Independent Historian:   None - Patient Only    Review of External Notes:   Emergency Care Plan initiated on 10/1/21 and patient's multiple prior ED visits for similar complaints    Medications:     dicyclomine (BENTYL) 20 MG tablet  famotidine (PEPCID) 10 MG tablet  sucralfate (CARAFATE) 1 GM/10ML suspension  albuterol (PROAIR HFA/PROVENTIL HFA/VENTOLIN HFA) 108 (90 Base) MCG/ACT inhaler  clindamycin (CLEOCIN) 150 MG capsule  diclofenac (VOLTAREN) 1 % topical gel  famotidine (PEPCID) 20 MG tablet  methocarbamol (ROBAXIN) 750 MG tablet  SUMAtriptan (IMITREX) 25 MG tablet        Past Medical History:    Past Medical History:   Diagnosis Date    Asthma     Bipolar disorder     Gastroesophageal reflux disease     Hyperlipidemia     IBS (irritable bowel syndrome)     Migraine     Obesity        Past Surgical History:    Past Surgical History:   Procedure Laterality Date    CHOLECYSTECTOMY      FOOT SURGERY Right     HYSTERECTOMY TOTAL ABDOMINAL, BILATERAL SALPINGO-OOPHORECTOMY, COMBINED      multiple surgeries        Physical Exam   Patient Vitals for the past 24 hrs:   BP Temp Temp src Pulse Resp SpO2   12/29/23 1833 (!) 149/81 -- -- (!) 47 -- 99 %   12/29/23 1312 (!) 211/89 -- -- 51 -- --   12/29/23 1310 -- 97.6  F (36.4  C) Temporal -- 22 98 %      Constitutional:       General: Not in acute distress.        Appearance: Normal appearance.   HENT:      Head: Normocephalic and atraumatic.   Eyes:      Extraocular Movements: Extraocular movements intact.      Conjunctiva/sclera: Conjunctivae normal.   Cardiovascular:      Rate and Rhythm: Normal rate and regular rhythm.   Pulmonary:      Effort: Pulmonary effort is normal. No respiratory distress.      Breath sounds: Normal breath sounds.   Abdominal:      General: Abdomen is flat. There is no distension.      Palpations: Abdomen is soft.      Tenderness: There is mild epigastric and right upper quadrant abdominal tenderness to palpation.   Musculoskeletal:      Cervical back: Normal range of motion. No rigidity.      Right lower leg: No edema.      Left lower leg: No edema.   Skin:     General: Skin is warm and dry.   Neurological:      General: No focal deficit  present.      Mental Status: Alert and oriented to person, place, and time.   Psychiatric:         Mood and Affect: Mood normal.         Behavior: Behavior normal.    Emergency Department Course   ECG    See ED course for independent interpretation.     Laboratory:  Labs Ordered and Resulted from Time of ED Arrival to Time of ED Departure   COMPREHENSIVE METABOLIC PANEL - Abnormal       Result Value    Sodium 139      Potassium 3.9      Carbon Dioxide (CO2) 26      Anion Gap 11      Urea Nitrogen 8.8      Creatinine 0.60      GFR Estimate >90      Calcium 8.7      Chloride 102      Glucose 134 (*)     Alkaline Phosphatase 94      AST 21      ALT 30      Protein Total 7.3      Albumin 4.3      Bilirubin Total 0.2     ROUTINE UA WITH MICROSCOPIC REFLEX TO CULTURE - Abnormal    Color Urine Light Yellow      Appearance Urine Clear      Glucose Urine 50 (*)     Bilirubin Urine Negative      Ketones Urine Negative      Specific Gravity Urine 1.017      Blood Urine Negative      pH Urine 5.5      Protein Albumin Urine Negative      Urobilinogen Urine Normal      Nitrite Urine Negative      Leukocyte Esterase Urine Negative      Bacteria Urine Few (*)     Mucus Urine Present (*)     RBC Urine 1      WBC Urine <1      Squamous Epithelials Urine 1     LIPASE - Normal    Lipase 18     TROPONIN T, HIGH SENSITIVITY - Normal    Troponin T, High Sensitivity <6     CBC WITH PLATELETS AND DIFFERENTIAL    WBC Count 7.1      RBC Count 4.48      Hemoglobin 13.3      Hematocrit 41.1      MCV 92      MCH 29.7      MCHC 32.4      RDW 13.1      Platelet Count 357      % Neutrophils 47      % Lymphocytes 45      % Monocytes 6      % Eosinophils 1      % Basophils 1      % Immature Granulocytes 0      NRBCs per 100 WBC 0      Absolute Neutrophils 3.3      Absolute Lymphocytes 3.2      Absolute Monocytes 0.5      Absolute Eosinophils 0.1      Absolute Basophils 0.0      Absolute Immature Granulocytes 0.0      Absolute NRBCs 0.0           Emergency Department Course & Assessments:         Interventions:  Medications   sucralfate (CARAFATE) suspension 1 g (1 g Oral $Given 23)   famotidine (PEPCID) tablet 10 mg (10 mg Oral $Given 23)   alum & mag hydroxide-simethicone (MAALOX) suspension 15 mL (15 mLs Oral $Given 23)        Independent Interpretation (X-rays, CTs, rhythm strip):  None    Assessments/Consultations/Discussion of Management or Tests:  ED Course as of 23 2347   Fri Dec 29, 2023   1809 AST: 21   180 ALT: 30    Bilirubin Total: 0.2  No evidence of biliary obstruction.    Alkaline Phosphatase: 94   180 Lipase: 18  No pancreatitis.    WBC: 7.1  Reassuring    EKG 12-lead, tracing only  Sinus bradycardia.  Rate of 46.  Normal MS and QRS.  Normal QTc.  No acute ST elevation or depression as compared with 10/9/2023 EKG.    Troponin T, High Sensitivity: <6  Unlikely ACS    On reevaluation, patient notes symptom improvement and is ready for discharge.  Will prescribe Pepcid and Carafate at discharge.  Patient states that she also has had success with Bentyl prescription and asked for this.  Advised for patient to follow-up with gastroenterology outpatient.  Discussed strict return precautions.  Answered all questions.  Patient voiced understanding and agreement with plan.       Social Determinants of Health affecting care:   None    Disposition:  The patient was discharged to home.     Impression & Plan    CMS Diagnoses: None    Medical Decision Makin-year-old female as described above with history of chronic abdominal pain presents to the emergency department for right upper quadrant abdominal pain.  Patient hemodynamically stable at time evaluation.  Afebrile.  Patient endorses that this pain feels similar to her prior acute cholecystitis and is consistent with her prior chronic abdominal pain.  On examination, patient has mild discomfort to palpation in right upper  quadrant and epigastrium.  Patient endorses chronic ibuprofen usage.  Suspect likely component of gastritis or peptic ulcer disease.  No prior gastroenterology evaluation in the past per the patient.  Additional differential diagnosis considered includes, but not limited to, acute pancreatitis, biliary obstruction, or atypical ACS.  No other discomfort and other areas of the abdomen on palpation.  Abdominal pain lab work ordered by triage otherwise unremarkable.  Given patient's symptoms consistent with prior chronic pain and patient has normal vital signs with no abnormal lab work, unlikely presence of acute intra-abdominal catastrophe. After shared decision discussion and joint review of patient's emergency care plan, patient agrees with not proceeding with CT imaging of the abdomen or pelvis as to avoid further radiation exposure.  Will trial GI cocktail, Pepcid, and Carafate for treatment of potential underlying PUD/gastritis.  Patient has tolerated fluids in triage.  If remainder workup negative, patient has improvement in symptoms.  Will likely discharge with Pepcid and Carafate prescription.  Patient will also follow-up with gastroenterology outpatient.  Information for gastroenterology referral will be placed in discharge instructions.  Discussed care plan with patient who voiced understanding and agreement with plan.  Answered all questions.  Additional workup and orders as listed in chart.    Please refer to ED course above as part of continuation of MDM for details on the patient's treatment course and any changes or updates in care plan beyond my initial evaluation and MDM creation.    Diagnosis:    ICD-10-CM    1. Chronic abdominal pain  R10.9     G89.29       2. Hypertension, unspecified type  I10       3. Nausea and vomiting, unspecified vomiting type  R11.2            Discharge Medications:  Discharge Medication List as of 12/29/2023  8:38 PM        START taking these medications    Details    dicyclomine (BENTYL) 20 MG tablet Take 1 tablet (20 mg) by mouth 4 times daily as needed (Irritable bowel syndrome associated abdominal pain), Disp-20 tablet, R-0, E-Prescribe      !! famotidine (PEPCID) 10 MG tablet Take 1 tablet (10 mg) by mouth 2 times daily for 30 days, Disp-60 tablet, R-0, E-Prescribe      sucralfate (CARAFATE) 1 GM/10ML suspension Take 10 mLs (1 g) by mouth 4 times daily as needed for nausea (Abdominal/stomach pain), Disp-280 mL, R-0, E-Prescribe       !! - Potential duplicate medications found. Please discuss with provider.           JOSE LUIS WATERMAN DO  12/29/2023   Jose Luis Waterman DO Yeh, [place],   12/29/23 3653

## 2023-12-29 NOTE — ED TRIAGE NOTES
Pt ambulatory into triage for RUQ pain since 0700 this morning. Reports emesis x 2. Is drinking flavored water during triage. Hx of cholecystectomy.

## 2023-12-30 NOTE — DISCHARGE INSTRUCTIONS
Please follow-up with your primary care provider and/or specialist regarding your visit to the ER today.    Please return to the emergency department should you experience any of the symptoms we specifically discussed, including but not limited to recurrence or worsening of your symptoms, or development of any new and concerning symptoms.    Please take medication as instructed on bottle.

## 2024-01-02 LAB
ATRIAL RATE - MUSE: 46 BPM
DIASTOLIC BLOOD PRESSURE - MUSE: NORMAL MMHG
INTERPRETATION ECG - MUSE: NORMAL
P AXIS - MUSE: 34 DEGREES
PR INTERVAL - MUSE: 186 MS
QRS DURATION - MUSE: 106 MS
QT - MUSE: 486 MS
QTC - MUSE: 425 MS
R AXIS - MUSE: 1 DEGREES
SYSTOLIC BLOOD PRESSURE - MUSE: NORMAL MMHG
T AXIS - MUSE: 32 DEGREES
VENTRICULAR RATE- MUSE: 46 BPM

## 2024-01-11 ENCOUNTER — HOSPITAL ENCOUNTER (EMERGENCY)
Facility: CLINIC | Age: 49
Discharge: HOME | End: 2024-01-11
Attending: EMERGENCY MEDICINE | Admitting: EMERGENCY MEDICINE

## 2024-01-11 VITALS
DIASTOLIC BLOOD PRESSURE: 70 MMHG | HEART RATE: 62 BPM | TEMPERATURE: 97.3 F | RESPIRATION RATE: 16 BRPM | OXYGEN SATURATION: 95 % | SYSTOLIC BLOOD PRESSURE: 110 MMHG

## 2024-01-11 DIAGNOSIS — R19.7 DIARRHEA OF PRESUMED INFECTIOUS ORIGIN: ICD-10-CM

## 2024-01-11 DIAGNOSIS — R10.31 RIGHT LOWER QUADRANT ABDOMINAL PAIN: ICD-10-CM

## 2024-01-11 LAB
ANION GAP SERPL CALCULATED.3IONS-SCNC: 13 MMOL/L (ref 7–15)
BASOPHILS # BLD AUTO: 0 10E3/UL (ref 0–0.2)
BASOPHILS NFR BLD AUTO: 0 %
BUN SERPL-MCNC: 10.4 MG/DL (ref 6–20)
CALCIUM SERPL-MCNC: 8.9 MG/DL (ref 8.6–10)
CHLORIDE SERPL-SCNC: 105 MMOL/L (ref 98–107)
CREAT SERPL-MCNC: 0.67 MG/DL (ref 0.51–0.95)
CRP SERPL-MCNC: <3 MG/L
DEPRECATED HCO3 PLAS-SCNC: 21 MMOL/L (ref 22–29)
EGFRCR SERPLBLD CKD-EPI 2021: >90 ML/MIN/1.73M2
EOSINOPHIL # BLD AUTO: 0.1 10E3/UL (ref 0–0.7)
EOSINOPHIL NFR BLD AUTO: 1 %
ERYTHROCYTE [DISTWIDTH] IN BLOOD BY AUTOMATED COUNT: 13.2 % (ref 10–15)
GLUCOSE SERPL-MCNC: 161 MG/DL (ref 70–99)
HCT VFR BLD AUTO: 45.7 % (ref 35–47)
HGB BLD-MCNC: 14.7 G/DL (ref 11.7–15.7)
HOLD SPECIMEN: NORMAL
HOLD SPECIMEN: NORMAL
IMM GRANULOCYTES # BLD: 0 10E3/UL
IMM GRANULOCYTES NFR BLD: 0 %
LYMPHOCYTES # BLD AUTO: 2.1 10E3/UL (ref 0.8–5.3)
LYMPHOCYTES NFR BLD AUTO: 27 %
MCH RBC QN AUTO: 29.3 PG (ref 26.5–33)
MCHC RBC AUTO-ENTMCNC: 32.2 G/DL (ref 31.5–36.5)
MCV RBC AUTO: 91 FL (ref 78–100)
MONOCYTES # BLD AUTO: 0.5 10E3/UL (ref 0–1.3)
MONOCYTES NFR BLD AUTO: 6 %
NEUTROPHILS # BLD AUTO: 4.9 10E3/UL (ref 1.6–8.3)
NEUTROPHILS NFR BLD AUTO: 66 %
NRBC # BLD AUTO: 0 10E3/UL
NRBC BLD AUTO-RTO: 0 /100
PLATELET # BLD AUTO: 345 10E3/UL (ref 150–450)
POTASSIUM SERPL-SCNC: 3.7 MMOL/L (ref 3.4–5.3)
RBC # BLD AUTO: 5.02 10E6/UL (ref 3.8–5.2)
SODIUM SERPL-SCNC: 139 MMOL/L (ref 135–145)
WBC # BLD AUTO: 7.5 10E3/UL (ref 4–11)

## 2024-01-11 PROCEDURE — 85025 COMPLETE CBC W/AUTO DIFF WBC: CPT | Performed by: EMERGENCY MEDICINE

## 2024-01-11 PROCEDURE — 80048 BASIC METABOLIC PNL TOTAL CA: CPT | Performed by: EMERGENCY MEDICINE

## 2024-01-11 PROCEDURE — 96375 TX/PRO/DX INJ NEW DRUG ADDON: CPT

## 2024-01-11 PROCEDURE — 96374 THER/PROPH/DIAG INJ IV PUSH: CPT

## 2024-01-11 PROCEDURE — 99284 EMERGENCY DEPT VISIT MOD MDM: CPT | Mod: 25

## 2024-01-11 PROCEDURE — 86140 C-REACTIVE PROTEIN: CPT | Performed by: EMERGENCY MEDICINE

## 2024-01-11 PROCEDURE — 250N000011 HC RX IP 250 OP 636: Performed by: EMERGENCY MEDICINE

## 2024-01-11 PROCEDURE — 36415 COLL VENOUS BLD VENIPUNCTURE: CPT | Performed by: EMERGENCY MEDICINE

## 2024-01-11 RX ORDER — LOPERAMIDE HYDROCHLORIDE 2 MG/1
2 TABLET ORAL 4 TIMES DAILY PRN
Qty: 20 TABLET | Refills: 0 | Status: SHIPPED | OUTPATIENT
Start: 2024-01-11

## 2024-01-11 RX ORDER — KETOROLAC TROMETHAMINE 15 MG/ML
15 INJECTION, SOLUTION INTRAMUSCULAR; INTRAVENOUS ONCE
Status: COMPLETED | OUTPATIENT
Start: 2024-01-11 | End: 2024-01-11

## 2024-01-11 RX ORDER — PROCHLORPERAZINE MALEATE 10 MG
10 TABLET ORAL EVERY 6 HOURS PRN
Qty: 10 TABLET | Refills: 0 | Status: SHIPPED | OUTPATIENT
Start: 2024-01-11

## 2024-01-11 RX ADMIN — KETOROLAC TROMETHAMINE 15 MG: 15 INJECTION, SOLUTION INTRAMUSCULAR; INTRAVENOUS at 15:56

## 2024-01-11 RX ADMIN — PROCHLORPERAZINE EDISYLATE 5 MG: 5 INJECTION INTRAMUSCULAR; INTRAVENOUS at 15:56

## 2024-01-11 ASSESSMENT — ACTIVITIES OF DAILY LIVING (ADL): ADLS_ACUITY_SCORE: 35

## 2024-01-11 NOTE — DISCHARGE INSTRUCTIONS
As we have discussed, due to your care plan, we are not recommending CT scan today due to normal lab work. OK to use medication for pain and for nausea as presribed, Please follow up with your regualr physician for reassessment of diarrhea. If pain is persistant and worse, the ER is happy to reassess you.

## 2024-01-11 NOTE — ED PROVIDER NOTES
History     Chief Complaint:  Abdominal Pain       HPI   Adriane Garrett is a 48 year old female who presents to the emergency room with abdominal pain as well as nausea and diarrhea.  Patient is a 48-year-old female who has a care plan in the emergency room.  In review of her care plan patient is been seen for abdominal pain on multiple occasions multiple CT scans and imaging imaging performed.  Patient's had a cholecystectomy.  Hysterectomy.  Ovary removal on the right.  Does still have an appendix.  Complains of abdominal pain for the last 48 hours including diarrhea nausea and vomiting.  No fever or chills no history of similar pain and presents to the emergency room for assessment.      Independent Historian:   None - Patient Only    Review of External Notes:          Medications:    hyoscyamine (LEVSIN/SL) 0.125 MG sublingual tablet  loperamide (IMODIUM A-D) 2 MG tablet  prochlorperazine (COMPAZINE) 10 MG tablet  albuterol (PROAIR HFA/PROVENTIL HFA/VENTOLIN HFA) 108 (90 Base) MCG/ACT inhaler  clindamycin (CLEOCIN) 150 MG capsule  diclofenac (VOLTAREN) 1 % topical gel  famotidine (PEPCID) 10 MG tablet  famotidine (PEPCID) 20 MG tablet  methocarbamol (ROBAXIN) 750 MG tablet  SUMAtriptan (IMITREX) 25 MG tablet        Past Medical History:    Past Medical History:   Diagnosis Date    Asthma     Bipolar disorder     Gastroesophageal reflux disease     Hyperlipidemia     IBS (irritable bowel syndrome)     Migraine     Obesity        Past Surgical History:    Past Surgical History:   Procedure Laterality Date    CHOLECYSTECTOMY      FOOT SURGERY Right     HYSTERECTOMY TOTAL ABDOMINAL, BILATERAL SALPINGO-OOPHORECTOMY, COMBINED      multiple surgeries        Physical Exam   Patient Vitals for the past 24 hrs:   BP Temp Temp src Pulse Resp SpO2   01/11/24 1257 (!) 126/98 -- -- -- -- --   01/11/24 1254 -- 97.3  F (36.3  C) Temporal 58 18 98 %        Physical Exam  Constitutional:       Appearance: She is obese.   HENT:       Head: Normocephalic.   Eyes:      General: No scleral icterus.  Cardiovascular:      Rate and Rhythm: Normal rate.   Pulmonary:      Effort: Pulmonary effort is normal.   Abdominal:      Tenderness: There is abdominal tenderness in the right lower quadrant.   Skin:     General: Skin is warm.      Capillary Refill: Capillary refill takes less than 2 seconds.   Neurological:      General: No focal deficit present.      Mental Status: She is alert and oriented to person, place, and time.   Psychiatric:         Mood and Affect: Mood normal.           Emergency Department Course       Imaging:  No orders to display          Laboratory:  Labs Ordered and Resulted from Time of ED Arrival to Time of ED Departure   BASIC METABOLIC PANEL - Abnormal       Result Value    Sodium 139      Potassium 3.7      Chloride 105      Carbon Dioxide (CO2) 21 (*)     Anion Gap 13      Urea Nitrogen 10.4      Creatinine 0.67      GFR Estimate >90      Calcium 8.9      Glucose 161 (*)    CRP INFLAMMATION - Normal    CRP Inflammation <3.00     CBC WITH PLATELETS AND DIFFERENTIAL    WBC Count 7.5      RBC Count 5.02      Hemoglobin 14.7      Hematocrit 45.7      MCV 91      MCH 29.3      MCHC 32.2      RDW 13.2      Platelet Count 345      % Neutrophils 66      % Lymphocytes 27      % Monocytes 6      % Eosinophils 1      % Basophils 0      % Immature Granulocytes 0      NRBCs per 100 WBC 0      Absolute Neutrophils 4.9      Absolute Lymphocytes 2.1      Absolute Monocytes 0.5      Absolute Eosinophils 0.1      Absolute Basophils 0.0      Absolute Immature Granulocytes 0.0      Absolute NRBCs 0.0         Emergency Department Course & Assessments:             Interventions:  Medications   prochlorperazine (COMPAZINE) injection 5 mg (5 mg Intravenous $Given 1/11/24 8593)   ketorolac (TORADOL) injection 15 mg (15 mg Intravenous $Given 1/11/24 2203)        Assessments:      Independent Interpretation (X-rays, CTs, rhythm  strip):  None    Consultations/Discussion of Management or Tests:  None        Social Determinants of Health affecting care:   None    Disposition:  The patient was discharged to home.     Impression & Plan        Medical Decision Making:  Patient is a 48-year-old female with a well-documented care plan.  History of chronic abdominal pain and multiple CT scans in the past.  Due to right lower quadrant pain white count and CRP were ordered and negative.  Patient was given Toradol and Compazine for nausea.  Care was discussed with the patient.  It there is a clear delineation of her care plan.  Differential gnosis does include appendicitis but due to normal white count normal CRP history of diarrhea and history of chronic abdominal pain do not recommend imaging today.  Patient was advised that multiple visits to the emergency room for ongoing abdominal pain is detrimental as patient has had multiple CT scans that are not dangerous and does not require any imaging today due to normal lab work and history of diarrhea.  Patient is current encouraged to follow-up with primary care and return with worsening condition for reassessment.      Diagnosis:    ICD-10-CM    1. Right lower quadrant abdominal pain  R10.31       2. Diarrhea of presumed infectious origin  R19.7            Discharge Medications:  New Prescriptions    HYOSCYAMINE (LEVSIN/SL) 0.125 MG SUBLINGUAL TABLET    Place 1 tablet (0.125 mg) under the tongue every 4 hours as needed for cramping    LOPERAMIDE (IMODIUM A-D) 2 MG TABLET    Take 1 tablet (2 mg) by mouth 4 times daily as needed for diarrhea    PROCHLORPERAZINE (COMPAZINE) 10 MG TABLET    Take 1 tablet (10 mg) by mouth every 6 hours as needed for nausea or vomiting          Everett Perdomo MD  1/11/2024   Everett Perdomo MD Goodman, Brian Samuel, MD  01/12/24 1714

## 2024-01-11 NOTE — ED TRIAGE NOTES
"      Pt presents to ED with diarrhea, vomiting and abdominal pain. Diarrhea started Sunday evening into Monday morning. Pain started around 9 last night.   Describes diarrhea as watery and has been having \"a lot\". Vomiting started last night around the time abdominal pain started. Denies fever. Hx of this in the past and has been worked up but they are unable to tell her what it is. states she \"has been told to come back everytime it happens because they never know when it could be something serious.\"  "

## 2024-01-23 ENCOUNTER — HOSPITAL ENCOUNTER (EMERGENCY)
Facility: CLINIC | Age: 49
Discharge: HOME | End: 2024-01-23
Attending: STUDENT IN AN ORGANIZED HEALTH CARE EDUCATION/TRAINING PROGRAM | Admitting: STUDENT IN AN ORGANIZED HEALTH CARE EDUCATION/TRAINING PROGRAM

## 2024-01-23 VITALS
SYSTOLIC BLOOD PRESSURE: 178 MMHG | OXYGEN SATURATION: 98 % | DIASTOLIC BLOOD PRESSURE: 82 MMHG | TEMPERATURE: 98.3 F | HEART RATE: 64 BPM | RESPIRATION RATE: 20 BRPM

## 2024-01-23 DIAGNOSIS — L03.90 CELLULITIS, UNSPECIFIED CELLULITIS SITE: ICD-10-CM

## 2024-01-23 PROCEDURE — 250N000013 HC RX MED GY IP 250 OP 250 PS 637: Performed by: STUDENT IN AN ORGANIZED HEALTH CARE EDUCATION/TRAINING PROGRAM

## 2024-01-23 PROCEDURE — 99283 EMERGENCY DEPT VISIT LOW MDM: CPT

## 2024-01-23 RX ORDER — CEPHALEXIN 500 MG/1
500 CAPSULE ORAL 2 TIMES DAILY
Qty: 14 CAPSULE | Refills: 0 | Status: SHIPPED | OUTPATIENT
Start: 2024-01-23 | End: 2024-01-30

## 2024-01-23 RX ORDER — CEPHALEXIN 500 MG/1
500 CAPSULE ORAL ONCE
Status: COMPLETED | OUTPATIENT
Start: 2024-01-23 | End: 2024-01-23

## 2024-01-23 RX ADMIN — CEPHALEXIN 500 MG: 500 CAPSULE ORAL at 23:09

## 2024-01-23 ASSESSMENT — ACTIVITIES OF DAILY LIVING (ADL): ADLS_ACUITY_SCORE: 33

## 2024-01-24 NOTE — ED PROVIDER NOTES
History     Chief Complaint:  Wound Check       HPI   Adriane Garrett is a 48 year old female who presents with concern for skin infection.  Patient states that 2 weeks ago she developed a wound to the right forearm.  Unknown how this occurred.  She has been cleaning with hydroperoxide and Neosporin but states that wound has not been improving.  For the past several days, area surrounding the wound has been very itchy and red.  She applied a Band-Aid to the area and unfortunately caused further injury as she has adhesive allergy and this caused another wound to occur near the original.  Both have surrounding erythema and warmth.      Independent Historian:   None - Patient Only    Review of External Notes:   None       Medications:    cephALEXin (KEFLEX) 500 MG capsule  albuterol (PROAIR HFA/PROVENTIL HFA/VENTOLIN HFA) 108 (90 Base) MCG/ACT inhaler  clindamycin (CLEOCIN) 150 MG capsule  diclofenac (VOLTAREN) 1 % topical gel  famotidine (PEPCID) 10 MG tablet  famotidine (PEPCID) 20 MG tablet  hyoscyamine (LEVSIN/SL) 0.125 MG sublingual tablet  loperamide (IMODIUM A-D) 2 MG tablet  methocarbamol (ROBAXIN) 750 MG tablet  prochlorperazine (COMPAZINE) 10 MG tablet  SUMAtriptan (IMITREX) 25 MG tablet        Past Medical History:    Past Medical History:   Diagnosis Date    Asthma     Bipolar disorder     Gastroesophageal reflux disease     Hyperlipidemia     IBS (irritable bowel syndrome)     Migraine     Obesity        Past Surgical History:    Past Surgical History:   Procedure Laterality Date    CHOLECYSTECTOMY      FOOT SURGERY Right     HYSTERECTOMY TOTAL ABDOMINAL, BILATERAL SALPINGO-OOPHORECTOMY, COMBINED      multiple surgeries        Physical Exam   Patient Vitals for the past 24 hrs:   BP Temp Temp src Pulse Resp SpO2   01/23/24 2303 -- -- -- -- 20 --   01/23/24 1958 (!) 182/98 98.3  F (36.8  C) Temporal 64 16 98 %        Physical Exam  General: Alert and cooperative with exam. Patient in no apparent distress.  Normal mentation.  Head:  Scalp is NC/AT  Eyes:  No scleral icterus, PERRL  ENT:  The external nose and ears are normal.  Neck:  Normal range of motion without rigidity.  CV:  Regular rate and rhythm    No pathologic murmur   Resp:  Breath sounds are clear bilaterally    Non-labored, no retractions or accessory muscle use  GI:  Abdomen is soft, no distension, no tenderness. No peritoneal signs  MS:  No lower extremity edema   Skin:  2 scabbed over wounds to the dorsal aspect of the right forearm with surrounding inch of erythema and mild warmth.  No drainage.  Neuro:  Oriented x 3. No gross motor deficits.      Emergency Department Course     Procedures   None    Emergency Department Course & Assessments:      Interventions:  Medications   cephALEXin (KEFLEX) capsule 500 mg (has no administration in time range)        Independent Interpretation (X-rays, CTs, rhythm strip):  None    Consultations/Discussion of Management or Tests:  None        Social Determinants of Health affecting care:   None    Disposition:  The patient was discharged to home.     Impression & Plan      Medical Decision Making:  Adriane Garrett is a 48 year old female who presents with concern for skin infection.  On exam, there is findings of wounds to right forearm with surrounding cellulitis.  2 small wounds with approximately 1 inch of surrounding erythema and warmth.  Area of has been pruritic to the patient likely due to ongoing attempted healing.  No evidence of systemic infection requiring IV antibiotics, oral is appropriate at this time.  She is vitally stable and afebrile.  Low suspicion for bacteremia or sepsis.  Start patient on Keflex given her penicillin allergy and she has tolerated this medication in the past.  Initial dose was provided today.  Rx sent to patient's pharmacy.  It does not appear that she has primary care established so referral was provided today.  Encouraged her to follow-up with them for reassessment in 1 to 2  weeks to ensure symptoms are improving.  If surrounding erythema or signs of infection worsen, return to ER.  Patient is safe for discharge home.      Diagnosis:    ICD-10-CM    1. Cellulitis, unspecified cellulitis site  L03.90 Primary Care Referral           Discharge Medications:  New Prescriptions    CEPHALEXIN (KEFLEX) 500 MG CAPSULE    Take 1 capsule (500 mg) by mouth 2 times daily for 7 days          1/23/2024   Michelle Chirinos, Michelle Mckeon, SHAVONNE  01/23/24 0952

## 2024-01-24 NOTE — DISCHARGE INSTRUCTIONS
Continue to keep the area clean and dry.  Apply bacitracin 1-2 times daily.  Follow-up with a primary care provider for reassessment.  I have provided a referral today as it does not appear that you have primary care established.  Return to ER for any worsening symptoms.

## 2024-01-24 NOTE — ED TRIAGE NOTES
Pt. Presents to ED with concern for a R forearm wound infection. Pt. Reports she woke up with this 2 weeks ago. Pt. Reports she has been cleaning with peroxide and using neosporin. Pt. Reports she thinks one of them is caused by a bandaid. Denies fevers. Reports the site is painful.

## 2024-02-07 ENCOUNTER — APPOINTMENT (OUTPATIENT)
Dept: GENERAL RADIOLOGY | Facility: CLINIC | Age: 49
End: 2024-02-07
Attending: EMERGENCY MEDICINE

## 2024-02-07 ENCOUNTER — HOSPITAL ENCOUNTER (EMERGENCY)
Facility: CLINIC | Age: 49
Discharge: HOME | End: 2024-02-07
Attending: STUDENT IN AN ORGANIZED HEALTH CARE EDUCATION/TRAINING PROGRAM | Admitting: STUDENT IN AN ORGANIZED HEALTH CARE EDUCATION/TRAINING PROGRAM

## 2024-02-07 VITALS
TEMPERATURE: 97.5 F | OXYGEN SATURATION: 98 % | DIASTOLIC BLOOD PRESSURE: 89 MMHG | SYSTOLIC BLOOD PRESSURE: 189 MMHG | HEART RATE: 49 BPM | RESPIRATION RATE: 20 BRPM

## 2024-02-07 DIAGNOSIS — M25.512 ACUTE PAIN OF LEFT SHOULDER: Primary | ICD-10-CM

## 2024-02-07 PROCEDURE — 73030 X-RAY EXAM OF SHOULDER: CPT | Mod: LT

## 2024-02-07 PROCEDURE — 99283 EMERGENCY DEPT VISIT LOW MDM: CPT

## 2024-02-07 ASSESSMENT — ACTIVITIES OF DAILY LIVING (ADL): ADLS_ACUITY_SCORE: 33

## 2024-02-07 NOTE — ED PROVIDER NOTES
History   Chief Complaint:  Shoulder Pain       HPI:  Adriane Garrett is a delightful 48 year old female presenting with shoulder pain. Adriane reports 1 week of sharp left shoulder pain that radiates up into her head with movement.  Moving her arm in certain positions, as well as lying on her left arm. She has been taking naproxen, ibuprofen, and Tylenol without relief. She used Icy Hot last night with some relief. She denies known injury or recent heavy lifting. No previous left shoulder injuries.  Patient has a care plan for management of chronic pain.    Independent Historian:  None. Only the patient provided history.    Review of External Notes:  I personally reviewed notes from the patient's  care plan  dated  10/1/21 . This provided me with information regarding  plan for patient's care .     I personally reviewed the patient's chart, including available medication list and available past medical history, past surgical history, family history, and social history.    Physical Exam   Patient Vitals for the past 24 hrs:   BP Temp Pulse Resp SpO2   02/07/24 1327 (!) 189/89 -- (!) 49 20 98 %   02/07/24 1036 -- 97.5  F (36.4  C) -- -- --   02/07/24 1035 (!) 212/107 -- -- 20 --   02/07/24 1034 -- -- 51 -- 97 %      Physical Exam  Vitals reviewed.   Constitutional:       Appearance: She is obese.   Cardiovascular:      Pulses: Normal pulses.      Heart sounds: Normal heart sounds.   Pulmonary:      Effort: No respiratory distress.      Breath sounds: Normal breath sounds.   Musculoskeletal:         General: Tenderness (Mild tenderness to palpation over the left glenohumeral joint and left AC joint) present. No swelling or deformity.      Cervical back: No tenderness.      Comments: Patient has good range of motion in left shoulder.  Pain is elicited with passive flexion and extension of shoulder greater than 90 degrees   Skin:     General: Skin is warm.      Findings: No bruising, erythema, lesion or rash.    Neurological:      Mental Status: She is alert.             Emergency Department Course     Imaging & ECG: Laboratory:   No ECG performed.    XR Shoulder Left G/E 3 Views   Final Result   IMPRESSION: Anatomic alignment left shoulder. No acute displaced left   shoulder fracture is identified. No significant acromioclavicular or   glenohumeral joint space narrowing. Visualized left lung is grossly   clear.          KAYLA SCHAEFFER MD            SYSTEM ID:  [id number]         Report(s) per radiology.  Labs Ordered and Resulted from Time of ED Arrival to Time of ED Departure - No data to display      Procedures  None performed    Interventions & Assessments:         Interventions:  Medications - No data to display     Assessments:     1312  I obtained history and performed initial assessment of the patient. Findings and plan explained to the Patient. Patient discharged home with instructions regarding supportive care, medications, and reasons to return. The importance of close follow-up was reviewed.     Independent Interpretation (X-rays, CTs, rhythm strip):  I independently interpreted the patient's left shoulder x-ray; no signs of fracture or dislocation.     Consultations/Discussion of Management or Tests:  None    Social Determinants of Health affecting care:   None.      Disposition:  The patient was discharged to home.     Impression & Plan        Medical Decision Making:  Patient presenting with acute nontraumatic left shoulder pain.  Based on history and exam, low suspicion for fracture or dislocation.  Did obtain an x-ray to evaluate and this was reassuring.  Considered differential including bursitis, impingement syndrome, adhesive capsulitis, tendinopathy, cervical radiculopathy, among others.  Greatest suspicion is for impingement syndrome at this time.  Recommended patient continue use of NSAIDs and follow with physical therapy for reevaluation and treatment.  Discussed that opioids would not be  indicated and unlikely to be helpful with this presentation. Findings were discussed.  Additional verbal instructions were provided.  I discussed specific warning signs and instructed the patient to return to the emergency department if there are any concerns. Understanding of instructions was voiced, questions were answered and the patient was discharged.        Diagnosis:    ICD-10-CM    1. Acute pain of left shoulder  M25.512 Physical Therapy Referral           Discharge Medications:  Discharge Medication List as of 2/7/2024  1:29 PM        Scribe Disclosure:  I, Anila Baxter, am serving as a scribe at 1:30 PM on 2/7/2024 to document services personally performed by Cl Montes MD based on my observations and the provider's statements to me.        Cl Montes MD  02/07/24 8318

## 2024-02-07 NOTE — DISCHARGE INSTRUCTIONS
Thank you for allowing us to evaluate you today.  Follow up with primary care clinician  and physical therapy  in 1 week for reevaluation..  For pain, use naproxen (Aleve) as discussed.   Use heat packs, acetaminophen, ice as needed for symptom control.  Please read the guidance provided with your discharge instructions.  Immediately return to the emergency department with any concerns.

## 2024-02-07 NOTE — ED TRIAGE NOTES
1 week of left shoulder pain.   States she thought she slept on it wrong, but despite taking  Naproxen, tylenol and ibuprofen and icy hot has not had relief. Pain is worse with movement and feels like the pain shoots up and down the arm when she does move it.

## 2024-03-14 ENCOUNTER — APPOINTMENT (OUTPATIENT)
Dept: GENERAL RADIOLOGY | Facility: CLINIC | Age: 49
End: 2024-03-14
Attending: EMERGENCY MEDICINE

## 2024-03-14 ENCOUNTER — HOSPITAL ENCOUNTER (EMERGENCY)
Facility: CLINIC | Age: 49
Discharge: HOME | End: 2024-03-14
Attending: EMERGENCY MEDICINE | Admitting: EMERGENCY MEDICINE

## 2024-03-14 VITALS
HEIGHT: 60 IN | RESPIRATION RATE: 18 BRPM | OXYGEN SATURATION: 94 % | WEIGHT: 273.37 LBS | HEART RATE: 80 BPM | SYSTOLIC BLOOD PRESSURE: 155 MMHG | DIASTOLIC BLOOD PRESSURE: 90 MMHG | BODY MASS INDEX: 53.67 KG/M2 | TEMPERATURE: 99.3 F

## 2024-03-14 DIAGNOSIS — J10.1 INFLUENZA A: ICD-10-CM

## 2024-03-14 LAB
ATRIAL RATE - MUSE: 63 BPM
DIASTOLIC BLOOD PRESSURE - MUSE: NORMAL MMHG
FLUAV RNA SPEC QL NAA+PROBE: POSITIVE
FLUBV RNA RESP QL NAA+PROBE: NEGATIVE
INTERPRETATION ECG - MUSE: NORMAL
P AXIS - MUSE: 37 DEGREES
PR INTERVAL - MUSE: 158 MS
QRS DURATION - MUSE: 114 MS
QT - MUSE: 396 MS
QTC - MUSE: 405 MS
R AXIS - MUSE: 11 DEGREES
RSV RNA SPEC NAA+PROBE: NEGATIVE
SARS-COV-2 RNA RESP QL NAA+PROBE: NEGATIVE
SYSTOLIC BLOOD PRESSURE - MUSE: NORMAL MMHG
T AXIS - MUSE: 38 DEGREES
VENTRICULAR RATE- MUSE: 63 BPM

## 2024-03-14 PROCEDURE — 87637 SARSCOV2&INF A&B&RSV AMP PRB: CPT | Performed by: EMERGENCY MEDICINE

## 2024-03-14 PROCEDURE — 93005 ELECTROCARDIOGRAM TRACING: CPT

## 2024-03-14 PROCEDURE — 99285 EMERGENCY DEPT VISIT HI MDM: CPT | Mod: 25

## 2024-03-14 PROCEDURE — 71046 X-RAY EXAM CHEST 2 VIEWS: CPT

## 2024-03-14 PROCEDURE — 94640 AIRWAY INHALATION TREATMENT: CPT

## 2024-03-14 PROCEDURE — 250N000013 HC RX MED GY IP 250 OP 250 PS 637: Performed by: EMERGENCY MEDICINE

## 2024-03-14 RX ORDER — ALBUTEROL SULFATE 90 UG/1
4 AEROSOL, METERED RESPIRATORY (INHALATION) ONCE
Status: COMPLETED | OUTPATIENT
Start: 2024-03-14 | End: 2024-03-14

## 2024-03-14 RX ADMIN — ALBUTEROL SULFATE 4 PUFF: 90 AEROSOL, METERED RESPIRATORY (INHALATION) at 12:13

## 2024-03-14 ASSESSMENT — COLUMBIA-SUICIDE SEVERITY RATING SCALE - C-SSRS
2. HAVE YOU ACTUALLY HAD ANY THOUGHTS OF KILLING YOURSELF IN THE PAST MONTH?: NO
1. IN THE PAST MONTH, HAVE YOU WISHED YOU WERE DEAD OR WISHED YOU COULD GO TO SLEEP AND NOT WAKE UP?: NO
6. HAVE YOU EVER DONE ANYTHING, STARTED TO DO ANYTHING, OR PREPARED TO DO ANYTHING TO END YOUR LIFE?: NO

## 2024-03-14 ASSESSMENT — ACTIVITIES OF DAILY LIVING (ADL)
ADLS_ACUITY_SCORE: 33
ADLS_ACUITY_SCORE: 35

## 2024-03-14 NOTE — ED PROVIDER NOTES
History     Chief Complaint:  Cough and Chest Pain       HPI   Adriane Garrett is a 48 year old female with a history of asthma who presents for evaluation of cough, chest pain, fever, chills, and headache that began on Monday, 3 days ago. She has taken Nyquil and Dayquil without improvement. Tmax of 100.8 at home. She has taken ibuprofen with her last dose at 0400. She has a slight sore throat which she attributes to coughing. She describes a mildly productive cough with white phlegm. She denies nausea, vomiting, constipation, diarrhea, dysuria, or hematuria. She has a history of asthma, but does not have inhalers currently.      Independent Historian:   None - Patient Only    Review of External Notes:   2/7/24: ED note reviewed.  Patient presented with shoulder pain    Medications:    Albuterol  Clindamycin  Famotidine  Methocarbamol  Sumatriptan     Past Medical History:    Asthma  Bipolar 1 disorder  GERD  Hypertension  Hyperlipidemia  IBS  Migraines  Obesity  Pancreatitis     Past Surgical History:    Cholecystectomy  Foot surgery  Tubal ligation  Salpingo-oophorectomy    Physical Exam   Patient Vitals for the past 24 hrs:   BP Temp Temp src Pulse Resp SpO2 Height Weight   03/14/24 1217 -- -- -- 80 18 97 % -- --   03/14/24 1129 (!) 133/119 99.3  F (37.4  C) Temporal 79 20 100 % 1.524 m (5') 124 kg (273 lb 5.9 oz)        Physical Exam  General: No acute distress  Head: No obvious trauma to head.  Ears, Nose, Throat:  External ears normal.  Nose normal.  No pharyngeal erythema, swelling or exudate.  Midline uvula. Moist mucus membranes.  Eyes:  Conjunctivae clear.   Neck: Normal range of motion.  Neck supple.   CV: Regular rate and rhythm.  No murmurs.      Respiratory: Effort normal and breath sounds normal. Expiratory wheezing appreciated in the right lung base.  Gastrointestinal: Soft.  No distension. There is no tenderness.  There is no rigidity, no rebound and no guarding.   Musculoskeletal: Normal range of  motion.  Non tender extremities to palpations. No lower extremity edema  Neuro: Alert. Moving all extremities appropriately.  Normal speech.    Skin: Skin is warm and dry.  No rash noted.   Psych: Normal mood and affect. Behavior is normal.     Emergency Department Course   ECG  ECG taken at 1119, ECG read at 1122  NSR  Minimal voltage criteria for LVH, may be normal variant  Borderline ECG   No significant changes as compared to prior, dated 12/29/23.  Rate 63 bpm. TN interval 158 ms. QRS duration 114 ms. QT/QTc 396/405 ms. P-R-T axes 37 11 38.       Imaging:  Chest XR,  PA & LAT   Final Result   IMPRESSION: No acute cardiopulmonary process. Right upper quadrant   surgical clips.      TORI DAVIS MD            SYSTEM ID:  [id number]             Laboratory:  Labs Ordered and Resulted from Time of ED Arrival to Time of ED Departure   INFLUENZA A/B, RSV, & SARS-COV2 PCR - Abnormal       Result Value    Influenza A PCR Positive (*)     Influenza B PCR Negative      RSV PCR Negative      SARS CoV2 PCR Negative         Emergency Department Course & Assessments:    Interventions:  Medications   albuterol (PROVENTIL HFA/VENTOLIN HFA) inhaler (4 puffs Inhalation $Given 3/14/24 1213)        Independent Interpretation (X-rays, CTs, rhythm strip):  Xray: Negative for consolidation, pleural effusion, or pneumothorax.    Assessments/Consultations/Discussion of Management or Tests:  ED Course as of 03/14/24 1310   Thu Mar 14, 2024   1148 I obtained history and examined the patient, as noted above.   1301 I rechecked and updated the patient.       Social Determinants of Health affecting care:   None    Disposition:  The patient was discharged to home.     Impression & Plan      Medical Decision Making:  Patient presents with cough.  She endorses some chest pain that is associated with coughing.  Lungs sound clear to auscultation other than faint wheezing.  She states she used to have an albuterol inhaler, but no longer uses it.   She is given albuterol, with no improvement of her cough or respiratory status.  Chest x-ray is obtained and is negative for consolidation.  She is breathing comfortably on room air.  She is influenza A positive.  Her symptoms started several days ago, so she is not a candidate for Tamiflu.  She is instructed to stay hydrated and to treat fever with Tylenol.  Return precautions are given and she verbalizes understanding.  She is discharged home in stable condition.      Diagnosis:    ICD-10-CM    1. Influenza A  J10.1            Discharge Medications:  New Prescriptions    No medications on file          Scribe Disclosure:  Dickson CHEUNG, am serving as a scribe at 11:18 AM on 3/14/2024 to document services personally performed by Carlitos Trotter MD based on my observations and the provider's statements to me.        Carlitos Trotter MD  03/14/24 1935

## 2024-03-14 NOTE — DISCHARGE INSTRUCTIONS
Discharge Instructions  Influenza    You were diagnosed today with influenza or influenza like illness.  Influenza is a respiratory (breathing) illness caused by influenza A or B viruses.  Influenza causes five primary symptoms: fever, headache, muscle aches/fatigue/malaise, sore throat and cough.  These symptoms start one to four days after you have been around a person with this illness. Influenza is spread through sneezing and coughing and can live on surfaces for several days.  It is usually contagious for 5 days but in some cases up to 10 days and often affects several family members. If you have a family member who is less than 2 years old, older than 65 years old, pregnant or has a serious medical condition, they should be seen right away by a provider to decide if they should take preventative medications. Although influenza will make you feel very ill, most patients don t require any specific treatment. An antiviral medication might be prescribed for certain groups of patients (older patients, younger patients, and those with certain chronic medical problems).    Generally, every Emergency Department visit should have a follow-up clinic visit with either a primary or a specialty clinic/provider. Please follow-up as instructed by your emergency provider today.    Return to the Emergency Department if:  You have trouble breathing.  You develop pain in your chest.  You have signs of being dehydrated, such as dizziness or unable to urinate (pee) at least three times daily.  You are confused or severely weak.  You cannot stop vomiting (throwing up) or you cannot drink enough fluids.    In children, you should seek help if the child has any of the above or if child:  Has blue or purplish skin color.  Is so irritable that he or she does not want to be held.  Does not have tears when crying (in infants) or does not urinate at least three times daily.  Does not wake up easily.    What can I do to help  myself?  Rest.  Fluids -- Drink hydrating solutions such as Gatorade  or Pedialyte  as often as you can. If you are drinking enough, you should pass urine at least every eight hours.  Tylenol  (acetaminophen) and Advil  (ibuprofen) can relieve fever, headache, and muscle aches. Do not give aspirin to children under 18 years old.   Antibiotics -- Antibiotics are NOT useful for treating viral illnesses such as influenza. Antibiotics should only be used if there is a bacterial complication of the flu such as bacterial pneumonia, ear infection, or sinusitis.  Because you were diagnosed with a flu like illness you are very contagious.  This means you cannot work, attend school or  for at least 24 hours or until you no longer have a fever.  If you were given a prescription for medicine here today, be sure to read all of the information (including the package insert) that comes with your prescription.  This will include important information about the medicine, its side effects, and any warnings that you need to know about.  The pharmacist who fills the prescription can provide more information and answer questions you may have about the medicine.  If you have questions or concerns that the pharmacist cannot address, please call or return to the Emergency Department.   Remember that you can always come back to the Emergency Department if you are not able to see your regular provider in the amount of time listed above, if you get any new symptoms, or if there is anything that worries you.

## 2024-03-14 NOTE — ED TRIAGE NOTES
HA CP and cough. Denies throat pain unless coughing. Sx began Monday. Mid sternal CP when coughing. ABC in tact. A/OX4

## 2024-05-23 ENCOUNTER — APPOINTMENT (OUTPATIENT)
Dept: GENERAL RADIOLOGY | Facility: CLINIC | Age: 49
End: 2024-05-23
Attending: STUDENT IN AN ORGANIZED HEALTH CARE EDUCATION/TRAINING PROGRAM

## 2024-05-23 ENCOUNTER — HOSPITAL ENCOUNTER (EMERGENCY)
Facility: CLINIC | Age: 49
Discharge: HOME | End: 2024-05-23
Attending: STUDENT IN AN ORGANIZED HEALTH CARE EDUCATION/TRAINING PROGRAM | Admitting: STUDENT IN AN ORGANIZED HEALTH CARE EDUCATION/TRAINING PROGRAM

## 2024-05-23 ENCOUNTER — APPOINTMENT (OUTPATIENT)
Dept: ULTRASOUND IMAGING | Facility: CLINIC | Age: 49
End: 2024-05-23
Attending: STUDENT IN AN ORGANIZED HEALTH CARE EDUCATION/TRAINING PROGRAM

## 2024-05-23 VITALS
TEMPERATURE: 98.7 F | DIASTOLIC BLOOD PRESSURE: 97 MMHG | BODY MASS INDEX: 47.12 KG/M2 | HEIGHT: 60 IN | OXYGEN SATURATION: 97 % | HEART RATE: 67 BPM | WEIGHT: 240 LBS | SYSTOLIC BLOOD PRESSURE: 187 MMHG | RESPIRATION RATE: 18 BRPM

## 2024-05-23 DIAGNOSIS — M25.531 RIGHT WRIST PAIN: ICD-10-CM

## 2024-05-23 PROCEDURE — 29125 APPL SHORT ARM SPLINT STATIC: CPT | Mod: RT

## 2024-05-23 PROCEDURE — 93971 EXTREMITY STUDY: CPT | Mod: RT

## 2024-05-23 PROCEDURE — 99284 EMERGENCY DEPT VISIT MOD MDM: CPT | Mod: 25

## 2024-05-23 PROCEDURE — 73110 X-RAY EXAM OF WRIST: CPT | Mod: RT

## 2024-05-23 PROCEDURE — 250N000013 HC RX MED GY IP 250 OP 250 PS 637: Performed by: STUDENT IN AN ORGANIZED HEALTH CARE EDUCATION/TRAINING PROGRAM

## 2024-05-23 RX ORDER — IBUPROFEN 600 MG/1
600 TABLET, FILM COATED ORAL ONCE
Status: COMPLETED | OUTPATIENT
Start: 2024-05-23 | End: 2024-05-23

## 2024-05-23 RX ADMIN — IBUPROFEN 600 MG: 600 TABLET, FILM COATED ORAL at 20:00

## 2024-05-23 ASSESSMENT — COLUMBIA-SUICIDE SEVERITY RATING SCALE - C-SSRS
1. IN THE PAST MONTH, HAVE YOU WISHED YOU WERE DEAD OR WISHED YOU COULD GO TO SLEEP AND NOT WAKE UP?: NO
2. HAVE YOU ACTUALLY HAD ANY THOUGHTS OF KILLING YOURSELF IN THE PAST MONTH?: NO
6. HAVE YOU EVER DONE ANYTHING, STARTED TO DO ANYTHING, OR PREPARED TO DO ANYTHING TO END YOUR LIFE?: NO

## 2024-05-23 ASSESSMENT — ACTIVITIES OF DAILY LIVING (ADL)
ADLS_ACUITY_SCORE: 35
ADLS_ACUITY_SCORE: 35

## 2024-05-24 NOTE — ED PROVIDER NOTES
"  Emergency Department Note      History of Present Illness     Chief Complaint  Wrist Pain    HPI  Adriane Garrett is a right-hand dominated 48 year old female with a history of hyperlipidemia and chronic nonalcoholic liver disease who presents to the ED for evaluation right wrist pain. The patient reports that last night she has been having deep, aching pain that is accompanied by numbness and tingling in her fingers. She endorses having difficulty holding utensils when eating and having a \"sleeping\" sensation in her hand periodically. Adds that she took a Tylenol 250 mg/Ibuprofen 125 mg combo, with short-term relief. The patient denies any history of blood clots in herself of family.     Independent Historian  None    Review of External Notes  None  Past Medical History   Medical History and Problem List  Asthma  Bipolar Disorder  GERD  Hyperlipidemia  IBS  Migraine  Obesity  Pancreatitis  Chronic Smoker  Chronic nonalcoholic liver disease     Medications  Albuterol, inhaler  MCG/ACT, inhaler  Pepcid  Levsin/SL  Loperamide  Compazine  Imitrex    Surgical History   Cholecystectomy  Foot surgery, right  Hysterectomy total abdominal, bilateral salpingo-oophorectomy, combined     Physical Exam   Patient Vitals for the past 24 hrs:   BP Temp Temp src Pulse Resp SpO2 Height Weight   05/23/24 1935 (!) 187/97 98.7  F (37.1  C) Oral 67 18 97 % 1.524 m (5') 108.9 kg (240 lb)     Physical Exam  General:  Alert, interactive  Cardiovascular:  Normal rate  Lungs:  No respiratory distress, no accessory muscle use  Abdominal: No distension   Neuro:  Moving all 4 extremities  MSK: No gross deformities  Right wrist: Positive Tinel's sign  Tenderness over right carpal tunnel  Equal hand  bilaterally  Normal sensation in thumb, index, middle fingers right hand  No medial or lateral epicondylar tenderness  Compartments forearm soft  Able to flex and extend at the wrist and elbow   Skin:  Warm, dry    Diagnostics   Lab Results "   Labs Ordered and Resulted from Time of ED Arrival to Time of ED Departure - No data to display    Imaging  US Upper Extremity Venous Duplex Right   Final Result   IMPRESSION:   1.  No deep venous thrombosis in the right upper extremity.      XR Wrist Right G/E 3 Views   Final Result   IMPRESSION: The right wrist is negative for fracture. Normal carpal alignment.        Independent Interpretation  X-ray shows no acute fractures.   ED Course    Medications Administered  Medications   ibuprofen (ADVIL/MOTRIN) tablet 600 mg (600 mg Oral $Given 5/23/24 2000)         Splint Placement     Procedure: Splint Placement     Indication: Pain    Consent: Verbal     Location: Right Wrist    Preparation: Wounds were cleansed and dressed with a non-adherent bandage     Procedure detail:   Splint was applied by Tech/Nurse with my assistance  Splint type: Volar forearm   Splint materilal: Pre-fabricated  After placement I checked and adjusted the fit as needed to ensure proper positioning/fit   Sensation and circulation are intact after splint placement     Patient Status: The patient tolerated the procedure well: Yes. There were no complications.     Discussion of Management  None    Social Determinants of Health adding to complexity of care  None    ED Course  ED Course as of 05/23/24 2108   Thu May 23, 2024   1945 I obtained history and examined the patient as noted above.    2059 I rechecked and updated the patient.      Medical Decision Making / Diagnosis   CMS Diagnoses: None    MIPS     None    MDM  Adrianeshauna Garrett is a 48 year old female who presents for evaluation of tingling in the right wrist, right thumb, right pointer finger, and right middle finger.  This is consistent with median nerve distribution and given exam and history the likely diagnosis is carpal tunnel syndrome.  Discussed treatment.  Splint placed in ED and instructed at a minimum to wear for sleep.  Discussed close follow up of hand surgery.  I doubt this  is CVA, brain tumor, neck radiculopathy, dissection, ACS or other worrisome etiology. XR negative for fracture or bony lesion, US does not show DVT. Can take Ibuprofen or Tylenol for pain. Instructed her to use both medications separately as her combo med does not allow her to maximize dose of either. Patient voices understanding and agreement. All questions answered.     Disposition  The patient was discharged.     ICD-10 Codes:    ICD-10-CM    1. Right wrist pain  M25.531            Scribe Disclosure:  I, Marcela Jordan, am serving as a scribe at 8:07 PM on 5/23/2024 to document services personally performed by Romy Walters DO based on my observations and the provider's statements to me.      Romy Walters DO  05/23/24 3028

## 2024-05-24 NOTE — ED TRIAGE NOTES
Patient arrives with right wrist and hand pain that started last night. No known injury. No obvious swelling, no redness. Describes pain as achy when still, with movement sharp.      Triage Assessment (Adult)       Row Name 05/23/24 1935          Triage Assessment    Airway WDL WDL        Respiratory WDL    Respiratory WDL WDL        Skin Circulation/Temperature WDL    Skin Circulation/Temperature WDL WDL        Cardiac WDL    Cardiac WDL WDL        Peripheral/Neurovascular WDL    Peripheral Neurovascular WDL WDL        Cognitive/Neuro/Behavioral WDL    Cognitive/Neuro/Behavioral WDL WDL

## 2024-06-18 ENCOUNTER — HOSPITAL ENCOUNTER (EMERGENCY)
Facility: CLINIC | Age: 49
Discharge: HOME | End: 2024-06-18
Attending: EMERGENCY MEDICINE | Admitting: EMERGENCY MEDICINE

## 2024-06-18 VITALS
WEIGHT: 250 LBS | OXYGEN SATURATION: 100 % | DIASTOLIC BLOOD PRESSURE: 78 MMHG | RESPIRATION RATE: 18 BRPM | HEART RATE: 62 BPM | TEMPERATURE: 97.8 F | HEIGHT: 60 IN | SYSTOLIC BLOOD PRESSURE: 145 MMHG | BODY MASS INDEX: 49.08 KG/M2

## 2024-06-18 DIAGNOSIS — R10.84 GENERALIZED ABDOMINAL PAIN: ICD-10-CM

## 2024-06-18 LAB
ALBUMIN SERPL BCG-MCNC: 4.2 G/DL (ref 3.5–5.2)
ALBUMIN UR-MCNC: NEGATIVE MG/DL
ALP SERPL-CCNC: 95 U/L (ref 40–150)
ALT SERPL W P-5'-P-CCNC: 53 U/L (ref 0–50)
ANION GAP SERPL CALCULATED.3IONS-SCNC: 13 MMOL/L (ref 7–15)
APPEARANCE UR: CLEAR
AST SERPL W P-5'-P-CCNC: 34 U/L (ref 0–45)
BASOPHILS # BLD AUTO: 0 10E3/UL (ref 0–0.2)
BASOPHILS NFR BLD AUTO: 1 %
BILIRUB SERPL-MCNC: 0.3 MG/DL
BILIRUB UR QL STRIP: NEGATIVE
BUN SERPL-MCNC: 5.9 MG/DL (ref 6–20)
CALCIUM SERPL-MCNC: 9.6 MG/DL (ref 8.6–10)
CHLORIDE SERPL-SCNC: 103 MMOL/L (ref 98–107)
COLOR UR AUTO: ABNORMAL
CREAT SERPL-MCNC: 0.58 MG/DL (ref 0.51–0.95)
CRP SERPL-MCNC: 4.43 MG/L
DEPRECATED HCO3 PLAS-SCNC: 23 MMOL/L (ref 22–29)
EGFRCR SERPLBLD CKD-EPI 2021: >90 ML/MIN/1.73M2
EOSINOPHIL # BLD AUTO: 0.1 10E3/UL (ref 0–0.7)
EOSINOPHIL NFR BLD AUTO: 2 %
ERYTHROCYTE [DISTWIDTH] IN BLOOD BY AUTOMATED COUNT: 13.2 % (ref 10–15)
GLUCOSE SERPL-MCNC: 129 MG/DL (ref 70–99)
GLUCOSE UR STRIP-MCNC: NEGATIVE MG/DL
HCT VFR BLD AUTO: 41.4 % (ref 35–47)
HGB BLD-MCNC: 13.5 G/DL (ref 11.7–15.7)
HGB UR QL STRIP: NEGATIVE
IMM GRANULOCYTES # BLD: 0 10E3/UL
IMM GRANULOCYTES NFR BLD: 0 %
KETONES UR STRIP-MCNC: NEGATIVE MG/DL
LEUKOCYTE ESTERASE UR QL STRIP: NEGATIVE
LYMPHOCYTES # BLD AUTO: 3.7 10E3/UL (ref 0.8–5.3)
LYMPHOCYTES NFR BLD AUTO: 57 %
MCH RBC QN AUTO: 29.5 PG (ref 26.5–33)
MCHC RBC AUTO-ENTMCNC: 32.6 G/DL (ref 31.5–36.5)
MCV RBC AUTO: 91 FL (ref 78–100)
MONOCYTES # BLD AUTO: 0.4 10E3/UL (ref 0–1.3)
MONOCYTES NFR BLD AUTO: 7 %
MUCOUS THREADS #/AREA URNS LPF: PRESENT /LPF
NEUTROPHILS # BLD AUTO: 2.2 10E3/UL (ref 1.6–8.3)
NEUTROPHILS NFR BLD AUTO: 34 %
NITRATE UR QL: NEGATIVE
NRBC # BLD AUTO: 0 10E3/UL
NRBC BLD AUTO-RTO: 0 /100
PH UR STRIP: 5.5 [PH] (ref 5–7)
PLATELET # BLD AUTO: 325 10E3/UL (ref 150–450)
POTASSIUM SERPL-SCNC: 4.1 MMOL/L (ref 3.4–5.3)
PROT SERPL-MCNC: 7.3 G/DL (ref 6.4–8.3)
RBC # BLD AUTO: 4.57 10E6/UL (ref 3.8–5.2)
RBC URINE: <1 /HPF
SODIUM SERPL-SCNC: 139 MMOL/L (ref 135–145)
SP GR UR STRIP: 1.01 (ref 1–1.03)
SQUAMOUS EPITHELIAL: 2 /HPF
UROBILINOGEN UR STRIP-MCNC: NORMAL MG/DL
WBC # BLD AUTO: 6.5 10E3/UL (ref 4–11)
WBC URINE: 1 /HPF

## 2024-06-18 PROCEDURE — 250N000011 HC RX IP 250 OP 636: Mod: JZ | Performed by: EMERGENCY MEDICINE

## 2024-06-18 PROCEDURE — 99284 EMERGENCY DEPT VISIT MOD MDM: CPT | Mod: 25

## 2024-06-18 PROCEDURE — 258N000003 HC RX IP 258 OP 636: Mod: JZ | Performed by: EMERGENCY MEDICINE

## 2024-06-18 PROCEDURE — 84155 ASSAY OF PROTEIN SERUM: CPT | Performed by: EMERGENCY MEDICINE

## 2024-06-18 PROCEDURE — 96374 THER/PROPH/DIAG INJ IV PUSH: CPT

## 2024-06-18 PROCEDURE — 85025 COMPLETE CBC W/AUTO DIFF WBC: CPT | Performed by: EMERGENCY MEDICINE

## 2024-06-18 PROCEDURE — 96361 HYDRATE IV INFUSION ADD-ON: CPT

## 2024-06-18 PROCEDURE — 96375 TX/PRO/DX INJ NEW DRUG ADDON: CPT

## 2024-06-18 PROCEDURE — 81001 URINALYSIS AUTO W/SCOPE: CPT | Performed by: EMERGENCY MEDICINE

## 2024-06-18 PROCEDURE — 86140 C-REACTIVE PROTEIN: CPT | Performed by: EMERGENCY MEDICINE

## 2024-06-18 PROCEDURE — 36415 COLL VENOUS BLD VENIPUNCTURE: CPT | Performed by: EMERGENCY MEDICINE

## 2024-06-18 RX ORDER — KETOROLAC TROMETHAMINE 15 MG/ML
10 INJECTION, SOLUTION INTRAMUSCULAR; INTRAVENOUS ONCE
Status: COMPLETED | OUTPATIENT
Start: 2024-06-18 | End: 2024-06-18

## 2024-06-18 RX ADMIN — SODIUM CHLORIDE 1000 ML: 9 INJECTION, SOLUTION INTRAVENOUS at 16:08

## 2024-06-18 RX ADMIN — PROCHLORPERAZINE EDISYLATE 5 MG: 5 INJECTION INTRAMUSCULAR; INTRAVENOUS at 16:09

## 2024-06-18 RX ADMIN — KETOROLAC TROMETHAMINE 10 MG: 15 INJECTION, SOLUTION INTRAMUSCULAR; INTRAVENOUS at 16:08

## 2024-06-18 ASSESSMENT — COLUMBIA-SUICIDE SEVERITY RATING SCALE - C-SSRS
6. HAVE YOU EVER DONE ANYTHING, STARTED TO DO ANYTHING, OR PREPARED TO DO ANYTHING TO END YOUR LIFE?: NO
1. IN THE PAST MONTH, HAVE YOU WISHED YOU WERE DEAD OR WISHED YOU COULD GO TO SLEEP AND NOT WAKE UP?: NO
2. HAVE YOU ACTUALLY HAD ANY THOUGHTS OF KILLING YOURSELF IN THE PAST MONTH?: NO

## 2024-06-18 ASSESSMENT — ACTIVITIES OF DAILY LIVING (ADL)
ADLS_ACUITY_SCORE: 33
ADLS_ACUITY_SCORE: 35
ADLS_ACUITY_SCORE: 35

## 2024-06-18 NOTE — DISCHARGE INSTRUCTIONS
Call your GI clinic to follow-up in 1 week.    Return to the ER for any new or worsening symptoms.

## 2024-06-18 NOTE — ED PROVIDER NOTES
Emergency Department Note      History of Present Illness     Chief Complaint  Abdominal Pain    HPI  Adriane Garrett is a 48 year old female with a past medical history of pancreatitis, GERD, HTN, and hyperlipidemia who presents with abdominal pain. The patient states that last night at 2200 when laying in bed she developed right sided abdominal pain. At that time she also developed nausea though has had no episodes of vomiting. She notes that she has had diarrhea since Sunday. She denies any abnormal foods that she ate prior to this or any diet changes. She denies any dysuria or urinary frequency. She denies any measured fevers. This feels similar to the abdominal pain she has had in the past.    Review of External Notes  Prior ED note reviewed from July 3, 2023 when the patient was seen for abdominal pain and diarrhea.  Past Medical History   Medical History and Problem List  Asthma  Bipolar disorder  GERD  Hyperlipidemia  IBS  Migraine  Obesity  Vitamin D deficiency  Pancreatitis  HTN    Medications  albuterol   famotidine  loperamide   methocarbamol   prochlorperazine   SUMAtriptan     Surgical History   Cholecystectomy  Right foot surgery  Hysterectomy  Physical Exam   Patient Vitals for the past 24 hrs:   BP Temp Temp src Pulse Resp SpO2 Height Weight   06/18/24 1811 (!) 145/78 -- -- 62 -- 100 % -- --   06/18/24 1700 (!) 151/78 -- -- -- -- 96 % -- --   06/18/24 1522 (!) 195/89 97.8  F (36.6  C) Temporal 55 18 98 % 1.524 m (5') 113.4 kg (250 lb)     Physical Exam  Constitutional:       General: She is not in acute distress.     Appearance: Normal appearance. She is not diaphoretic.   HENT:      Head: Atraumatic.      Mouth/Throat:      Mouth: Mucous membranes are moist.   Eyes:      General: No scleral icterus.     Conjunctiva/sclera: Conjunctivae normal.   Cardiovascular:      Rate and Rhythm: Normal rate and regular rhythm.      Heart sounds: Normal heart sounds.   Pulmonary:      Effort: No respiratory  distress.      Breath sounds: Normal breath sounds.   Abdominal:      General: Abdomen is flat. There is no distension.      Tenderness: There is no abdominal tenderness.   Musculoskeletal:      Cervical back: Neck supple.      Right lower leg: No edema.      Left lower leg: No edema.   Skin:     General: Skin is warm.      Capillary Refill: Capillary refill takes less than 2 seconds.      Findings: No rash.   Neurological:      General: No focal deficit present.      Mental Status: She is alert and oriented to person, place, and time.   Psychiatric:         Mood and Affect: Mood normal.         Behavior: Behavior normal.         Diagnostics   Lab Results   Labs Ordered and Resulted from Time of ED Arrival to Time of ED Departure   COMPREHENSIVE METABOLIC PANEL - Abnormal       Result Value    Sodium 139      Potassium 4.1      Carbon Dioxide (CO2) 23      Anion Gap 13      Urea Nitrogen 5.9 (*)     Creatinine 0.58      GFR Estimate >90      Calcium 9.6      Chloride 103      Glucose 129 (*)     Alkaline Phosphatase 95      AST 34      ALT 53 (*)     Protein Total 7.3      Albumin 4.2      Bilirubin Total 0.3     ROUTINE UA WITH MICROSCOPIC REFLEX TO CULTURE - Abnormal    Color Urine Light Yellow      Appearance Urine Clear      Glucose Urine Negative      Bilirubin Urine Negative      Ketones Urine Negative      Specific Gravity Urine 1.015      Blood Urine Negative      pH Urine 5.5      Protein Albumin Urine Negative      Urobilinogen Urine Normal      Nitrite Urine Negative      Leukocyte Esterase Urine Negative      Mucus Urine Present (*)     RBC Urine <1      WBC Urine 1      Squamous Epithelials Urine 2 (*)    CRP INFLAMMATION - Normal    CRP Inflammation 4.43     CBC WITH PLATELETS AND DIFFERENTIAL    WBC Count 6.5      RBC Count 4.57      Hemoglobin 13.5      Hematocrit 41.4      MCV 91      MCH 29.5      MCHC 32.6      RDW 13.2      Platelet Count 325      % Neutrophils 34      % Lymphocytes 57      %  Monocytes 7      % Eosinophils 2      % Basophils 1      % Immature Granulocytes 0      NRBCs per 100 WBC 0      Absolute Neutrophils 2.2      Absolute Lymphocytes 3.7      Absolute Monocytes 0.4      Absolute Eosinophils 0.1      Absolute Basophils 0.0      Absolute Immature Granulocytes 0.0      Absolute NRBCs 0.0       Independent Interpretation  None  ED Course    Medications Administered  Medications   sodium chloride 0.9% BOLUS 1,000 mL (0 mLs Intravenous Stopped 6/18/24 1807)   ketorolac (TORADOL) injection 10 mg (10 mg Intravenous $Given 6/18/24 1608)   prochlorperazine (COMPAZINE) injection 5 mg (5 mg Intravenous $Given 6/18/24 1609)     ED Course  ED Course as of 06/18/24 2158 Tue Jun 18, 2024   1537 Obtained the patients history and performed initial exam     Medical Decision Making / Diagnosis     MDM  Adriane Garrett is a 48 year old female who presents to the ED for evaluation of abdominal pain.  She does have a care plan given numerous prior episodes of abdominal pain that brought her into the ED.  She has had many imaging studies.  Her care plan stipulates no opiates and limiting CT is much as possible.    The patient does not have a fever.  White count and CRP are normal.  This would argue against appendicitis especially given that she is having diarrhea and her pain feels very similar to her prior pain.  She feels that this is likely her IBS and will follow with GI.    Disposition  The patient was discharged.     ICD-10 Codes:    ICD-10-CM    1. Generalized abdominal pain  R10.84            Scribe Disclosure:  I, Juanito Romero, am serving as a scribe at 3:28 PM on 6/18/2024 to document services personally performed by Luciano Temple MD based on my observations and the provider's statements to me.        Luciano Temple MD  06/18/24 2158

## 2024-06-18 NOTE — ED TRIAGE NOTES
[place] the community. Reports right lower and middle abdominal pain. Reports this started last night.     Report last bowel movement was this afternoon, states she is having loose stools. Denies urinary symptoms. Denies fevers at home.

## 2024-06-30 ENCOUNTER — HEALTH MAINTENANCE LETTER (OUTPATIENT)
Age: 49
End: 2024-06-30

## 2024-10-28 ENCOUNTER — HOSPITAL ENCOUNTER (EMERGENCY)
Facility: CLINIC | Age: 49
Discharge: HOME | End: 2024-10-28
Attending: STUDENT IN AN ORGANIZED HEALTH CARE EDUCATION/TRAINING PROGRAM | Admitting: STUDENT IN AN ORGANIZED HEALTH CARE EDUCATION/TRAINING PROGRAM

## 2024-10-28 VITALS
DIASTOLIC BLOOD PRESSURE: 75 MMHG | OXYGEN SATURATION: 98 % | HEIGHT: 60 IN | RESPIRATION RATE: 17 BRPM | WEIGHT: 279.1 LBS | BODY MASS INDEX: 54.8 KG/M2 | TEMPERATURE: 97.8 F | HEART RATE: 74 BPM | SYSTOLIC BLOOD PRESSURE: 165 MMHG

## 2024-10-28 DIAGNOSIS — M54.50 ACUTE RIGHT-SIDED LOW BACK PAIN WITHOUT SCIATICA: ICD-10-CM

## 2024-10-28 PROCEDURE — 250N000013 HC RX MED GY IP 250 OP 250 PS 637: Performed by: STUDENT IN AN ORGANIZED HEALTH CARE EDUCATION/TRAINING PROGRAM

## 2024-10-28 PROCEDURE — 99283 EMERGENCY DEPT VISIT LOW MDM: CPT

## 2024-10-28 RX ORDER — HYDROCODONE BITARTRATE AND ACETAMINOPHEN 5; 325 MG/1; MG/1
2 TABLET ORAL ONCE
Status: COMPLETED | OUTPATIENT
Start: 2024-10-28 | End: 2024-10-28

## 2024-10-28 RX ORDER — METHOCARBAMOL 500 MG/1
500 TABLET, FILM COATED ORAL ONCE
Status: COMPLETED | OUTPATIENT
Start: 2024-10-28 | End: 2024-10-28

## 2024-10-28 RX ORDER — METHOCARBAMOL 500 MG/1
500 TABLET, FILM COATED ORAL 4 TIMES DAILY PRN
Qty: 30 TABLET | Refills: 0 | Status: SHIPPED | OUTPATIENT
Start: 2024-10-28

## 2024-10-28 RX ORDER — LIDOCAINE 4 G/G
1 PATCH TOPICAL ONCE
Status: DISCONTINUED | OUTPATIENT
Start: 2024-10-28 | End: 2024-10-28 | Stop reason: HOSPADM

## 2024-10-28 RX ADMIN — HYDROCODONE BITARTRATE AND ACETAMINOPHEN 2 TABLET: 5; 325 TABLET ORAL at 16:33

## 2024-10-28 RX ADMIN — METHOCARBAMOL 500 MG: 500 TABLET ORAL at 16:33

## 2024-10-28 RX ADMIN — LIDOCAINE 1 PATCH: 4 PATCH TOPICAL at 16:33

## 2024-10-28 ASSESSMENT — COLUMBIA-SUICIDE SEVERITY RATING SCALE - C-SSRS
1. IN THE PAST MONTH, HAVE YOU WISHED YOU WERE DEAD OR WISHED YOU COULD GO TO SLEEP AND NOT WAKE UP?: NO
6. HAVE YOU EVER DONE ANYTHING, STARTED TO DO ANYTHING, OR PREPARED TO DO ANYTHING TO END YOUR LIFE?: NO
2. HAVE YOU ACTUALLY HAD ANY THOUGHTS OF KILLING YOURSELF IN THE PAST MONTH?: NO

## 2024-10-28 NOTE — ED PROVIDER NOTES
Emergency Department Note      History of Present Illness     Chief Complaint   Back Pain      HPI   Adriane Garrett is a 49 year old female with a history of asthma, pancreatitis and hyperlipidemia presenting with right sided lower back pain. The patient reports falling forwards when tripping over her dog on Saturday, her daughter tried grabbing her but ended up twisting her back. Did not fall all the way as daughter caught her. She notes that she has had similar pain in the past. She placed an over the counter lidocaine patch that night which helped a bit. Last night she used a heating pad to help her symptoms. She has also taken took ibuprofen, tylenol and naproxen which have not helped. She denies pain or tingling in her legs, groin area or upper back. Denies incontinence.    Independent Historian   None    Review of External Notes   None    Past Medical History     Medical History and Problem List   Asthma  Bipolar disorder  GERD  Hyperlipidemia  IBS  Migraine  Obesity  Vitamin D deficiency  Pancreatitis  HTN    Medications   albuterol   famotidine  loperamide   methocarbamol   prochlorperazine   SUMAtriptan     Surgical History   Cholecystectomy  Right foot Surgery  Hysterectomy Total Abdominal, Bilateral Salpingo-Oophorectomy, Combined    Physical Exam     Patient Vitals for the past 24 hrs:   BP Temp Temp src Pulse Resp SpO2 Height Weight   10/28/24 1409 (!) 185/81 97.8  F (36.6  C) Temporal 65 16 100 % 1.524 m (5') 126.6 kg (279 lb 1.6 oz)     Physical Exam  General: Alert and cooperative with exam. Patient in no apparent distress. Normal mentation.  Head:  Scalp is NC/AT  Eyes:  No scleral icterus, PERRL  ENT:  The external nose and ears are normal.   Neck:  Normal range of motion without rigidity.  CV:  Regular rate and rhythm    No pathologic murmur   Resp:  Breath sounds are clear bilaterally    Non-labored, no retractions or accessory muscle use  GI:  Abdomen is soft, no distension, no tenderness. No  peritoneal signs  MS:  No tenderness to palpation over the thoracic or lumbar spine.  Mild tenderness over the right lumbar paraspinal region.  Patient is able to ambulate but does endorse worsening pain with flexion and when going from standing to seated position.  Skin:  Warm and dry, No rash or lesions noted.  Neuro:  Oriented x 3. No gross motor deficits.        ED Course      Medications Administered   Medications   Lidocaine (LIDOCARE) 4 % Patch 1 patch (1 patch Transdermal $Patch/Med Applied 10/28/24 1633)   methocarbamol (ROBAXIN) tablet 500 mg (500 mg Oral $Given 10/28/24 1633)   HYDROcodone-acetaminophen (NORCO) 5-325 MG per tablet 2 tablet (2 tablets Oral $Given 10/28/24 1633)       Procedures   Procedures     Discussion of Management   None    ED Course   ED Course as of 10/28/24 1654   Mon Oct 28, 2024   1614 I obtained the history and examined the patient as noted above.     1649 I rechecked the patient and explained findings.      Additional Documentation  None    Medical Decision Making / Diagnosis     CMS Diagnoses: None    MIPS       None    MDM   Adrianeshauna Garrett is a 49 year old female who presents with back pain.  Pain has improved with interventions in the emergency department.  The patient did not sustain any trauma, therefore x-rays are not necessary due to the low likelihood of fracture or subluxation. The patient has not had a fever, saddle/perineal anesthesia, bilateral foot numbness, or bowel or bladder dysfunction.  He has no red flags in history of cancer or IVDU. There is no clinical evidence of cauda equina syndrome, discitis, spinal/epidural space hematoma or epidural abscess. The neurological exam is normal and the patient's symptoms are consistent with a musculoskeletal (myofascial) strain. The patient will be discharged with pain medications to use as directed.  Ice or heat to the back and stretching exercises.  No heavy lifting, bending or twisting. Return if increasing pain,  numbness, weakness, or bowel or bladder dysfunction.  The patient was advised to schedule follow-up with his/her primary doctor within 3 days to re-assess symptoms. Primary care referral placed today.       Disposition   The patient was discharged.     Diagnosis     ICD-10-CM    1. Acute right-sided low back pain without sciatica  M54.50 Primary Care Referral           Discharge Medications   New Prescriptions    METHOCARBAMOL (ROBAXIN) 500 MG TABLET    Take 1 tablet (500 mg) by mouth 4 times daily as needed for muscle spasms.     Scribe Disclosure:  I, Stephanie Hussein, am serving as a scribe at 4:23 PM on 10/28/2024 to document services personally performed by Michelle Chirinos PA-C based on my observations and the provider's statements to me.        Michelle Chirinos PA-C  10/28/24 8619

## 2024-10-28 NOTE — ED TRIAGE NOTES
Patient reports generalized lower back pain for about 3 days.  Denies bowel or bladder changes, denies injury.  ABCs intact, A&Ox4.     Triage Assessment (Adult)       Row Name 10/28/24 8462          Triage Assessment    Airway WDL WDL        Respiratory WDL    Respiratory WDL WDL        Skin Circulation/Temperature WDL    Skin Circulation/Temperature WDL WDL        Cardiac WDL    Cardiac WDL WDL        Peripheral/Neurovascular WDL    Peripheral Neurovascular WDL WDL        Cognitive/Neuro/Behavioral WDL    Cognitive/Neuro/Behavioral WDL WDL

## 2024-10-28 NOTE — DISCHARGE INSTRUCTIONS
Muscle relaxer has been sent to pharmacy, please take as prescribed for ongoing muscular pain.  I understand you are going through insurance difficulties.  I placed a primary care referral for you to follow-up with in clinic once you have insurance established.  Continue ibuprofen and Tylenol for symptomatic relief and applying lidocaine patches as well.  Muscle laxer can cause drowsiness so do not take prior to driving or operating heavy machinery.

## 2024-12-27 ENCOUNTER — HOSPITAL ENCOUNTER (EMERGENCY)
Facility: CLINIC | Age: 49
Discharge: HOME | End: 2024-12-27
Attending: STUDENT IN AN ORGANIZED HEALTH CARE EDUCATION/TRAINING PROGRAM | Admitting: STUDENT IN AN ORGANIZED HEALTH CARE EDUCATION/TRAINING PROGRAM

## 2024-12-27 ENCOUNTER — APPOINTMENT (OUTPATIENT)
Dept: GENERAL RADIOLOGY | Facility: CLINIC | Age: 49
End: 2024-12-27
Attending: EMERGENCY MEDICINE

## 2024-12-27 VITALS
HEIGHT: 60 IN | SYSTOLIC BLOOD PRESSURE: 172 MMHG | OXYGEN SATURATION: 97 % | DIASTOLIC BLOOD PRESSURE: 92 MMHG | BODY MASS INDEX: 53.45 KG/M2 | TEMPERATURE: 98.1 F | HEART RATE: 57 BPM | WEIGHT: 272.27 LBS | RESPIRATION RATE: 18 BRPM

## 2024-12-27 DIAGNOSIS — S89.92XA KNEE INJURY, LEFT, INITIAL ENCOUNTER: ICD-10-CM

## 2024-12-27 PROCEDURE — 99283 EMERGENCY DEPT VISIT LOW MDM: CPT

## 2024-12-27 PROCEDURE — 73562 X-RAY EXAM OF KNEE 3: CPT | Mod: LT

## 2024-12-27 ASSESSMENT — ACTIVITIES OF DAILY LIVING (ADL): ADLS_ACUITY_SCORE: 41

## 2024-12-27 NOTE — ED PROVIDER NOTES
Emergency Department Note      History of Present Illness     Chief Complaint   Knee Pain    HPI   Adriane Garrett is a 49 year old female who presents to the emergency department for evaluation of knee injury.  8 days ago, patient was with her mother-in-law.  She lost her balance and twisted her knee in order to prevent herself and her mother-in-law from falling to the ground.  Since then, she has had pain in the left knee.  She reports no pain at rest but increased pain with bending the knee.  She has been able to walk on it but it is painful.  She has been taking ibuprofen for pain.  Denies numbness or weakness.  Denies previous injury or surgery to this knee.  She is not on blood thinners.    Independent Historian   None    Review of External Notes   I reviewed past medical history and nursing notes    Past Medical History     Medical History and Problem List   Past Medical History:   Diagnosis Date    Asthma     Bipolar disorder     Gastroesophageal reflux disease     Hyperlipidemia     IBS (irritable bowel syndrome)     Migraine     Obesity      Medications   albuterol (PROAIR HFA/PROVENTIL HFA/VENTOLIN HFA) 108 (90 Base) MCG/ACT inhaler  clindamycin (CLEOCIN) 150 MG capsule  diclofenac (VOLTAREN) 1 % topical gel  famotidine (PEPCID) 20 MG tablet  hyoscyamine (LEVSIN/SL) 0.125 MG sublingual tablet  loperamide (IMODIUM A-D) 2 MG tablet  methocarbamol (ROBAXIN) 500 MG tablet  methocarbamol (ROBAXIN) 750 MG tablet  prochlorperazine (COMPAZINE) 10 MG tablet  SUMAtriptan (IMITREX) 25 MG tablet      Surgical History   Past Surgical History:   Procedure Laterality Date    CHOLECYSTECTOMY      FOOT SURGERY Right     HYSTERECTOMY TOTAL ABDOMINAL, BILATERAL SALPINGO-OOPHORECTOMY, COMBINED      multiple surgeries     Physical Exam     Patient Vitals for the past 24 hrs:   BP Temp Temp src Pulse Resp SpO2 Height Weight   12/27/24 1408 (!) 172/92 98.1  F (36.7  C) Oral 57 18 97 % 1.524 m (5') 123.5 kg (272 lb 4.3 oz)      Physical Exam  Vital signs and nursing notes reviewed.    General:  Alert and oriented, no acute distress. Resting on bed with daughter at bedside.   Skin: Skin is warm and dry. No rash. No diaphoresis.  HEENT:   Head: Normocephalic, atraumatic. Facial features symmetric.   Eyes: Conjunctiva pink, sclera white. EOMs grossly intact.   Mouth and throat: Lips are moist with no lesions or edema  Neck: Normal range of motion.    CV:  Heart RRR. 2+ radial and tibialis posterior pulses bilaterally. No peripheral edema.  Pulm/Chest: Chest wall expansion symmetric with no increased effort of breathing.   M/S: Moves all extremities spontaneously.  MS:    Left Knee: No warmth, erythema, or edema. The Quadriceps and Patella Tendon is intact, The Patella is in the midline without bony tenderness. No significant joint effusion. Pain elicited but no joint laxity with anterior / posterior drawer test. + medial joint line pain/tenderness, no lateral joint line pain/tenderness. + pain , no joint laxity with valgus stress. Pre-patellar bursae is without effusion.  Psych: Normal mood and affect. Behavior is normal.     Diagnostics     Lab Results   Labs Ordered and Resulted from Time of ED Arrival to Time of ED Departure - No data to display    Imaging   XR Knee Left 3 Views   Final Result   IMPRESSION: Normal. No fracture, effusion or calcified intra-articular   body.      RICARDO GA MD            SYSTEM ID:  [id number]        Independent Interpretation   I reviewed the knee x-ray and appreciate no acute fracture    ED Course      Medications Administered   Medications - No data to display    Procedures   Procedures     Discussion of Management   None    ED Course        Additional Documentation  None    Medical Decision Making / Diagnosis     CMS Diagnoses: None    MIPS       None    MDM   Adriane L Gerry is a 49 year old female who presents for evaluation of left knee pain. See HPI. Her exam findings are noted above, most  pertinent is no redness, warmth to knee making septic arthritis and/or crystal disease of joint very unlikely.  Signs and symptoms are consistent with a knee sprain.  A broad differential was also considered including sprain, strain, fracture, tendon rupture, nerve impingement/compromise, referred pain.  X-ray is negative for fracture or joint effusion. CMS is intact. Supportive outpatient management is indicated.  Rest, ice, and elevation treatment was discussed with the patient.  Patient's anatomy not conducive to knee immobilizer, she will be placed in a Ace wrap and limit mobility for a few days with elevation and ice.  Tylenol ibuprofen for pain.  Follow-up with [facility] if pain persisting in 1 week.  Return to the ED for fevers, numbness, uncontrolled pain, or further emergent concerns.  Patient agreeable to plan and had questions answered.    Disposition   The patient was discharged.     Diagnosis     ICD-10-CM    1. Knee injury, left, initial encounter  S89.92XA            Discharge Medications   New Prescriptions    No medications on file     Ayanna Parmar PA-C on 12/27/2024 at 4:39 PM       Ayanna Parmar PA-C  12/27/24 1639

## 2024-12-27 NOTE — DISCHARGE INSTRUCTIONS
Take 1000 mg of Tylenol every ~6 hours for pain.  Do not exceed 4000 mg in 24 hours.  Take 600 mg of ibuprofen every 6-8 hours for pain.  Do not exceed 2400 mg in a 24-hour period.  Follow-up with orthopedics for further evaluation and management  Wear Ace wrap and rest the leg over the next few days.  Return to the ED if you develop fevers, numbness, or further emergent concerns.  She had no

## 2024-12-27 NOTE — ED TRIAGE NOTES
Patient reports 1 week of left knee pain. Patient states it is painful to bear weight. Patient states she almost fell last week and twisted her knee in an attempt not to fall.      Triage Assessment (Adult)       Row Name 12/27/24 1405          Triage Assessment    Airway WDL WDL        Respiratory WDL    Respiratory WDL WDL        Skin Circulation/Temperature WDL    Skin Circulation/Temperature WDL WDL        Cardiac WDL    Cardiac WDL WDL        Peripheral/Neurovascular WDL    Peripheral Neurovascular WDL WDL        Cognitive/Neuro/Behavioral WDL    Cognitive/Neuro/Behavioral WDL WDL

## 2025-01-22 ENCOUNTER — HOSPITAL ENCOUNTER (EMERGENCY)
Facility: CLINIC | Age: 50
Discharge: HOME | End: 2025-01-22
Attending: EMERGENCY MEDICINE | Admitting: EMERGENCY MEDICINE

## 2025-01-22 VITALS
BODY MASS INDEX: 53.58 KG/M2 | SYSTOLIC BLOOD PRESSURE: 186 MMHG | DIASTOLIC BLOOD PRESSURE: 88 MMHG | WEIGHT: 272.93 LBS | OXYGEN SATURATION: 99 % | RESPIRATION RATE: 20 BRPM | HEART RATE: 60 BPM | HEIGHT: 60 IN | TEMPERATURE: 97.3 F

## 2025-01-22 DIAGNOSIS — R10.84 GENERALIZED ABDOMINAL PAIN: ICD-10-CM

## 2025-01-22 LAB
ALBUMIN SERPL BCG-MCNC: 4 G/DL (ref 3.5–5.2)
ALBUMIN UR-MCNC: 10 MG/DL
ALP SERPL-CCNC: 93 U/L (ref 40–150)
ALT SERPL W P-5'-P-CCNC: 33 U/L (ref 0–50)
ANION GAP SERPL CALCULATED.3IONS-SCNC: 13 MMOL/L (ref 7–15)
APPEARANCE UR: CLEAR
AST SERPL W P-5'-P-CCNC: 30 U/L (ref 0–45)
BASOPHILS # BLD AUTO: 0 10E3/UL (ref 0–0.2)
BASOPHILS NFR BLD AUTO: 1 %
BILIRUB SERPL-MCNC: 0.4 MG/DL
BILIRUB UR QL STRIP: NEGATIVE
BUN SERPL-MCNC: 8.4 MG/DL (ref 6–20)
CALCIUM SERPL-MCNC: 8.9 MG/DL (ref 8.8–10.4)
CHLORIDE SERPL-SCNC: 98 MMOL/L (ref 98–107)
COLOR UR AUTO: YELLOW
CREAT SERPL-MCNC: 0.6 MG/DL (ref 0.51–0.95)
EGFRCR SERPLBLD CKD-EPI 2021: >90 ML/MIN/1.73M2
EOSINOPHIL # BLD AUTO: 0.1 10E3/UL (ref 0–0.7)
EOSINOPHIL NFR BLD AUTO: 2 %
ERYTHROCYTE [DISTWIDTH] IN BLOOD BY AUTOMATED COUNT: 13 % (ref 10–15)
GLUCOSE SERPL-MCNC: 189 MG/DL (ref 70–99)
GLUCOSE UR STRIP-MCNC: 70 MG/DL
HCO3 SERPL-SCNC: 26 MMOL/L (ref 22–29)
HCT VFR BLD AUTO: 40.7 % (ref 35–47)
HGB BLD-MCNC: 13.3 G/DL (ref 11.7–15.7)
HGB UR QL STRIP: NEGATIVE
HOLD SPECIMEN: NORMAL
HOLD SPECIMEN: NORMAL
IMM GRANULOCYTES # BLD: 0 10E3/UL
IMM GRANULOCYTES NFR BLD: 1 %
KETONES UR STRIP-MCNC: NEGATIVE MG/DL
LEUKOCYTE ESTERASE UR QL STRIP: NEGATIVE
LIPASE SERPL-CCNC: 14 U/L (ref 13–60)
LYMPHOCYTES # BLD AUTO: 2.6 10E3/UL (ref 0.8–5.3)
LYMPHOCYTES NFR BLD AUTO: 40 %
MCH RBC QN AUTO: 29.5 PG (ref 26.5–33)
MCHC RBC AUTO-ENTMCNC: 32.7 G/DL (ref 31.5–36.5)
MCV RBC AUTO: 90 FL (ref 78–100)
MONOCYTES # BLD AUTO: 0.4 10E3/UL (ref 0–1.3)
MONOCYTES NFR BLD AUTO: 7 %
MUCOUS THREADS #/AREA URNS LPF: PRESENT /LPF
NEUTROPHILS # BLD AUTO: 3.3 10E3/UL (ref 1.6–8.3)
NEUTROPHILS NFR BLD AUTO: 51 %
NITRATE UR QL: NEGATIVE
NRBC # BLD AUTO: 0 10E3/UL
NRBC BLD AUTO-RTO: 0 /100
PH UR STRIP: 5 [PH] (ref 5–7)
PLAT MORPH BLD: NORMAL
PLATELET # BLD AUTO: 292 10E3/UL (ref 150–450)
POTASSIUM SERPL-SCNC: 3.8 MMOL/L (ref 3.4–5.3)
PROT SERPL-MCNC: 6.8 G/DL (ref 6.4–8.3)
RBC # BLD AUTO: 4.51 10E6/UL (ref 3.8–5.2)
RBC MORPH BLD: NORMAL
RBC URINE: 1 /HPF
SODIUM SERPL-SCNC: 137 MMOL/L (ref 135–145)
SP GR UR STRIP: 1.03 (ref 1–1.03)
SQUAMOUS EPITHELIAL: 2 /HPF
UROBILINOGEN UR STRIP-MCNC: NORMAL MG/DL
WBC # BLD AUTO: 6.5 10E3/UL (ref 4–11)
WBC URINE: 1 /HPF

## 2025-01-22 PROCEDURE — 85025 COMPLETE CBC W/AUTO DIFF WBC: CPT | Performed by: EMERGENCY MEDICINE

## 2025-01-22 PROCEDURE — 258N000003 HC RX IP 258 OP 636: Performed by: EMERGENCY MEDICINE

## 2025-01-22 PROCEDURE — 36415 COLL VENOUS BLD VENIPUNCTURE: CPT | Performed by: EMERGENCY MEDICINE

## 2025-01-22 PROCEDURE — 250N000011 HC RX IP 250 OP 636: Performed by: EMERGENCY MEDICINE

## 2025-01-22 PROCEDURE — 83690 ASSAY OF LIPASE: CPT | Performed by: EMERGENCY MEDICINE

## 2025-01-22 PROCEDURE — 96361 HYDRATE IV INFUSION ADD-ON: CPT

## 2025-01-22 PROCEDURE — 96374 THER/PROPH/DIAG INJ IV PUSH: CPT

## 2025-01-22 PROCEDURE — 99284 EMERGENCY DEPT VISIT MOD MDM: CPT | Mod: 25

## 2025-01-22 PROCEDURE — 80053 COMPREHEN METABOLIC PANEL: CPT | Performed by: EMERGENCY MEDICINE

## 2025-01-22 PROCEDURE — 81003 URINALYSIS AUTO W/O SCOPE: CPT | Performed by: EMERGENCY MEDICINE

## 2025-01-22 PROCEDURE — 81001 URINALYSIS AUTO W/SCOPE: CPT | Performed by: EMERGENCY MEDICINE

## 2025-01-22 RX ORDER — ONDANSETRON 2 MG/ML
4 INJECTION INTRAMUSCULAR; INTRAVENOUS ONCE
Status: COMPLETED | OUTPATIENT
Start: 2025-01-22 | End: 2025-01-22

## 2025-01-22 RX ADMIN — ONDANSETRON 4 MG: 2 INJECTION INTRAMUSCULAR; INTRAVENOUS at 09:12

## 2025-01-22 RX ADMIN — SODIUM CHLORIDE 1000 ML: 9 INJECTION, SOLUTION INTRAVENOUS at 09:13

## 2025-01-22 ASSESSMENT — COLUMBIA-SUICIDE SEVERITY RATING SCALE - C-SSRS
2. HAVE YOU ACTUALLY HAD ANY THOUGHTS OF KILLING YOURSELF IN THE PAST MONTH?: NO
6. HAVE YOU EVER DONE ANYTHING, STARTED TO DO ANYTHING, OR PREPARED TO DO ANYTHING TO END YOUR LIFE?: NO
1. IN THE PAST MONTH, HAVE YOU WISHED YOU WERE DEAD OR WISHED YOU COULD GO TO SLEEP AND NOT WAKE UP?: NO

## 2025-01-22 ASSESSMENT — ACTIVITIES OF DAILY LIVING (ADL)
ADLS_ACUITY_SCORE: 41

## 2025-01-22 NOTE — ED PROVIDER NOTES
Emergency Department Note      History of Present Illness     Chief Complaint   Abdominal Pain      HPI   Adriane Garrett is a 49 year old female with history of hypertension and hyperlipidemia who presents to the ED for evaluation of abdominal pain. The patient reports that she has been dealing with upper abdominal pain since 3 PM yesterday. She states that her pain worsens after eating. She endorses nausea and vomiting. Patient reports of urinary frequency but denies dysuria or hematuria. She notes that she has a history of pancreatitis and this feels similar. Patient has not been around anyone sick. Denies vomiting. Denies eating anything unusual.     Independent Historian   None    Review of External Notes   I reviewed the patient's care plan.     Past Medical History     Medical History and Problem List   Asthma  Bipolar disorder  GERD  Hyperlipidemia  Irritable bowel syndrome   Migraine  Obesity  Vitamin D deficiency  Pancreatitis  Hypertension     Medications   Albuterol  Cleocin  Pepcid  Robaxin  Compazine  Imitrex    Surgical History   Cholecystectomy  Hysterectomy    Physical Exam     Patient Vitals for the past 24 hrs:   BP Temp Temp src Pulse Resp SpO2 Height Weight   01/22/25 0754 (!) 186/88 97.3  F (36.3  C) Temporal (!) 60 20 99 % 1.524 m (5') 123.8 kg (272 lb 14.9 oz)     Physical Exam  General: Alert, No distress. Nontoxic appearance  Head: No signs of trauma.   Mouth/Throat: Dry mucous membranes.   Eyes: Conjunctivae are normal. Pupils are equal..   Neck: Normal range of motion.    CV: Appears well perfused.  Resp:No respiratory distress.   GI: LUQ pain.  No mass.  MSK: Normal range of motion. No obvious deformity.   Neuro: The patient is alert and interactive. FREY. Speech normal. GCS 15  Skin: No lesion or sign of trauma noted.   Psych: normal mood and affect. behavior is normal.      Diagnostics     Lab Results   Labs Ordered and Resulted from Time of ED Arrival to Time of ED Departure    COMPREHENSIVE METABOLIC PANEL - Abnormal       Result Value    Sodium 137      Potassium 3.8      Carbon Dioxide (CO2) 26      Anion Gap 13      Urea Nitrogen 8.4      Creatinine 0.60      GFR Estimate >90      Calcium 8.9      Chloride 98      Glucose 189 (*)     Alkaline Phosphatase 93      AST 30      ALT 33      Protein Total 6.8      Albumin 4.0      Bilirubin Total 0.4     ROUTINE UA WITH MICROSCOPIC REFLEX TO CULTURE - Abnormal    Color Urine Yellow      Appearance Urine Clear      Glucose Urine 70 (*)     Bilirubin Urine Negative      Ketones Urine Negative      Specific Gravity Urine 1.026      Blood Urine Negative      pH Urine 5.0      Protein Albumin Urine 10 (*)     Urobilinogen Urine Normal      Nitrite Urine Negative      Leukocyte Esterase Urine Negative      Mucus Urine Present (*)     RBC Urine 1      WBC Urine 1      Squamous Epithelials Urine 2 (*)    LIPASE - Normal    Lipase 14     CBC WITH PLATELETS AND DIFFERENTIAL    WBC Count 6.5      RBC Count 4.51      Hemoglobin 13.3      Hematocrit 40.7      MCV 90      MCH 29.5      MCHC 32.7      RDW 13.0      Platelet Count 292      % Neutrophils 51      % Lymphocytes 40      % Monocytes 7      % Eosinophils 2      % Basophils 1      % Immature Granulocytes 1      NRBCs per 100 WBC 0      Absolute Neutrophils 3.3      Absolute Lymphocytes 2.6      Absolute Monocytes 0.4      Absolute Eosinophils 0.1      Absolute Basophils 0.0      Absolute Immature Granulocytes 0.0      Absolute NRBCs 0.0     RBC AND PLATELET MORPHOLOGY    RBC Morphology Confirmed RBC Indices      Platelet Assessment        Value: Automated Count Confirmed. Platelet morphology is normal.       Imaging   None    EKG   None    Independent Interpretation   None    ED Course      Medications Administered   Medications   sodium chloride 0.9% BOLUS 1,000 mL (0 mLs Intravenous Stopped 1/22/25 1145)   ondansetron (ZOFRAN) injection 4 mg (4 mg Intravenous $Given 1/22/25 0247)        Procedures   None     Discussion of Management   None    ED Course   ED Course as of 01/22/25 1229   Wed Jan 22, 2025   0839 I obtained history and examined the patient as noted above.        Additional Documentation  None    Medical Decision Making / Diagnosis     CMS Diagnoses: None    MIPS   None    MDM   Adrianeshauna Garrett presents with generalized abdominal pain.  The differential diagnosis is broad and includes:  appendicitis, cholecystitis, peptic ulcer disease, diverticulitis, bowel obstruction, ischemia, pancreatitis, amongst others.  The patient has a care plan regarding extensive history of similar symptoms.  Based on clinical exam and laboratory testing, no significant significant etiologies for the pain were found.  The nausea has improved with interventions in the ED.  She will be discharged, and was warned that persistent or worsening symptoms should prompt re-examination by a physician (ED if necessary) in 8-12 hours.     Disposition   The patient was discharged.  She will follow-up with [facility] for further evaluation and treatment.    Diagnosis     ICD-10-CM    1. Generalized abdominal pain  R10.84            Scribe Disclosure:  IToño, am serving as a scribe at 8:28 AM on 1/22/2025 to document services personally performed by Andrew Jeong MD based on my observations and the provider's statements to me.        Andrew Jeong MD  01/22/25 4249

## 2025-01-22 NOTE — ED TRIAGE NOTES
Pt c/o LUQ pain since yesterday. Pt has had nausea and diarrhea. No vomiting. No urinating difficulties or fevers. Gallbladder is removed VSS      Triage Assessment (Adult)       Row Name 01/22/25 0755          Triage Assessment    Airway WDL WDL        Respiratory WDL    Respiratory WDL WDL        Cardiac WDL    Cardiac WDL WDL        Peripheral/Neurovascular WDL    Peripheral Neurovascular WDL WDL

## 2025-02-08 ENCOUNTER — HEALTH MAINTENANCE LETTER (OUTPATIENT)
Age: 50
End: 2025-02-08

## 2025-05-01 ENCOUNTER — APPOINTMENT (OUTPATIENT)
Dept: GENERAL RADIOLOGY | Facility: CLINIC | Age: 50
End: 2025-05-01
Attending: EMERGENCY MEDICINE

## 2025-05-01 ENCOUNTER — HOSPITAL ENCOUNTER (EMERGENCY)
Facility: CLINIC | Age: 50
Discharge: HOME | End: 2025-05-01
Attending: EMERGENCY MEDICINE

## 2025-05-01 VITALS
HEIGHT: 60 IN | DIASTOLIC BLOOD PRESSURE: 86 MMHG | BODY MASS INDEX: 52.42 KG/M2 | HEART RATE: 68 BPM | OXYGEN SATURATION: 96 % | SYSTOLIC BLOOD PRESSURE: 150 MMHG | WEIGHT: 266.98 LBS | RESPIRATION RATE: 16 BRPM | TEMPERATURE: 97.3 F

## 2025-05-01 DIAGNOSIS — J98.01 BRONCHOSPASM: ICD-10-CM

## 2025-05-01 DIAGNOSIS — J40 BRONCHITIS: ICD-10-CM

## 2025-05-01 LAB
ANION GAP SERPL CALCULATED.3IONS-SCNC: 10 MMOL/L (ref 7–15)
ATRIAL RATE - MUSE: 58 BPM
BASOPHILS # BLD AUTO: 0 10E3/UL (ref 0–0.2)
BASOPHILS NFR BLD AUTO: 1 %
BUN SERPL-MCNC: 12.1 MG/DL (ref 6–20)
CALCIUM SERPL-MCNC: 9.2 MG/DL (ref 8.8–10.4)
CHLORIDE SERPL-SCNC: 100 MMOL/L (ref 98–107)
CREAT SERPL-MCNC: 0.47 MG/DL (ref 0.51–0.95)
D DIMER PPP FEU-MCNC: 0.31 UG/ML FEU (ref 0–0.5)
DIASTOLIC BLOOD PRESSURE - MUSE: NORMAL MMHG
EGFRCR SERPLBLD CKD-EPI 2021: >90 ML/MIN/1.73M2
EOSINOPHIL # BLD AUTO: 0.1 10E3/UL (ref 0–0.7)
EOSINOPHIL NFR BLD AUTO: 2 %
ERYTHROCYTE [DISTWIDTH] IN BLOOD BY AUTOMATED COUNT: 13.1 % (ref 10–15)
FLUAV RNA SPEC QL NAA+PROBE: NEGATIVE
FLUBV RNA RESP QL NAA+PROBE: NEGATIVE
GLUCOSE SERPL-MCNC: 237 MG/DL (ref 70–99)
HCO3 SERPL-SCNC: 26 MMOL/L (ref 22–29)
HCT VFR BLD AUTO: 40.8 % (ref 35–47)
HGB BLD-MCNC: 13.2 G/DL (ref 11.7–15.7)
HOLD SPECIMEN: NORMAL
IMM GRANULOCYTES # BLD: 0 10E3/UL
IMM GRANULOCYTES NFR BLD: 1 %
INTERPRETATION ECG - MUSE: NORMAL
LYMPHOCYTES # BLD AUTO: 3.2 10E3/UL (ref 0.8–5.3)
LYMPHOCYTES NFR BLD AUTO: 47 %
MCH RBC QN AUTO: 29.4 PG (ref 26.5–33)
MCHC RBC AUTO-ENTMCNC: 32.4 G/DL (ref 31.5–36.5)
MCV RBC AUTO: 91 FL (ref 78–100)
MONOCYTES # BLD AUTO: 0.5 10E3/UL (ref 0–1.3)
MONOCYTES NFR BLD AUTO: 8 %
NEUTROPHILS # BLD AUTO: 3 10E3/UL (ref 1.6–8.3)
NEUTROPHILS NFR BLD AUTO: 43 %
NRBC # BLD AUTO: 0 10E3/UL
NRBC BLD AUTO-RTO: 0 /100
P AXIS - MUSE: 33 DEGREES
PLATELET # BLD AUTO: 323 10E3/UL (ref 150–450)
POTASSIUM SERPL-SCNC: 3.9 MMOL/L (ref 3.4–5.3)
PR INTERVAL - MUSE: 170 MS
QRS DURATION - MUSE: 108 MS
QT - MUSE: 438 MS
QTC - MUSE: 429 MS
R AXIS - MUSE: 0 DEGREES
RBC # BLD AUTO: 4.49 10E6/UL (ref 3.8–5.2)
RSV RNA SPEC NAA+PROBE: NEGATIVE
SARS-COV-2 RNA RESP QL NAA+PROBE: NEGATIVE
SODIUM SERPL-SCNC: 136 MMOL/L (ref 135–145)
SYSTOLIC BLOOD PRESSURE - MUSE: NORMAL MMHG
T AXIS - MUSE: 39 DEGREES
TROPONIN T SERPL HS-MCNC: <6 NG/L
VENTRICULAR RATE- MUSE: 58 BPM
WBC # BLD AUTO: 6.9 10E3/UL (ref 4–11)

## 2025-05-01 PROCEDURE — 85025 COMPLETE CBC W/AUTO DIFF WBC: CPT | Performed by: EMERGENCY MEDICINE

## 2025-05-01 PROCEDURE — 85379 FIBRIN DEGRADATION QUANT: CPT | Performed by: EMERGENCY MEDICINE

## 2025-05-01 PROCEDURE — 87637 SARSCOV2&INF A&B&RSV AMP PRB: CPT | Performed by: EMERGENCY MEDICINE

## 2025-05-01 PROCEDURE — 250N000009 HC RX 250: Performed by: EMERGENCY MEDICINE

## 2025-05-01 PROCEDURE — 93005 ELECTROCARDIOGRAM TRACING: CPT

## 2025-05-01 PROCEDURE — 94640 AIRWAY INHALATION TREATMENT: CPT

## 2025-05-01 PROCEDURE — 80048 BASIC METABOLIC PNL TOTAL CA: CPT | Performed by: EMERGENCY MEDICINE

## 2025-05-01 PROCEDURE — 71046 X-RAY EXAM CHEST 2 VIEWS: CPT

## 2025-05-01 PROCEDURE — 84484 ASSAY OF TROPONIN QUANT: CPT | Performed by: EMERGENCY MEDICINE

## 2025-05-01 PROCEDURE — 36415 COLL VENOUS BLD VENIPUNCTURE: CPT | Performed by: EMERGENCY MEDICINE

## 2025-05-01 PROCEDURE — 99285 EMERGENCY DEPT VISIT HI MDM: CPT | Mod: 25

## 2025-05-01 RX ORDER — ALBUTEROL SULFATE 90 UG/1
2 INHALANT RESPIRATORY (INHALATION) EVERY 6 HOURS PRN
Qty: 18 G | Refills: 0 | Status: SHIPPED | OUTPATIENT
Start: 2025-05-01

## 2025-05-01 RX ORDER — IPRATROPIUM BROMIDE AND ALBUTEROL SULFATE 2.5; .5 MG/3ML; MG/3ML
3 SOLUTION RESPIRATORY (INHALATION) ONCE
Status: COMPLETED | OUTPATIENT
Start: 2025-05-01 | End: 2025-05-01

## 2025-05-01 RX ADMIN — IPRATROPIUM BROMIDE AND ALBUTEROL SULFATE 3 ML: .5; 3 SOLUTION RESPIRATORY (INHALATION) at 07:56

## 2025-05-01 ASSESSMENT — ACTIVITIES OF DAILY LIVING (ADL)
ADLS_ACUITY_SCORE: 41
ADLS_ACUITY_SCORE: 41

## 2025-05-01 NOTE — ED TRIAGE NOTES
Patient presents to ED chest pain that worsens with cough and deep breathing that started last Sunday.  Thinks pain is from cough.  Denies SOB.

## 2025-05-01 NOTE — ED PROVIDER NOTES
Emergency Department Note      History of Present Illness     Chief Complaint   Cough and Pleurisy      HPI   Adriane Garrett is a 49 year old female who presents to the ER for evaluation of 2 weeks of constant chest pressure that is worse with coughing and breathing.  Patient has had productive cough of green sputum over the recent past.  No fever.  No significant nasal congestion or postnasal drip that she has appreciated.  No vomiting or diarrhea.  No history of MI/DVT/PE.  She has a history of smoking and vaping.  She is not sure if she has history of asthma or COPD or emphysema.  She has been taking DayQuil and NyQuil without much benefit.    Independent Historian   None    Review of External Notes         Past Medical History     Medical History and Problem List   Past Medical History:   Diagnosis Date    Asthma     Bipolar disorder     Gastroesophageal reflux disease     Hyperlipidemia     IBS (irritable bowel syndrome)     Migraine     Obesity        Medications   albuterol (PROAIR HFA/PROVENTIL HFA/VENTOLIN HFA) 108 (90 Base) MCG/ACT inhaler  albuterol (PROAIR HFA/PROVENTIL HFA/VENTOLIN HFA) 108 (90 Base) MCG/ACT inhaler  clindamycin (CLEOCIN) 150 MG capsule  diclofenac (VOLTAREN) 1 % topical gel  famotidine (PEPCID) 20 MG tablet  hyoscyamine (LEVSIN/SL) 0.125 MG sublingual tablet  loperamide (IMODIUM A-D) 2 MG tablet  methocarbamol (ROBAXIN) 500 MG tablet  methocarbamol (ROBAXIN) 750 MG tablet  prochlorperazine (COMPAZINE) 10 MG tablet  SUMAtriptan (IMITREX) 25 MG tablet        Surgical History   Past Surgical History:   Procedure Laterality Date    CHOLECYSTECTOMY      FOOT SURGERY Right     HYSTERECTOMY TOTAL ABDOMINAL, BILATERAL SALPINGO-OOPHORECTOMY, COMBINED      multiple surgeries       Physical Exam     Patient Vitals for the past 24 hrs:   BP Temp Temp src Pulse Resp SpO2 Height Weight   05/01/25 0915 (!) 150/86 -- -- 68 16 96 % -- --   05/01/25 0733 (!) 159/102 97.3  F (36.3  C) Temporal 65 18 95  % 1.524 m (5') 121.1 kg (266 lb 15.6 oz)     Physical Exam  VS: Reviewed per above  HENT: Mucous membranes moist  EYES: sclera anicteric  CV: Rate as noted,  regular rhythm.   RESP: Effort normal.  Mild basilar expiratory wheezing bilaterally.  GI: no tenderness/rebound/guarding, not distended.  NEURO: Alert, moving all extremities  MSK: No deformity of the extremities  SKIN: Warm and dry    Diagnostics     Lab Results   Labs Ordered and Resulted from Time of ED Arrival to Time of ED Departure   BASIC METABOLIC PANEL - Abnormal       Result Value    Sodium 136      Potassium 3.9      Chloride 100      Carbon Dioxide (CO2) 26      Anion Gap 10      Urea Nitrogen 12.1      Creatinine 0.47 (*)     GFR Estimate >90      Calcium 9.2      Glucose 237 (*)    TROPONIN T, HIGH SENSITIVITY - Normal    Troponin T, High Sensitivity <6     D DIMER QUANTITATIVE - Normal    D-Dimer Quantitative 0.31     INFLUENZA A/B, RSV AND SARS-COV2 PCR - Normal    Influenza A PCR Negative      Influenza B PCR Negative      RSV PCR Negative      SARS CoV2 PCR Negative     CBC WITH PLATELETS AND DIFFERENTIAL    WBC Count 6.9      RBC Count 4.49      Hemoglobin 13.2      Hematocrit 40.8      MCV 91      MCH 29.4      MCHC 32.4      RDW 13.1      Platelet Count 323      % Neutrophils 43      % Lymphocytes 47      % Monocytes 8      % Eosinophils 2      % Basophils 1      % Immature Granulocytes 1      NRBCs per 100 WBC 0      Absolute Neutrophils 3.0      Absolute Lymphocytes 3.2      Absolute Monocytes 0.5      Absolute Eosinophils 0.1      Absolute Basophils 0.0      Absolute Immature Granulocytes 0.0      Absolute NRBCs 0.0         Imaging   Chest XR,  PA & LAT   Final Result   IMPRESSION: No acute cardiopulmonary process.          EKG   ECG results from 05/01/25   EKG 12-lead, tracing only     Value    Systolic Blood Pressure     Diastolic Blood Pressure     Ventricular Rate 58    Atrial Rate 58    OR Interval 170    QRS Duration 108         QTc 429    P [place] 33    R AXIS 0    T Axis 39    Interpretation ECG      Sinus bradycardia  Otherwise normal ECG  When compared with ECG of 14-Mar-2024 11:19,  No significant change was found             Independent Interpretation   None    ED Course      Medications Administered   Medications   ipratropium - albuterol 0.5 mg/2.5 mg/3 mL (DUONEB) neb solution 3 mL (3 mLs Nebulization $Given 5/1/25 075)       Procedures   Procedures     Discussion of Management   None    ED Course        Additional Documentation  None    Medical Decision Making / Diagnosis     CMS Diagnoses: None    MIPS       None    MDM   Adrianeshauna Garrett is a 49 year old female who presents to the ER for evaluation of constant chest discomfort and cough over the past few weeks.  Vital signs notable for mild hypertension.  On exam she has mild bibasilar expiratory wheezing.  Given history of asthma, DuoNeb was provided.  Steroids withheld due to history of bipolar disorder.  Evaluation is negative for signs of ACS or myocarditis or PE.  Chest x-ray without signs of pneumonia, pneumothorax, aortic dissection.  Plan for discharge with albuterol inhaler.  Considered Tessalon but she has reported allergy.  Primary care follow-up recommended.  Return precautions discussed.    Disposition   The patient was discharged.     Diagnosis     ICD-10-CM    1. Bronchitis  J40       2. Bronchospasm  J98.01            Discharge Medications   Discharge Medication List as of 5/1/2025  9:20 AM        START taking these medications    Details   !! albuterol (PROAIR HFA/PROVENTIL HFA/VENTOLIN HFA) 108 (90 Base) MCG/ACT inhaler Inhale 2 puffs into the lungs every 6 hours as needed for shortness of breath, wheezing or cough., Disp-18 g, R-0, E-PrescribePharmacy may dispense brand covered by insurance (Proair, or proventil or ventolin or generic albuterol inhaler)       !! - Potential duplicate medications found. Please discuss with provider.                    Hal Rivera MD  05/01/25 1170

## 2025-07-13 ENCOUNTER — HEALTH MAINTENANCE LETTER (OUTPATIENT)
Age: 50
End: 2025-07-13

## 2025-07-17 ENCOUNTER — HOSPITAL ENCOUNTER (EMERGENCY)
Facility: CLINIC | Age: 50
End: 2025-07-17
Attending: EMERGENCY MEDICINE

## 2025-07-17 VITALS
HEIGHT: 60 IN | TEMPERATURE: 98.4 F | DIASTOLIC BLOOD PRESSURE: 100 MMHG | OXYGEN SATURATION: 100 % | RESPIRATION RATE: 17 BRPM | HEART RATE: 68 BPM | SYSTOLIC BLOOD PRESSURE: 134 MMHG | WEIGHT: 265.43 LBS | BODY MASS INDEX: 52.11 KG/M2

## 2025-07-17 DIAGNOSIS — R73.9 HYPERGLYCEMIA: ICD-10-CM

## 2025-07-17 DIAGNOSIS — R10.84 GENERALIZED ABDOMINAL PAIN: ICD-10-CM

## 2025-07-17 LAB
ALBUMIN SERPL BCG-MCNC: 4.2 G/DL (ref 3.5–5.2)
ALBUMIN UR-MCNC: 10 MG/DL
ALP SERPL-CCNC: 105 U/L (ref 40–150)
ALT SERPL W P-5'-P-CCNC: 55 U/L (ref 0–50)
ANION GAP SERPL CALCULATED.3IONS-SCNC: 16 MMOL/L (ref 7–15)
APPEARANCE UR: CLEAR
AST SERPL W P-5'-P-CCNC: 45 U/L (ref 0–45)
BACTERIA #/AREA URNS HPF: ABNORMAL /HPF
BASOPHILS # BLD AUTO: 0 10E3/UL (ref 0–0.2)
BASOPHILS NFR BLD AUTO: 1 %
BILIRUB SERPL-MCNC: 0.2 MG/DL
BILIRUB UR QL STRIP: NEGATIVE
BUN SERPL-MCNC: 7 MG/DL (ref 6–20)
CALCIUM SERPL-MCNC: 9.4 MG/DL (ref 8.8–10.4)
CAOX CRY #/AREA URNS HPF: ABNORMAL /HPF
CHLORIDE SERPL-SCNC: 99 MMOL/L (ref 98–107)
COLOR UR AUTO: YELLOW
CREAT SERPL-MCNC: 0.52 MG/DL (ref 0.51–0.95)
EGFRCR SERPLBLD CKD-EPI 2021: >90 ML/MIN/1.73M2
EOSINOPHIL # BLD AUTO: 0.1 10E3/UL (ref 0–0.7)
EOSINOPHIL NFR BLD AUTO: 2 %
ERYTHROCYTE [DISTWIDTH] IN BLOOD BY AUTOMATED COUNT: 12.9 % (ref 10–15)
GLUCOSE SERPL-MCNC: 287 MG/DL (ref 70–99)
GLUCOSE UR STRIP-MCNC: 100 MG/DL
HCO3 SERPL-SCNC: 22 MMOL/L (ref 22–29)
HCT VFR BLD AUTO: 40.4 % (ref 35–47)
HGB BLD-MCNC: 13.4 G/DL (ref 11.7–15.7)
HGB UR QL STRIP: NEGATIVE
HOLD SPECIMEN: NORMAL
HOLD SPECIMEN: NORMAL
HYALINE CASTS: 8 /LPF
IMM GRANULOCYTES # BLD: 0 10E3/UL
IMM GRANULOCYTES NFR BLD: 0 %
KETONES UR STRIP-MCNC: ABNORMAL MG/DL
LEUKOCYTE ESTERASE UR QL STRIP: NEGATIVE
LIPASE SERPL-CCNC: 15 U/L (ref 13–60)
LYMPHOCYTES # BLD AUTO: 3 10E3/UL (ref 0.8–5.3)
LYMPHOCYTES NFR BLD AUTO: 50 %
MCH RBC QN AUTO: 30 PG (ref 26.5–33)
MCHC RBC AUTO-ENTMCNC: 33.2 G/DL (ref 31.5–36.5)
MCV RBC AUTO: 90 FL (ref 78–100)
MONOCYTES # BLD AUTO: 0.4 10E3/UL (ref 0–1.3)
MONOCYTES NFR BLD AUTO: 6 %
MUCOUS THREADS #/AREA URNS LPF: PRESENT /LPF
NEUTROPHILS # BLD AUTO: 2.4 10E3/UL (ref 1.6–8.3)
NEUTROPHILS NFR BLD AUTO: 41 %
NITRATE UR QL: NEGATIVE
NRBC # BLD AUTO: 0 10E3/UL
NRBC BLD AUTO-RTO: 0 /100
PH UR STRIP: 5.5 [PH] (ref 5–7)
PLATELET # BLD AUTO: 361 10E3/UL (ref 150–450)
POTASSIUM SERPL-SCNC: 3.9 MMOL/L (ref 3.4–5.3)
PROT SERPL-MCNC: 7.1 G/DL (ref 6.4–8.3)
RBC # BLD AUTO: 4.47 10E6/UL (ref 3.8–5.2)
RBC URINE: <1 /HPF
SODIUM SERPL-SCNC: 137 MMOL/L (ref 135–145)
SP GR UR STRIP: 1.03 (ref 1–1.03)
SQUAMOUS EPITHELIAL: 2 /HPF
UROBILINOGEN UR STRIP-MCNC: 2 MG/DL
WBC # BLD AUTO: 6 10E3/UL (ref 4–11)
WBC URINE: 1 /HPF

## 2025-07-17 PROCEDURE — 81001 URINALYSIS AUTO W/SCOPE: CPT | Performed by: EMERGENCY MEDICINE

## 2025-07-17 PROCEDURE — 36415 COLL VENOUS BLD VENIPUNCTURE: CPT | Performed by: EMERGENCY MEDICINE

## 2025-07-17 PROCEDURE — 258N000003 HC RX IP 258 OP 636: Performed by: EMERGENCY MEDICINE

## 2025-07-17 PROCEDURE — 80053 COMPREHEN METABOLIC PANEL: CPT | Performed by: EMERGENCY MEDICINE

## 2025-07-17 PROCEDURE — 99283 EMERGENCY DEPT VISIT LOW MDM: CPT | Performed by: EMERGENCY MEDICINE

## 2025-07-17 PROCEDURE — 85025 COMPLETE CBC W/AUTO DIFF WBC: CPT | Performed by: EMERGENCY MEDICINE

## 2025-07-17 PROCEDURE — 250N000013 HC RX MED GY IP 250 OP 250 PS 637: Performed by: EMERGENCY MEDICINE

## 2025-07-17 PROCEDURE — 83690 ASSAY OF LIPASE: CPT | Performed by: EMERGENCY MEDICINE

## 2025-07-17 PROCEDURE — 84155 ASSAY OF PROTEIN SERUM: CPT | Performed by: EMERGENCY MEDICINE

## 2025-07-17 RX ORDER — ONDANSETRON 2 MG/ML
4 INJECTION INTRAMUSCULAR; INTRAVENOUS EVERY 30 MIN PRN
Status: DISCONTINUED | OUTPATIENT
Start: 2025-07-17 | End: 2025-07-18 | Stop reason: HOSPADM

## 2025-07-17 RX ORDER — DICYCLOMINE HCL 20 MG
20 TABLET ORAL ONCE
Status: COMPLETED | OUTPATIENT
Start: 2025-07-17 | End: 2025-07-17

## 2025-07-17 RX ORDER — MAGNESIUM HYDROXIDE/ALUMINUM HYDROXICE/SIMETHICONE 120; 1200; 1200 MG/30ML; MG/30ML; MG/30ML
30 SUSPENSION ORAL ONCE
Status: COMPLETED | OUTPATIENT
Start: 2025-07-17 | End: 2025-07-17

## 2025-07-17 RX ADMIN — ALUMINUM HYDROXIDE, MAGNESIUM HYDROXIDE, AND DIMETHICONE 30 ML: 200; 20; 200 SUSPENSION ORAL at 21:52

## 2025-07-17 RX ADMIN — DICYCLOMINE HYDROCHLORIDE 20 MG: 20 TABLET ORAL at 21:55

## 2025-07-17 RX ADMIN — SODIUM CHLORIDE 1000 ML: 0.9 INJECTION, SOLUTION INTRAVENOUS at 21:54

## 2025-07-17 ASSESSMENT — ACTIVITIES OF DAILY LIVING (ADL)
ADLS_ACUITY_SCORE: 41

## 2025-07-17 NOTE — ED TRIAGE NOTES
Pt complains of RLQ abd pain that started Tuesday afternoon. Pt complains of nausea and vomiting. Pt has hx of gallbladder removal.

## 2025-07-18 VITALS
HEIGHT: 60 IN | BODY MASS INDEX: 52.11 KG/M2 | SYSTOLIC BLOOD PRESSURE: 165 MMHG | WEIGHT: 265.43 LBS | DIASTOLIC BLOOD PRESSURE: 82 MMHG | RESPIRATION RATE: 18 BRPM | OXYGEN SATURATION: 99 % | HEART RATE: 55 BPM | TEMPERATURE: 98.4 F

## 2025-07-18 RX ORDER — DICYCLOMINE HCL 20 MG
20 TABLET ORAL 4 TIMES DAILY PRN
Qty: 30 TABLET | Refills: 0 | Status: SHIPPED | OUTPATIENT
Start: 2025-07-18

## 2025-07-18 ASSESSMENT — ACTIVITIES OF DAILY LIVING (ADL): ADLS_ACUITY_SCORE: 41

## 2025-07-18 NOTE — ED PROVIDER NOTES
Emergency Department Note      History of Present Illness     Chief Complaint   Abdominal Pain      HPI   Adriane Garrett is a 49 year old female who presents to the emergency department for abdominal pain.  Patient reports that she has had generalized abdominal pain since Tuesday.  Feels worsening pain in the right lower quadrant when she eats or drinks anything.  Has had some intermittent diarrhea but has not had a history of IBS.  Has had some nausea and vomiting.  No blood in the vomit or stools.  No fevers no cough, runny nose or other concerns.  Patient has tried Tylenol ibuprofen without significant improvement in pain.  She has had a history of a hysterectomy as well as cholecystectomy..      Independent Historian   None    Review of External Notes   Reviewed patient's note from 1/22/2025 when patient was evaluated in the ER for generalized abdominal pain.  Labs were reassuring.  Patient was given Zofran and fluids.    Reviewed patient's emergency care plan, chronic recurrent abdominal pain.    Past Medical History     Medical History and Problem List   Past Medical History:   Diagnosis Date    Asthma     Bipolar disorder     Gastroesophageal reflux disease     Hyperlipidemia     IBS (irritable bowel syndrome)     Migraine     Obesity        Medications   albuterol (PROAIR HFA/PROVENTIL HFA/VENTOLIN HFA) 108 (90 Base) MCG/ACT inhaler  albuterol (PROAIR HFA/PROVENTIL HFA/VENTOLIN HFA) 108 (90 Base) MCG/ACT inhaler  clindamycin (CLEOCIN) 150 MG capsule  diclofenac (VOLTAREN) 1 % topical gel  famotidine (PEPCID) 20 MG tablet  hyoscyamine (LEVSIN/SL) 0.125 MG sublingual tablet  loperamide (IMODIUM A-D) 2 MG tablet  methocarbamol (ROBAXIN) 500 MG tablet  methocarbamol (ROBAXIN) 750 MG tablet  prochlorperazine (COMPAZINE) 10 MG tablet  SUMAtriptan (IMITREX) 25 MG tablet        Surgical History   Past Surgical History:   Procedure Laterality Date    CHOLECYSTECTOMY      FOOT SURGERY Right     HYSTERECTOMY TOTAL  ABDOMINAL, BILATERAL SALPINGO-OOPHORECTOMY, COMBINED      multiple surgeries       Physical Exam     Patient Vitals for the past 24 hrs:   BP Temp Temp src Pulse Resp SpO2 Height Weight   07/17/25 1851 (!) 134/100 98.4  F (36.9  C) Oral 68 17 100 % -- --   07/17/25 1850 -- -- -- -- -- -- 1.524 m (5') 120.4 kg (265 lb 6.9 oz)     Physical Exam  General: Resting on the chair   Head: No obvious trauma to head.  Ears, Nose, Throat:  External ears normal.  Nose normal.   Eyes:  Conjunctivae clear.  Pupils are equal, round, and reactive.   Neck: Normal range of motion.  Neck supple.   CV: Regular rate and rhythm.  No murmurs.      Respiratory: Effort normal and breath sounds normal.  No wheezing or crackles.   Gastrointestinal: Soft.  No distension. There is mild RLQ and mild diffuse tenderness.  There is no rigidity, no rebound and no guarding.   Musculoskeletal: No CVA tenderness   Neuro: Alert. Moving all extremities appropriately.  Normal speech.    Skin: Skin is warm and dry.  No rash noted.     Diagnostics     Lab Results   Labs Ordered and Resulted from Time of ED Arrival to Time of ED Departure   ROUTINE UA WITH MICROSCOPIC REFLEX TO CULTURE - Abnormal       Result Value    Color Urine Yellow      Appearance Urine Clear      Glucose Urine 100 (*)     Bilirubin Urine Negative      Ketones Urine Trace (*)     Specific Gravity Urine 1.028      Blood Urine Negative      pH Urine 5.5      Protein Albumin Urine 10 (*)     Urobilinogen Urine 2.0 (*)     Nitrite Urine Negative      Leukocyte Esterase Urine Negative      Bacteria Urine Few (*)     Mucus Urine Present (*)     Calcium Oxalate Crystals Urine Many (*)     RBC Urine <1      WBC Urine 1      Squamous Epithelials Urine 2 (*)     Hyaline Casts Urine 8 (*)    COMPREHENSIVE METABOLIC PANEL - Abnormal    Sodium 137      Potassium 3.9      Carbon Dioxide (CO2) 22      Anion Gap 16 (*)     Urea Nitrogen 7.0      Creatinine 0.52      GFR Estimate >90      Calcium 9.4    "   Chloride 99      Glucose 287 (*)     Alkaline Phosphatase 105      AST 45      ALT 55 (*)     Protein Total 7.1      Albumin 4.2      Bilirubin Total 0.2     CBC WITH PLATELETS AND DIFFERENTIAL    WBC Count 6.0      RBC Count 4.47      Hemoglobin 13.4      Hematocrit 40.4      MCV 90      MCH 30.0      MCHC 33.2      RDW 12.9      Platelet Count 361      % Neutrophils 41      % Lymphocytes 50      % Monocytes 6      % Eosinophils 2      % Basophils 1      % Immature Granulocytes 0      NRBCs per 100 WBC 0      Absolute Neutrophils 2.4      Absolute Lymphocytes 3.0      Absolute Monocytes 0.4      Absolute Eosinophils 0.1      Absolute Basophils 0.0      Absolute Immature Granulocytes 0.0      Absolute NRBCs 0.0         Imaging   No orders to display       EKG   ECG taken at ***, ECG read at ***  ***   *** as compared to prior, dated ***/***/***.  Rate *** bpm. MS interval *** ms. QRS duration *** ms. QT/QTc ***/*** ms. P-R-T axes *** *** ***.    Independent Interpretation   {IndependentReview:739689::\"None\"}    ED Course      Medications Administered   Medications - No data to display    Procedures   Procedures     Discussion of Management   {Consults/Care Discussions:922445::\"None\"}    ED Course        Additional Documentation  {EPPAAdditionalPhrase:624438::\"None\"}    Medical Decision Making / Diagnosis     CMS Diagnoses: {Sepsis/Septic Shock/Stemi/Stroke:904192::\"None\"}    MIPS   {EPPA MIPS:326346::\"None\"}               CAMILA Garrett is a 49 year old female ***    Disposition   {EPPAFV Dispo:340856}    Diagnosis   No diagnosis found.     Discharge Medications   New Prescriptions    No medications on file         Natasha Archer MD    " dysfunction.  Mild hyperglycemia but no significant acidosis.  Does not appear consistent with DKA.  Reviewing recent labs patient does appear to be persistently hyperglycemic.  LFTs and lipase are reassuring, not concerning for acute obstructive biliary process, hepatitis or pancreatitis.  Urinalysis shows no obvious signs of infection.  Patient denies any dysuria or frequency.  Patient's exam is otherwise reassuring.  No evidence of acute surgical process.  She feels improved after above interventions.  Do not think that additional imaging is required but rather supportive outpatient management.  Recommendation to follow-up closely with primary care doctor.  Return to the if having worsening pain, fevers, intractable nausea/vomiting or other concerns.      Disposition   The patient was discharged.     Diagnosis     ICD-10-CM    1. Generalized abdominal pain  R10.84       2. Hyperglycemia  R73.9            Discharge Medications   Discharge Medication List as of 7/18/2025  1:32 AM        START taking these medications    Details   dicyclomine (BENTYL) 20 MG tablet Take 1 tablet (20 mg) by mouth 4 times daily as needed., Disp-30 tablet, R-0, E-Prescribe               MD Gianni Wharton Jennifer L, MD  07/18/25 3433

## 2025-08-14 ENCOUNTER — APPOINTMENT (OUTPATIENT)
Dept: GENERAL RADIOLOGY | Facility: CLINIC | Age: 50
End: 2025-08-14
Attending: EMERGENCY MEDICINE

## 2025-08-14 ENCOUNTER — HOSPITAL ENCOUNTER (EMERGENCY)
Facility: CLINIC | Age: 50
Discharge: HOME | End: 2025-08-14
Attending: EMERGENCY MEDICINE | Admitting: EMERGENCY MEDICINE

## 2025-08-14 VITALS
RESPIRATION RATE: 18 BRPM | OXYGEN SATURATION: 99 % | DIASTOLIC BLOOD PRESSURE: 94 MMHG | HEART RATE: 55 BPM | BODY MASS INDEX: 51.72 KG/M2 | WEIGHT: 263.45 LBS | TEMPERATURE: 97.9 F | HEIGHT: 60 IN | SYSTOLIC BLOOD PRESSURE: 153 MMHG

## 2025-08-14 DIAGNOSIS — M25.512 ACUTE PAIN OF LEFT SHOULDER: Primary | ICD-10-CM

## 2025-08-14 PROCEDURE — 99283 EMERGENCY DEPT VISIT LOW MDM: CPT | Performed by: EMERGENCY MEDICINE

## 2025-08-14 PROCEDURE — 73030 X-RAY EXAM OF SHOULDER: CPT | Mod: LT

## 2025-08-14 RX ORDER — CYCLOBENZAPRINE HCL 10 MG
10 TABLET ORAL 3 TIMES DAILY PRN
Qty: 21 TABLET | Refills: 0 | Status: SHIPPED | OUTPATIENT
Start: 2025-08-14 | End: 2025-08-21

## 2025-08-14 ASSESSMENT — ACTIVITIES OF DAILY LIVING (ADL): ADLS_ACUITY_SCORE: 41

## 2025-08-14 ASSESSMENT — COLUMBIA-SUICIDE SEVERITY RATING SCALE - C-SSRS
6. HAVE YOU EVER DONE ANYTHING, STARTED TO DO ANYTHING, OR PREPARED TO DO ANYTHING TO END YOUR LIFE?: NO
1. IN THE PAST MONTH, HAVE YOU WISHED YOU WERE DEAD OR WISHED YOU COULD GO TO SLEEP AND NOT WAKE UP?: NO
2. HAVE YOU ACTUALLY HAD ANY THOUGHTS OF KILLING YOURSELF IN THE PAST MONTH?: NO

## (undated) RX ORDER — LIDOCAINE 4 G/G
PATCH TOPICAL
Status: DISPENSED
Start: 2023-10-05